# Patient Record
Sex: FEMALE | Race: WHITE | NOT HISPANIC OR LATINO | Employment: FULL TIME | URBAN - NONMETROPOLITAN AREA
[De-identification: names, ages, dates, MRNs, and addresses within clinical notes are randomized per-mention and may not be internally consistent; named-entity substitution may affect disease eponyms.]

---

## 2017-03-14 DIAGNOSIS — I10 ESSENTIAL HYPERTENSION: ICD-10-CM

## 2017-03-14 RX ORDER — METOPROLOL SUCCINATE 100 MG/1
100 TABLET, EXTENDED RELEASE ORAL DAILY
Qty: 90 TABLET | Refills: 2 | Status: SHIPPED
Start: 2017-03-14 | End: 2017-11-11

## 2017-03-21 ENCOUNTER — OFFICE VISIT (OUTPATIENT)
Dept: FAMILY MEDICINE | Facility: OTHER | Age: 47
End: 2017-03-21
Attending: FAMILY MEDICINE
Payer: COMMERCIAL

## 2017-03-21 VITALS
HEART RATE: 86 BPM | OXYGEN SATURATION: 98 % | SYSTOLIC BLOOD PRESSURE: 128 MMHG | BODY MASS INDEX: 39.68 KG/M2 | WEIGHT: 210 LBS | DIASTOLIC BLOOD PRESSURE: 80 MMHG | RESPIRATION RATE: 20 BRPM

## 2017-03-21 DIAGNOSIS — L03.211 CELLULITIS OF FACE: Primary | ICD-10-CM

## 2017-03-21 PROCEDURE — 99213 OFFICE O/P EST LOW 20 MIN: CPT | Performed by: FAMILY MEDICINE

## 2017-03-21 PROCEDURE — 99212 OFFICE O/P EST SF 10 MIN: CPT

## 2017-03-21 RX ORDER — CEFPROZIL 500 MG/1
500 TABLET, FILM COATED ORAL 2 TIMES DAILY
Qty: 20 TABLET | Refills: 0 | Status: SHIPPED
Start: 2017-03-21 | End: 2017-08-14

## 2017-03-21 ASSESSMENT — PAIN SCALES - GENERAL: PAINLEVEL: MODERATE PAIN (4)

## 2017-03-21 NOTE — NURSING NOTE
"Chief Complaint   Patient presents with     Nose Problem     4 days nose pain, more so on the left side of her nose. Pain has been constant throbbing, today pain has diminished. Did end up hitting her nose on the side of a door.        Initial /80  Pulse 86  Resp 20  Wt 210 lb (95.3 kg)  SpO2 98%  BMI 39.68 kg/m2 Estimated body mass index is 39.68 kg/(m^2) as calculated from the following:    Height as of 12/9/16: 5' 1\" (1.549 m).    Weight as of this encounter: 210 lb (95.3 kg).  Medication Reconciliation: complete   Kelly Jaeger      "

## 2017-03-21 NOTE — PROGRESS NOTES
SUBJECTIVE:  Edith Doty, 46 year old, female is seen with left nasal swelling, pain and drainage.  Present over the last several days.    Denies fever, shaking, chills, nausea, vomiting, or diarrhea.  No household contacts are ill.      Current Outpatient Prescriptions   Medication Sig Dispense Refill     metoprolol (TOPROL-XL) 100 MG 24 hr tablet Take 1 tablet (100 mg) by mouth daily 90 tablet 2     amLODIPine (NORVASC) 10 MG tablet Take 1 tablet (10 mg) by mouth daily 90 tablet 1     albuterol (2.5 MG/3ML) 0.083% nebulizer solution USE 1 VIAL BY NEBULIZATION EVERY 6 HOURS AS NEEDED FOR SHORTNESS OF BREATH / DYSPNEA OR WHEEZING 90 mL 3     fluticasone (FLONASE) 50 MCG/ACT nasal spray SPRAY 2 SPRAYS INTO BOTH NOSTRILS DAILY 16 g 11     albuterol (ALBUTEROL) 108 (90 BASE) MCG/ACT inhaler Inhale 2 puffs into the lungs every 6 hours 3 Inhaler 1     levothyroxine (SYNTHROID) 200 MCG tablet Take 200 mcg by mouth daily          Allergies   Allergen Reactions     Amoxicillin Trihydrate Itching     Augmentin       Clavulanic Acid Potassium Itching     Augmentin       Levaquin [Levofloxacin] Swelling     Penicillins Hives       Past Medical History   Diagnosis Date     Allergic Rhinitis, Seasonal 04/25/2011     Asthma 01/01/2011     Hypertension, Benign 04/25/2011     Hypothyroidism 01/01/2011     Nontoxic uninodular goiter 11/28/2007     Prediabetes 04/25/2011     Past Surgical History   Procedure Laterality Date     Cholecystectomy  1996     Eye surgery for strabismus at ages 2 & 4       Family History   Problem Relation Age of Onset     DIABETES Father      Other - See Comments Sister      endometriosis     Allergies Sister      environmental     Other - See Comments Mother      fibromyalgia and endometreosis     HEART DISEASE Mother      murmer     Allergies Mother      environmental     Hypertension Brother      Allergies Brother      environmental     Asthma No family hx of      Thyroid Disease No family hx of       Social History     Social History     Marital status:      Spouse name: N/A     Number of children: N/A     Years of education: N/A     Occupational History     [employer]      full-time     Social History Main Topics     Smoking status: Never Smoker     Smokeless tobacco: Never Used      Comment: no passive exposure     Alcohol use No     Drug use: No     Sexual activity: Yes     Partners: Male     Other Topics Concern      Service No     Blood Transfusions Yes     Permits if needed     Caffeine Concern No     Seat Belt Yes     Self-Exams Yes     Parent/Sibling W/ Cabg, Mi Or Angioplasty Before 65f 55m? No     Social History Narrative         Review Of Systems  Constitutional, HEENT, cardiovascular, pulmonary, gi and gu systems are negative, except as otherwise noted.    OBJECTIVE:  Vitals: B/P: 128/80, T: Data Unavailable, P: 86, R: 20    Exam:  Physical Exam   Constitutional: She is oriented to person, place, and time. She appears well-developed and well-nourished. No distress.   HENT:   Head: Normocephalic.   Right Ear: Hearing, tympanic membrane, external ear and ear canal normal.   Left Ear: Hearing, tympanic membrane, external ear and ear canal normal.   Nose: Nose normal. Right sinus exhibits no maxillary sinus tenderness and no frontal sinus tenderness. Left sinus exhibits no maxillary sinus tenderness and no frontal sinus tenderness.   Mouth/Throat: Uvula is midline and oropharynx is clear and moist. No oropharyngeal exudate or posterior oropharyngeal erythema.   There is edema and erythema noted left nasal ala   Eyes: Conjunctivae are normal.   Neck: Neck supple.   Pulmonary/Chest: Effort normal and breath sounds normal.   Lymphadenopathy:     She has no cervical adenopathy.   Neurological: She is alert and oriented to person, place, and time.   Skin: Skin is warm and dry.   Psychiatric: She has a normal mood and affect.     Other exam not repeated    Labs:  Office Visit on  09/26/2014   Component Date Value Ref Range Status     Specimen Description 09/26/2014 Throat   Final     Rapid Strep A Screen 09/26/2014    Final                    Value:NEGATIVE: No Group A streptococcal antigen detected by immunoassay, await   culture report.       Micro Report Status 09/26/2014 FINAL 09/26/2014   Final     Specimen Description 09/26/2014 Throat   Final     Culture Micro 09/26/2014 No beta hemolytic Streptococcus Group A isolated   Final     Micro Report Status 09/26/2014 FINAL 09/28/2014   Final       ASSESSMENT/PLAN:  Cellulitis, nose  Cefprozil as written.  Warm packs.  Follow up with persistent symptoms  - cefPROZIL (CEFZIL) 500 MG tablet; Take 1 tablet (500 mg) by mouth 2 times daily            Gonzalo Pino MD

## 2017-03-21 NOTE — MR AVS SNAPSHOT
"              After Visit Summary   3/21/2017    Edith Doty    MRN: [id number]           Patient Information     Date Of Birth          1970        Visit Information        Provider Department      3/21/2017 2:40 PM [facility]        Today's Diagnoses     Cellulitis, nose    -  1      Care Instructions    Take cefzil twice daily        Follow-ups after your visit        Follow-up notes from your care team     Return if symptoms worsen or fail to improve.      Who to contact     If you have questions or need follow up information about today's clinic visit or your schedule please contact [facility] directly at [phone].  Normal or non-critical lab and imaging results will be communicated to you by MyChart, letter or phone within 4 business days after the clinic has received the results. If you do not hear from us within 7 days, please contact the clinic through MyChart or phone. If you have a critical or abnormal lab result, we will notify you by phone as soon as possible.  Submit refill requests through [employer] or call your pharmacy and they will forward the refill request to us. Please allow 3 business days for your refill to be completed.          Additional Information About Your Visit        MyChart Information     [employer] lets you send messages to your doctor, view your test results, renew your prescriptions, schedule appointments and more. To sign up, go to [email or web address] . Click on \"Log in\" on the left side of the screen, which will take you to the Welcome page. Then click on \"Sign up Now\" on the right side of the page.     You will be asked to enter the access code listed below, as well as some personal information. Please follow the directions to create your username and password.     Your access code is: [email or web address]  Expires: 2017  7:36 PM     Your access code will  in 90 days. If you need help or a new code, please call " your [facility] clinic or [phone].        Care EveryWhere ID     This is your Care EveryWhere ID. This could be used by other organizations to access your [facility] medical records  [id number]        Your Vitals Were     Pulse Respirations Pulse Oximetry BMI (Body Mass Index)          86 20 98% 39.68 kg/m2         Blood Pressure from Last 3 Encounters:   03/21/17 128/80   12/09/16 136/85   11/16/16 (!) 134/92    Weight from Last 3 Encounters:   03/21/17 210 lb (95.3 kg)   12/09/16 218 lb (98.9 kg)   11/16/16 217 lb (98.4 kg)              Today, you had the following     No orders found for display         Today's Medication Changes          These changes are accurate as of: 3/21/17 11:59 PM.  If you have any questions, ask your nurse or doctor.               Start taking these medicines.        Dose/Directions    cefPROZIL 500 MG tablet   Commonly known as:  CEFZIL   Used for:  Cellulitis of face   Started by:  Gonzalo Pino MD        Dose:  500 mg   Take 1 tablet (500 mg) by mouth 2 times daily   Quantity:  20 tablet   Refills:  0            Where to get your medicines      These medications were sent to [address] [address]     Phone:  [phone]     cefPROZIL 500 MG tablet                Primary Care Provider Office Phone # Fax #    Gonzalo Pino [phone]       [address] [address]        Thank you!     Thank you for choosing [facility]  for your care. Our goal is always to provide you with excellent care. Hearing back from our patients is one way we can continue to improve our services. Please take a few minutes to complete the written survey that you may receive in the mail after your visit with us. Thank you!             Your Updated Medication List - Protect others around you: Learn how to safely use, store and throw away your medicines at www.disposemymeds.org.          This  list is accurate as of: 3/21/17 11:59 PM.  Always use your most recent med list.                   Brand Name Dispense Instructions for use    * albuterol 108 (90 BASE) MCG/ACT Inhaler    albuterol    3 Inhaler    Inhale 2 puffs into the lungs every 6 hours       * albuterol (2.5 MG/3ML) 0.083% neb solution     90 mL    USE 1 VIAL BY NEBULIZATION EVERY 6 HOURS AS NEEDED FOR SHORTNESS OF BREATH / DYSPNEA OR WHEEZING       amLODIPine 10 MG tablet    NORVASC    90 tablet    Take 1 tablet (10 mg) by mouth daily       cefPROZIL 500 MG tablet    CEFZIL    20 tablet    Take 1 tablet (500 mg) by mouth 2 times daily       fluticasone 50 MCG/ACT spray    FLONASE    16 g    SPRAY 2 SPRAYS INTO BOTH NOSTRILS DAILY       metoprolol 100 MG 24 hr tablet    TOPROL-XL    90 tablet    Take 1 tablet (100 mg) by mouth daily       SYNTHROID 200 MCG tablet   Generic drug:  levothyroxine      Take 200 mcg by mouth daily       * Notice:  This list has 2 medication(s) that are the same as other medications prescribed for you. Read the directions carefully, and ask your doctor or other care provider to review them with you.

## 2017-07-03 DIAGNOSIS — I10 ESSENTIAL HYPERTENSION: ICD-10-CM

## 2017-07-05 RX ORDER — AMLODIPINE BESYLATE 10 MG/1
TABLET ORAL
Qty: 90 TABLET | Refills: 2 | Status: SHIPPED | OUTPATIENT
Start: 2017-07-05 | End: 2018-03-05

## 2017-08-04 ENCOUNTER — TRANSFERRED RECORDS (OUTPATIENT)
Dept: HEALTH INFORMATION MANAGEMENT | Facility: HOSPITAL | Age: 47
End: 2017-08-04

## 2017-08-14 ENCOUNTER — OFFICE VISIT (OUTPATIENT)
Dept: FAMILY MEDICINE | Facility: OTHER | Age: 47
End: 2017-08-14
Attending: FAMILY MEDICINE
Payer: COMMERCIAL

## 2017-08-14 VITALS
HEART RATE: 78 BPM | OXYGEN SATURATION: 96 % | BODY MASS INDEX: 39.46 KG/M2 | SYSTOLIC BLOOD PRESSURE: 128 MMHG | WEIGHT: 209 LBS | DIASTOLIC BLOOD PRESSURE: 90 MMHG | TEMPERATURE: 97.3 F | HEIGHT: 61 IN

## 2017-08-14 DIAGNOSIS — I10 ESSENTIAL HYPERTENSION: Primary | ICD-10-CM

## 2017-08-14 PROCEDURE — 99213 OFFICE O/P EST LOW 20 MIN: CPT | Performed by: FAMILY MEDICINE

## 2017-08-14 PROCEDURE — 99212 OFFICE O/P EST SF 10 MIN: CPT

## 2017-08-14 ASSESSMENT — PATIENT HEALTH QUESTIONNAIRE - PHQ9
5. POOR APPETITE OR OVEREATING: NOT AT ALL
SUM OF ALL RESPONSES TO PHQ QUESTIONS 1-9: 0

## 2017-08-14 ASSESSMENT — ANXIETY QUESTIONNAIRES
3. WORRYING TOO MUCH ABOUT DIFFERENT THINGS: NOT AT ALL
5. BEING SO RESTLESS THAT IT IS HARD TO SIT STILL: NOT AT ALL
1. FEELING NERVOUS, ANXIOUS, OR ON EDGE: SEVERAL DAYS
7. FEELING AFRAID AS IF SOMETHING AWFUL MIGHT HAPPEN: NOT AT ALL
GAD7 TOTAL SCORE: 1
6. BECOMING EASILY ANNOYED OR IRRITABLE: NOT AT ALL
2. NOT BEING ABLE TO STOP OR CONTROL WORRYING: NOT AT ALL

## 2017-08-14 ASSESSMENT — PAIN SCALES - GENERAL: PAINLEVEL: NO PAIN (0)

## 2017-08-14 NOTE — PATIENT INSTRUCTIONS
My Asthma Action Plan  Name: Edith Doty   YOB: 1970  Date: 8/14/2017   My doctor: Gonzalo Pino MD   My clinic: [facility] [place]        My Control Medicine: none  My Rescue Medicine: Albuterol (Proair/Ventolin/Proventil) inhaler inhale 2 puffs into the lungs every 6 hours  My Oral Steroid Medicine: prednisone  My Asthma Severity: intermittent  Avoid your asthma triggers: smoke, pollens, humidity and strong odors and fumes               GREEN ZONE   Good Control    I feel good    No cough or wheeze    Can work, sleep and play without asthma symptoms       Take your asthma control medicine every day.     1. If exercise triggers your asthma, take your rescue medication    15 minutes before exercise or sports, and    During exercise if you have asthma symptoms  2. Spacer to use with inhaler: If you have a spacer, make sure to use it with your inhaler             YELLOW ZONE Getting Worse  I have ANY of these:    I do not feel good    Cough or wheeze    Chest feels tight    Wake up at night   1. Keep taking your Green Zone medications  2. Start taking your rescue medicine:    every 20 minutes for up to 1 hour. Then every 4 hours for 24-48 hours.  3. If you stay in the Yellow Zone for more than 12-24 hours, contact your doctor.  4. If you do not return to the Green Zone in 12-24 hours or you get worse, start taking your oral steroid medicine if prescribed by your provider.           RED ZONE Medical Alert - Get Help  I have ANY of these:    I feel awful    Medicine is not helping    Breathing getting harder    Trouble walking or talking    Nose opens wide to breathe       1. Take your rescue medicine NOW  2. If your provider has prescribed an oral steroid medicine, start taking it NOW  3. Call your doctor NOW  4. If you are still in the Red Zone after 20 minutes and you have not reached your doctor:    Take your rescue medicine again and    Call 911 or go to the emergency room right  away    See your regular doctor within 2 weeks of an Emergency Room or Urgent Care visit for follow-up treatment.        Electronically signed by: Lilian Pool, August 14, 2017    Annual Reminders:  Meet with Asthma Educator,  Flu Shot in the Fall, consider Pneumonia Vaccination for patients with asthma (aged 19 and older).    Pharmacy:    [id number] [place], [place] - [address]   [facility] - [place], [place] - [address]                    Asthma Triggers  How To Control Things That Make Your Asthma Worse    Triggers are things that make your asthma worse.  Look at the list below to help you find your triggers and what you can do about them.  You can help prevent asthma flare-ups by staying away from your triggers.      Trigger                                                          What you can do   Cigarette Smoke  Tobacco smoke can make asthma worse. Do not allow smoking in your home, car or around you.  Be sure no one smokes at a child s day care or school.  If you smoke, ask your health care provider for ways to help you quit.  Ask family members to quit too.  Ask your health care provider for a referral to Quit Plan to help you quit smoking, or call 8-142-453-PLAN.     Colds, Flu, Bronchitis  These are common triggers of asthma. Wash your hands often.  Don t touch your eyes, nose or mouth.  Get a flu shot every year.     Dust Mites  These are tiny bugs that live in cloth or carpet. They are too small to see. Wash sheets and blankets in hot water every week.   Encase pillows and mattress in dust mite proof covers.  Avoid having carpet if you can. If you have carpet, vacuum weekly.   Use a dust mask and HEPA vacuum.   Pollen and Outdoor Mold  Some people are allergic to trees, grass, or weed pollen, or molds. Try to keep your windows closed.  Limit time out doors when pollen count is high.   Ask you health care provider about taking  medicine during allergy season.     Animal Dander  Some people are allergic to skin flakes, urine or saliva from pets with fur or feathers. Keep pets with fur or feathers out of your home.    If you can t keep the pet outdoors, then keep the pet out of your bedroom.  Keep the bedroom door closed.  Keep pets off cloth furniture and away from stuffed toys.     Mice, Rats, and Cockroaches  Some people are allergic to the waste from these pests.   Cover food and garbage.  Clean up spills and food crumbs.  Store grease in the refrigerator.   Keep food out of the bedroom.   Indoor Mold  This can be a trigger if your home has high moisture. Fix leaking faucets, pipes, or other sources of water.   Clean moldy surfaces.  Dehumidify basement if it is damp and smelly.   Smoke, Strong Odors, and Sprays  These can reduce air quality. Stay away from strong odors and sprays, such as perfume, powder, hair spray, paints, smoke incense, paint, cleaning products, candles and new carpet.   Exercise or Sports  Some people with asthma have this trigger. Be active!  Ask your doctor about taking medicine before sports or exercise to prevent symptoms.    Warm up for 5-10 minutes before and after sports or exercise.     Other Triggers of Asthma  Cold air:  Cover your nose and mouth with a scarf.  Sometimes laughing or crying can be a trigger.  Some medicines and food can trigger asthma.

## 2017-08-14 NOTE — PROGRESS NOTES
"    SUBJECTIVE:                                                    Edith Doty is a 46 year old female who presents to clinic today for the following health issues:  {Provider please address medication reconciliation discrepancies--rooming staff please delete if no med/rec issues}    {Superlists:706611}    {additional problems for provider to add:993093}    Problem list and histories reviewed & adjusted, as indicated.  Additional history: {NONE - AS DOCUMENTED:964963::\"as documented\"}    {HIST REVIEW/ LINKS 2:693186}    {PROVIDER CHARTING PREFERENCE:823378}      "

## 2017-08-14 NOTE — NURSING NOTE
"Chief Complaint   Patient presents with     Hypertension     checking blood pressures at home- no salt added, not excersing daily, regular diet        Initial /90 (BP Location: Right arm, Patient Position: Supine, Cuff Size: Adult Large)  Pulse 78  Temp 97.3  F (36.3  C) (Tympanic)  Ht 5' 1\" (1.549 m)  Wt 209 lb (94.8 kg)  SpO2 96%  BMI 39.49 kg/m2 Estimated body mass index is 39.49 kg/(m^2) as calculated from the following:    Height as of this encounter: 5' 1\" (1.549 m).    Weight as of this encounter: 209 lb (94.8 kg).  Medication Reconciliation: complete   Lilian Pool LPN      "

## 2017-08-14 NOTE — MR AVS SNAPSHOT
After Visit Summary   8/14/2017    Edith Doty    MRN: [id number]           Patient Information     Date Of Birth          1970        Visit Information        Provider Department      8/14/2017 10:20 AM Gonzalo Pino MD [facility]        Today's Diagnoses     Essential hypertension    -  1      Care Instructions      My Asthma Action Plan  Name: Edith Doty   YOB: 1970  Date: 8/14/2017   My doctor: Gonzalo Pino MD   My clinic: [facility] [place]        My Control Medicine: none  My Rescue Medicine: Albuterol (Proair/Ventolin/Proventil) inhaler inhale 2 puffs into the lungs every 6 hours  My Oral Steroid Medicine: prednisone  My Asthma Severity: intermittent  Avoid your asthma triggers: smoke, pollens, humidity and strong odors and fumes               GREEN ZONE   Good Control    I feel good    No cough or wheeze    Can work, sleep and play without asthma symptoms       Take your asthma control medicine every day.     1. If exercise triggers your asthma, take your rescue medication    15 minutes before exercise or sports, and    During exercise if you have asthma symptoms  2. Spacer to use with inhaler: If you have a spacer, make sure to use it with your inhaler             YELLOW ZONE Getting Worse  I have ANY of these:    I do not feel good    Cough or wheeze    Chest feels tight    Wake up at night   1. Keep taking your Green Zone medications  2. Start taking your rescue medicine:    every 20 minutes for up to 1 hour. Then every 4 hours for 24-48 hours.  3. If you stay in the Yellow Zone for more than 12-24 hours, contact your doctor.  4. If you do not return to the Green Zone in 12-24 hours or you get worse, start taking your oral steroid medicine if prescribed by your provider.           RED ZONE Medical Alert - Get Help  I have ANY of these:    I feel awful    Medicine is not helping    Breathing getting harder    Trouble walking or  talking    Nose opens wide to breathe       1. Take your rescue medicine NOW  2. If your provider has prescribed an oral steroid medicine, start taking it NOW  3. Call your doctor NOW  4. If you are still in the Red Zone after 20 minutes and you have not reached your doctor:    Take your rescue medicine again and    Call 911 or go to the emergency room right away    See your regular doctor within 2 weeks of an Emergency Room or Urgent Care visit for follow-up treatment.        Electronically signed by: Lilian Pool, August 14, 2017    Annual Reminders:  Meet with Asthma Educator,  Flu Shot in the Fall, consider Pneumonia Vaccination for patients with asthma (aged 19 and older).    Pharmacy:    [facility] - [place], [place] - [address]   [facility] - [place], [place] - [address]                    Asthma Triggers  How To Control Things That Make Your Asthma Worse    Triggers are things that make your asthma worse.  Look at the list below to help you find your triggers and what you can do about them.  You can help prevent asthma flare-ups by staying away from your triggers.      Trigger                                                          What you can do   Cigarette Smoke  Tobacco smoke can make asthma worse. Do not allow smoking in your home, car or around you.  Be sure no one smokes at a child s day care or school.  If you smoke, ask your health care provider for ways to help you quit.  Ask family members to quit too.  Ask your health care provider for a referral to Quit Plan to help you quit smoking, or call 5-970-240-PLAN.     Colds, Flu, Bronchitis  These are common triggers of asthma. Wash your hands often.  Don t touch your eyes, nose or mouth.  Get a flu shot every year.     Dust Mites  These are tiny bugs that live in cloth or carpet. They are too small to see. Wash sheets and blankets in hot water every week.   Encase pillows and  mattress in dust mite proof covers.  Avoid having carpet if you can. If you have carpet, vacuum weekly.   Use a dust mask and HEPA vacuum.   Pollen and Outdoor Mold  Some people are allergic to trees, grass, or weed pollen, or molds. Try to keep your windows closed.  Limit time out doors when pollen count is high.   Ask you health care provider about taking medicine during allergy season.     Animal Dander  Some people are allergic to skin flakes, urine or saliva from pets with fur or feathers. Keep pets with fur or feathers out of your home.    If you can t keep the pet outdoors, then keep the pet out of your bedroom.  Keep the bedroom door closed.  Keep pets off cloth furniture and away from stuffed toys.     Mice, Rats, and Cockroaches  Some people are allergic to the waste from these pests.   Cover food and garbage.  Clean up spills and food crumbs.  Store grease in the refrigerator.   Keep food out of the bedroom.   Indoor Mold  This can be a trigger if your home has high moisture. Fix leaking faucets, pipes, or other sources of water.   Clean moldy surfaces.  Dehumidify basement if it is damp and smelly.   Smoke, Strong Odors, and Sprays  These can reduce air quality. Stay away from strong odors and sprays, such as perfume, powder, hair spray, paints, smoke incense, paint, cleaning products, candles and new carpet.   Exercise or Sports  Some people with asthma have this trigger. Be active!  Ask your doctor about taking medicine before sports or exercise to prevent symptoms.    Warm up for 5-10 minutes before and after sports or exercise.     Other Triggers of Asthma  Cold air:  Cover your nose and mouth with a scarf.  Sometimes laughing or crying can be a trigger.  Some medicines and food can trigger asthma.             Follow-ups after your visit        Follow-up notes from your care team     Return in about 6 months (around 2/14/2018) for Follow Up Visit.      Who to contact     If you have questions or need  "follow up information about today's clinic visit or your schedule please contact [facility] directly at [phone].  Normal or non-critical lab and imaging results will be communicated to you by MyChart, letter or phone within 4 business days after the clinic has received the results. If you do not hear from us within 7 days, please contact the clinic through RadioSc[employer] or phone. If you have a critical or abnormal lab result, we will notify you by phone as soon as possible.  Submit refill requests through Travel Notes or call your pharmacy and they will forward the refill request to us. Please allow 3 business days for your refill to be completed.          Additional Information About Your Visit        RadioScapehart Information     Travel Notes lets you send messages to your doctor, view your test results, renew your prescriptions, schedule appointments and more. To sign up, go to [email or web address] . Click on \"Log in\" on the left side of the screen, which will take you to the Welcome page. Then click on \"Sign up Now\" on the right side of the page.     You will be asked to enter the access code listed below, as well as some personal information. Please follow the directions to create your username and password.     Your access code is: [email or web address]  Expires: 2017  8:49 AM     Your access code will  in 90 days. If you need help or a new code, please call your [place] clinic or [phone].        Care EveryWhere ID     This is your Care EveryWhere ID. This could be used by other organizations to access your [place] medical records  [id number]        Your Vitals Were     Pulse Temperature Height Pulse Oximetry BMI (Body Mass Index)       78 97.3  F (36.3  C) (Tympanic) 5' 1\" (1.549 m) 96% 39.49 kg/m2        Blood Pressure from Last 3 Encounters:   17 128/90   17 128/80   16 136/85    Weight from Last 3 Encounters:   17 209 lb (94.8 kg)   17 210 lb (95.3 kg)   16 " 218 lb (98.9 kg)               Primary Care Provider Office Phone # Fax #    Gonzalo Pino [phone]       [facility] [facility] [address]        Equal Access to Services     SARINA PEREYRA : Hadii christiano ku hadanupamao Soomaali, waaxda luqadaha, qaybta kaalmada adeegyada, tegan lorin elizabethn lluviapalak hotl mery emery. So [place] [phone].    ATENCIÓN: Si habla español, tiene a de la cruz disposición servicios gratuitos de asistencia lingüística. Llame al [phone].    We comply with applicable federal civil rights laws and [place] laws. We do not discriminate on the basis of race, color, national origin, age, disability sex, sexual orientation or gender identity.            Thank you!     Thank you for choosing [facility]  for your care. Our goal is always to provide you with excellent care. Hearing back from our patients is one way we can continue to improve our services. Please take a few minutes to complete the written survey that you may receive in the mail after your visit with us. Thank you!             Your Updated Medication List - Protect others around you: Learn how to safely use, store and throw away your medicines at www.disposemymeds.org.          This list is accurate as of: 8/14/17 11:59 PM.  Always use your most recent med list.                   Brand Name Dispense Instructions for use Diagnosis    * albuterol 108 (90 BASE) MCG/ACT Inhaler    albuterol    3 Inhaler    Inhale 2 puffs into the lungs every 6 hours    Asthma       * albuterol (2.5 MG/3ML) 0.083% neb solution     90 mL    USE 1 VIAL BY NEBULIZATION EVERY 6 HOURS AS NEEDED FOR SHORTNESS OF BREATH / DYSPNEA OR WHEEZING    Asthma exacerbation       amLODIPine 10 MG tablet    NORVASC    90 tablet    TAKE 1 TABLET BY MOUTH EVERY DAY    Essential hypertension       fluticasone 50 MCG/ACT spray    FLONASE    16 g    SPRAY 2 SPRAYS INTO BOTH NOSTRILS DAILY    Allergic rhinitis due to pollen        metoprolol 100 MG 24 hr tablet    TOPROL-XL    90 tablet    Take 1 tablet (100 mg) by mouth daily    Essential hypertension       SYNTHROID 200 MCG tablet   Generic drug:  levothyroxine      Take 200 mcg by mouth daily        * Notice:  This list has 2 medication(s) that are the same as other medications prescribed for you. Read the directions carefully, and ask your doctor or other care provider to review them with you.

## 2017-08-14 NOTE — PROGRESS NOTES
SUBJECTIVE:  Edith Doty, 46 year old, female is seen in follow up of HTN.   Her BP is controlled with the current regimen without complications or sequelae.  She was, however, note to have an isolated elevation at another provider. Edith was advised to follow up.    Denies chest pain, dyspnea, decreased exercise tolerance, dizzyness, nausea, vomiting, diaphoresis, palpitations, numbness, tingling, or paresthesias.      Current Outpatient Prescriptions   Medication Sig Dispense Refill     amLODIPine (NORVASC) 10 MG tablet TAKE 1 TABLET BY MOUTH EVERY DAY 90 tablet 2     metoprolol (TOPROL-XL) 100 MG 24 hr tablet Take 1 tablet (100 mg) by mouth daily 90 tablet 2     albuterol (2.5 MG/3ML) 0.083% nebulizer solution USE 1 VIAL BY NEBULIZATION EVERY 6 HOURS AS NEEDED FOR SHORTNESS OF BREATH / DYSPNEA OR WHEEZING 90 mL 3     fluticasone (FLONASE) 50 MCG/ACT nasal spray SPRAY 2 SPRAYS INTO BOTH NOSTRILS DAILY 16 g 11     albuterol (ALBUTEROL) 108 (90 BASE) MCG/ACT inhaler Inhale 2 puffs into the lungs every 6 hours 3 Inhaler 1     levothyroxine (SYNTHROID) 200 MCG tablet Take 200 mcg by mouth daily          Allergies   Allergen Reactions     Amoxicillin Trihydrate Itching     Augmentin       Clavulanic Acid Potassium Itching     Augmentin       Levaquin [Levofloxacin] Swelling     Penicillins Hives       Past Medical History:   Diagnosis Date     Allergic Rhinitis, Seasonal 04/25/2011     Asthma 01/01/2011     Hypertension, Benign 04/25/2011     Hypothyroidism 01/01/2011     Nontoxic uninodular goiter 11/28/2007     Prediabetes 04/25/2011     Past Surgical History:   Procedure Laterality Date     CHOLECYSTECTOMY  1996     eye surgery for strabismus at ages 2 & 4       Family History   Problem Relation Age of Onset     DIABETES Father      Other - See Comments Sister      endometriosis     Allergies Sister      environmental     Other - See Comments Mother      fibromyalgia and endometreosis     HEART DISEASE Mother       murmer     Allergies Mother      environmental     Hypertension Brother      Allergies Brother      environmental     Asthma No family hx of      Thyroid Disease No family hx of      Social History     Social History     Marital status:      Spouse name: N/A     Number of children: N/A     Years of education: N/A     Occupational History     [employer] tuter      full-time     Social History Main Topics     Smoking status: Never Smoker     Smokeless tobacco: Never Used      Comment: no passive exposure     Alcohol use 0.0 oz/week     0 Standard drinks or equivalent per week      Comment: socially      Drug use: No     Sexual activity: Yes     Partners: Male     Other Topics Concern      Service No     Blood Transfusions Yes     Permits if needed     Caffeine Concern No     Seat Belt Yes     Self-Exams Yes     Parent/Sibling W/ Cabg, Mi Or Angioplasty Before 65f 55m? No     Social History Narrative         Review Of Systems  Constitutional, HEENT, cardiovascular, pulmonary, gi and gu systems are negative, except as otherwise noted.    OBJECTIVE:  Vitals: B/P: 132/88, T: 97.5, P: 80, R: 16    Exam:  Physical Exam   Constitutional: She is oriented to person, place, and time. She appears well-developed and well-nourished. No distress.   Neurological: She is alert and oriented to person, place, and time.   Psychiatric: She has a normal mood and affect.   Other exam not repeated      Labs:  Office Visit on 09/26/2014   Component Date Value Ref Range Status     Specimen Description 09/26/2014 Throat   Final     Rapid Strep A Screen 09/26/2014    Final                    Value:NEGATIVE: No Group A streptococcal antigen detected by immunoassay, await   culture report.       Micro Report Status 09/26/2014 FINAL 09/26/2014   Final     Specimen Description 09/26/2014 Throat   Final     Culture Micro 09/26/2014 No beta hemolytic Streptococcus Group A isolated   Final     Micro Report Status 09/26/2014 FINAL  09/28/2014   Final       ASSESSMENT/PLAN:  Essential hypertension  Reviewed.  Continue same medications as outlined.  Fasting labs to be drawn.  Follow up 6 months.  - Asthma Action Plan; Future  - Lipid Profile; Future  - Comprehensive metabolic panel; Future  - CBC with platelets differential; Future  - UA reflex to Microscopic and Culture; Future                  Gonzalo Pino MD

## 2017-08-15 DIAGNOSIS — I10 ESSENTIAL HYPERTENSION: ICD-10-CM

## 2017-08-15 LAB
ALBUMIN SERPL-MCNC: 3.4 G/DL (ref 3.4–5)
ALBUMIN UR-MCNC: ABNORMAL MG/DL
ALP SERPL-CCNC: 62 U/L (ref 40–150)
ALT SERPL W P-5'-P-CCNC: 39 U/L (ref 0–50)
ANION GAP SERPL CALCULATED.3IONS-SCNC: 9 MMOL/L (ref 3–14)
APPEARANCE UR: CLEAR
AST SERPL W P-5'-P-CCNC: 33 U/L (ref 0–45)
BASOPHILS # BLD AUTO: 0.1 10E9/L (ref 0–0.2)
BASOPHILS NFR BLD AUTO: 0.7 %
BILIRUB SERPL-MCNC: 0.8 MG/DL (ref 0.2–1.3)
BILIRUB UR QL STRIP: NEGATIVE
BUN SERPL-MCNC: 9 MG/DL (ref 7–30)
CALCIUM SERPL-MCNC: 8.8 MG/DL (ref 8.5–10.1)
CHLORIDE SERPL-SCNC: 102 MMOL/L (ref 94–109)
CHOLEST SERPL-MCNC: 174 MG/DL
CO2 SERPL-SCNC: 23 MMOL/L (ref 20–32)
COLOR UR AUTO: YELLOW
CREAT SERPL-MCNC: 0.68 MG/DL (ref 0.52–1.04)
DIFFERENTIAL METHOD BLD: NORMAL
EOSINOPHIL # BLD AUTO: 0.2 10E9/L (ref 0–0.7)
EOSINOPHIL NFR BLD AUTO: 3.2 %
ERYTHROCYTE [DISTWIDTH] IN BLOOD BY AUTOMATED COUNT: 12.3 % (ref 10–15)
GFR SERPL CREATININE-BSD FRML MDRD: >90 ML/MIN/1.7M2
GLUCOSE SERPL-MCNC: 212 MG/DL (ref 70–99)
GLUCOSE UR STRIP-MCNC: 100 MG/DL
HCT VFR BLD AUTO: 42.9 % (ref 35–47)
HDLC SERPL-MCNC: 52 MG/DL
HGB BLD-MCNC: 14.3 G/DL (ref 11.7–15.7)
HGB UR QL STRIP: NEGATIVE
KETONES UR STRIP-MCNC: NEGATIVE MG/DL
LDLC SERPL CALC-MCNC: 63 MG/DL
LEUKOCYTE ESTERASE UR QL STRIP: NEGATIVE
LYMPHOCYTES # BLD AUTO: 2.4 10E9/L (ref 0.8–5.3)
LYMPHOCYTES NFR BLD AUTO: 33 %
MCH RBC QN AUTO: 30.9 PG (ref 26.5–33)
MCHC RBC AUTO-ENTMCNC: 33.3 G/DL (ref 31.5–36.5)
MCV RBC AUTO: 93 FL (ref 78–100)
MONOCYTES # BLD AUTO: 0.6 10E9/L (ref 0–1.3)
MONOCYTES NFR BLD AUTO: 8.6 %
NEUTROPHILS # BLD AUTO: 4 10E9/L (ref 1.6–8.3)
NEUTROPHILS NFR BLD AUTO: 54.5 %
NITRATE UR QL: NEGATIVE
NONHDLC SERPL-MCNC: 122 MG/DL
PH UR STRIP: 6 PH (ref 5–7)
PLATELET # BLD AUTO: 297 10E9/L (ref 150–450)
POTASSIUM SERPL-SCNC: 4.1 MMOL/L (ref 3.4–5.3)
PROT SERPL-MCNC: 7 G/DL (ref 6.8–8.8)
RBC # BLD AUTO: 4.63 10E12/L (ref 3.8–5.2)
RBC #/AREA URNS AUTO: NORMAL /HPF (ref 0–2)
SODIUM SERPL-SCNC: 134 MMOL/L (ref 133–144)
SP GR UR STRIP: 1.02 (ref 1–1.03)
TRIGL SERPL-MCNC: 294 MG/DL
URN SPEC COLLECT METH UR: ABNORMAL
UROBILINOGEN UR STRIP-ACNC: 0.2 EU/DL (ref 0.2–1)
WBC # BLD AUTO: 7.3 10E9/L (ref 4–11)
WBC #/AREA URNS AUTO: NORMAL /HPF (ref 0–2)

## 2017-08-15 PROCEDURE — 36415 COLL VENOUS BLD VENIPUNCTURE: CPT | Mod: ZL | Performed by: FAMILY MEDICINE

## 2017-08-15 PROCEDURE — 80053 COMPREHEN METABOLIC PANEL: CPT | Mod: ZL | Performed by: FAMILY MEDICINE

## 2017-08-15 PROCEDURE — 80061 LIPID PANEL: CPT | Mod: ZL | Performed by: FAMILY MEDICINE

## 2017-08-15 PROCEDURE — 85025 COMPLETE CBC W/AUTO DIFF WBC: CPT | Mod: ZL | Performed by: FAMILY MEDICINE

## 2017-08-15 PROCEDURE — 81001 URINALYSIS AUTO W/SCOPE: CPT | Mod: ZL | Performed by: FAMILY MEDICINE

## 2017-08-15 ASSESSMENT — ASTHMA QUESTIONNAIRES: ACT_TOTALSCORE: 25

## 2017-08-15 ASSESSMENT — ANXIETY QUESTIONNAIRES: GAD7 TOTAL SCORE: 1

## 2017-08-17 DIAGNOSIS — J30.1 ALLERGIC RHINITIS DUE TO POLLEN: ICD-10-CM

## 2017-08-17 RX ORDER — FLUTICASONE PROPIONATE 50 MCG
SPRAY, SUSPENSION (ML) NASAL
Qty: 16 G | Refills: 3 | Status: SHIPPED | OUTPATIENT
Start: 2017-08-17 | End: 2017-12-11

## 2017-08-17 NOTE — TELEPHONE ENCOUNTER
fluticasone (FLONASE) 50 MCG/ACT spray  Last Written Prescription Date: 7/21/16  Last Fill Quantity: 16g,  # refills: 11   Last Office Visit with [facility], [facility] or [facility] prescribing provider: 8/14/17                                         Next 5 appointments (look out 90 days)     Aug 28, 2017 10:00 AM CDT   (Arrive by 9:45 AM)   SHORT with Gonzalo Pino MD   [facility] ([facility] )    [facility] [place] [address]   [phone]

## 2017-08-28 ENCOUNTER — OFFICE VISIT (OUTPATIENT)
Dept: FAMILY MEDICINE | Facility: OTHER | Age: 47
End: 2017-08-28
Attending: FAMILY MEDICINE
Payer: COMMERCIAL

## 2017-08-28 VITALS
HEIGHT: 61 IN | BODY MASS INDEX: 39.46 KG/M2 | SYSTOLIC BLOOD PRESSURE: 128 MMHG | DIASTOLIC BLOOD PRESSURE: 84 MMHG | OXYGEN SATURATION: 99 % | RESPIRATION RATE: 19 BRPM | HEART RATE: 76 BPM | WEIGHT: 209 LBS

## 2017-08-28 DIAGNOSIS — E11.9 TYPE 2 DIABETES MELLITUS WITHOUT COMPLICATION, WITHOUT LONG-TERM CURRENT USE OF INSULIN (H): Primary | ICD-10-CM

## 2017-08-28 LAB
EST. AVERAGE GLUCOSE BLD GHB EST-MCNC: 206 MG/DL
GLUCOSE SERPL-MCNC: 216 MG/DL (ref 70–99)
HBA1C MFR BLD: 8.8 % (ref 4.3–6)

## 2017-08-28 PROCEDURE — 40000788 ZZHCL STATISTIC ESTIMATED AVERAGE GLUCOSE: Mod: ZL | Performed by: FAMILY MEDICINE

## 2017-08-28 PROCEDURE — 36415 COLL VENOUS BLD VENIPUNCTURE: CPT | Mod: ZL | Performed by: FAMILY MEDICINE

## 2017-08-28 PROCEDURE — 99214 OFFICE O/P EST MOD 30 MIN: CPT | Performed by: FAMILY MEDICINE

## 2017-08-28 PROCEDURE — 83036 HEMOGLOBIN GLYCOSYLATED A1C: CPT | Mod: ZL | Performed by: FAMILY MEDICINE

## 2017-08-28 PROCEDURE — 99212 OFFICE O/P EST SF 10 MIN: CPT

## 2017-08-28 PROCEDURE — 82947 ASSAY GLUCOSE BLOOD QUANT: CPT | Mod: ZL | Performed by: FAMILY MEDICINE

## 2017-08-28 ASSESSMENT — PAIN SCALES - GENERAL: PAINLEVEL: NO PAIN (0)

## 2017-08-28 NOTE — MR AVS SNAPSHOT
After Visit Summary   8/28/2017    Edith Doty    MRN: [id number]           Patient Information     Date Of Birth          1970        Visit Information        Provider Department      8/28/2017 10:00 AM Gonzalo Pino MD [facility]        Today's Diagnoses     Type 2 diabetes mellitus without complication, without long-term current use of insulin (H)    -  1      Care Instructions    Diabetes Resource Center will call to schedule          Follow-ups after your visit        Additional Services     DIABETES EDUCATION REFERRAL ([place])       DIABETES SELF-MANAGEMENT TRAINING (DSMT)  Type of training and number of hours requested:  Initial Group DMST; 10    (Medicare will cover:10 hours initial DSMT in 12-month period, plus 2 hours follow-up DSMT annually)    Please add if the patient has special educational need: None  (Patients with special needs requiring individual DSMT)    Please include the following DMST content: All ten content areas, as appropriate    Patient has the following:Hypertension and Obesity    Please begin Medical Nutritional Therapy.  Initial Medical Nutritional Therapy (MNT)  ([employer]D allows 3 hours initial MNT in the first calendar year, plus two hours follow up MNT annually.  Additional MNT hours are available for change in medical condition treatment and/or diagnosis)    Additional Services Provided:  >>A1c will be completed upon referral and completion of program unless completed in clinic.  >>Influenza vaccination assessment (form #N228) as applicable.  >>Order for diabetes supplies will be faxed to patient's pharmacy.  >>If on insulin: Insulin dose adjustment per staged Diabetes Mgmt. Protocols    DIABETES RESOURCE CENTER  [facility]  Telephone:  [phone]   Fax:  [phone]                  Follow-up notes from your care team     Return in about 3 months (around 11/28/2017) for Follow Up Visit, Lab testing.      Your next  "10 appointments already scheduled     Sep 12, 2017  2:30 PM CDT   (Arrive by 2:15 PM)   Diabetic Education with Laura Mendoza RN   [facility] ([facility] )    [address] [place] [id number] [phone]              Who to contact     If you have questions or need follow up information about today's clinic visit or your schedule please contact [facility] directly at [phone].  Normal or non-critical lab and imaging results will be communicated to you by Yeswarehart, letter or phone within 4 business days after the clinic has received the results. If you do not hear from us within 7 days, please contact the clinic through Yeswarehart or phone. If you have a critical or abnormal lab result, we will notify you by phone as soon as possible.  Submit refill requests through [employer] or call your pharmacy and they will forward the refill request to us. Please allow 3 business days for your refill to be completed.          Additional Information About Your Visit        [employer] Information     [employer] lets you send messages to your doctor, view your test results, renew your prescriptions, schedule appointments and more. To sign up, go to [email or web address] . Click on \"Log in\" on the left side of the screen, which will take you to the Welcome page. Then click on \"Sign up Now\" on the right side of the page.     You will be asked to enter the access code listed below, as well as some personal information. Please follow the directions to create your username and password.     Your access code is: [email or web address]  Expires: 2017  8:49 AM     Your access code will  in 90 days. If you need help or a new code, please call your [place] clinic or [phone].        Care EveryWhere ID     This is your Care EveryWhere ID. This could be used by other organizations to access your [place] medical records  [id number]        Your Vitals Were     Pulse Respirations Height Pulse " "Oximetry Breastfeeding? BMI (Body Mass Index)    76 19 5' 1\" (1.549 m) 99% No 39.49 kg/m2       Blood Pressure from Last 3 Encounters:   08/28/17 128/84   08/14/17 128/90   03/21/17 128/80    Weight from Last 3 Encounters:   08/28/17 209 lb (94.8 kg)   08/14/17 209 lb (94.8 kg)   03/21/17 210 lb (95.3 kg)              We Performed the Following     DIABETES EDUCATION REFERRAL ([facility])     Estimated Average Glucose     Glucose     Hemoglobin A1c          Today's Medication Changes          These changes are accurate as of: 8/28/17 11:59 PM.  If you have any questions, ask your nurse or doctor.               Start taking these medicines.        Dose/Directions    metFORMIN 500 MG tablet   Commonly known as:  GLUCOPHAGE   Used for:  Type 2 diabetes mellitus without complication, without long-term current use of insulin (H)   Started by:  Gonzalo Pino MD        Dose:  500 mg   Take 1 tablet (500 mg) by mouth daily (with dinner)   Quantity:  30 tablet   Refills:  1            Where to get your medicines      Some of these will need a paper prescription and others can be bought over the counter.  Ask your nurse if you have questions.     Bring a paper prescription for each of these medications     metFORMIN 500 MG tablet                Primary Care Provider Office Phone # Fax #    Gonzalo Pino -[phone]       [facility] [address]  [place] [address]        Equal Access to Services     [facility]CHAR AH: Hadii christiano gupta Sokalen, waaxda luqadaha, qaybta kaalmada adelakishada, tegan ordonez . So [facility] [phone].    ATENCIÓN: Si habla español, tiene a de la cruz disposición servicios gratuitos de asistencia lingüística. Llame al [phone].    We comply with applicable federal civil rights laws and [place] laws. We do not discriminate on the basis of race, color, national origin, age, disability sex, sexual orientation or gender identity.            Thank you!     " [facility] you [facility]  [facility] your [facility]. Our goal is always to provide you with excellent care. Hearing back from our patients is one way we can continue to improve our services. Please take a few minutes to complete the written survey that you may receive in the mail after your visit with us. Thank you!             Your Updated Medication List - Protect others around you: Learn how to safely use, store and throw away your medicines at www.disposemymeds.org.          This list is accurate as of: 8/28/17 11:59 PM.  Always use your most recent med list.                   Brand Name Dispense Instructions for use Diagnosis    * albuterol 108 (90 BASE) MCG/ACT Inhaler    PROAIR HFA    3 Inhaler    Inhale 2 puffs into the lungs every 6 hours    Asthma       * albuterol (2.5 MG/3ML) 0.083% neb solution     90 mL    USE 1 VIAL BY NEBULIZATION EVERY 6 HOURS AS NEEDED FOR SHORTNESS OF BREATH / DYSPNEA OR WHEEZING    Asthma exacerbation       amLODIPine 10 MG tablet    NORVASC    90 tablet    TAKE 1 TABLET BY MOUTH EVERY DAY    Essential hypertension       fluticasone 50 MCG/ACT spray    FLONASE    16 g    SPRAY 2 SPRAYS INTO BOTH NOSTRILS DAILY    Allergic rhinitis due to pollen       metFORMIN 500 MG tablet    GLUCOPHAGE    30 tablet    Take 1 tablet (500 mg) by mouth daily (with dinner)    Type 2 diabetes mellitus without complication, without long-term current use of insulin (H)       metoprolol 100 MG 24 hr tablet    TOPROL-XL    90 tablet    Take 1 tablet (100 mg) by mouth daily    Essential hypertension       SYNTHROID 200 MCG tablet   Generic drug:  levothyroxine      Take 200 mcg by mouth daily        * Notice:  This list has 2 medication(s) that are the same as other medications prescribed for you. Read the directions carefully, and ask your doctor or other care provider to review them with you.

## 2017-08-28 NOTE — PROGRESS NOTES
SUBJECTIVE:  Edith Doty, 46 year old, female is seen in follow up of abnormal labs.  She was noted to have an elevated fasting blood glucose as well as glycosuria. Edith has a strong family history of diabetes mellitus, type 2.  She denies any associated symptoms.  Edith has a history of obesity as well as hypertension.    Denies chest pain, dyspnea, decreased exercise tolerance, dizzyness, nausea, vomiting, diaphoresis, palpitations, numbness, tingling, or paresthesias.      Current Outpatient Prescriptions   Medication Sig Dispense Refill     fluticasone (FLONASE) 50 MCG/ACT spray SPRAY 2 SPRAYS INTO BOTH NOSTRILS DAILY 16 g 3     amLODIPine (NORVASC) 10 MG tablet TAKE 1 TABLET BY MOUTH EVERY DAY 90 tablet 2     metoprolol (TOPROL-XL) 100 MG 24 hr tablet Take 1 tablet (100 mg) by mouth daily 90 tablet 2     albuterol (2.5 MG/3ML) 0.083% nebulizer solution USE 1 VIAL BY NEBULIZATION EVERY 6 HOURS AS NEEDED FOR SHORTNESS OF BREATH / DYSPNEA OR WHEEZING 90 mL 3     albuterol (ALBUTEROL) 108 (90 BASE) MCG/ACT inhaler Inhale 2 puffs into the lungs every 6 hours 3 Inhaler 1     levothyroxine (SYNTHROID) 200 MCG tablet Take 200 mcg by mouth daily          Allergies   Allergen Reactions     Amoxicillin Trihydrate Itching     Augmentin       Clavulanic Acid Potassium Itching     Augmentin       Levaquin [Levofloxacin] Swelling     Penicillins Hives       Past Medical History:   Diagnosis Date     Allergic Rhinitis, Seasonal 04/25/2011     Asthma 01/01/2011     Hypertension, Benign 04/25/2011     Hypothyroidism 01/01/2011     Nontoxic uninodular goiter 11/28/2007     Prediabetes 04/25/2011     Past Surgical History:   Procedure Laterality Date     CHOLECYSTECTOMY  1996     eye surgery for strabismus at ages 2 & 4       Family History   Problem Relation Age of Onset     DIABETES Father      Other - See Comments Sister      endometriosis     Allergies Sister      environmental     Other - See Comments Mother       "fibromyalgia and endometreosis     HEART DISEASE Mother      murmer     Allergies Mother      environmental     Hypertension Brother      Allergies Brother      environmental     Asthma No family hx of      Thyroid Disease No family hx of      Social History     Social History     Marital status:      Spouse name: N/A     Number of children: N/A     Years of education: N/A     Occupational History     [employer] tuter      full-time     Social History Main Topics     Smoking status: Never Smoker     Smokeless tobacco: Never Used      Comment: no passive exposure     Alcohol use 0.0 oz/week     0 Standard drinks or equivalent per week      Comment: socially      Drug use: No     Sexual activity: Yes     Partners: Male     Other Topics Concern      Service No     Blood Transfusions Yes     Permits if needed     Caffeine Concern No     Seat Belt Yes     Self-Exams Yes     Parent/Sibling W/ Cabg, Mi Or Angioplasty Before 65f 55m? No     Social History Narrative         Review Of Systems  Constitutional, HEENT, cardiovascular, pulmonary, gi and gu systems are negative, except as otherwise noted.      OBJECTIVE:/84  Pulse 76  Resp 19  Ht 5' 1\" (1.549 m)  Wt 209 lb (94.8 kg)  SpO2 99%  Breastfeeding? No  BMI 39.49 kg/m2      Exam:  Physical Exam   Constitutional: She is oriented to person, place, and time. She appears well-developed and well-nourished. No distress.   Neurological: She is alert and oriented to person, place, and time.   Psychiatric: She has a normal mood and affect.     Other exam not repeated    Labs:  Orders Only on 08/15/2017   Component Date Value Ref Range Status     Cholesterol 08/15/2017 174  <200 mg/dL Final     Triglycerides 08/15/2017 294* <150 mg/dL Final    Comment: Borderline high:  150-199 mg/dl  High:             200-499 mg/dl  Very high:       >499 mg/dl       HDL Cholesterol 08/15/2017 52  >49 mg/dL Final     LDL Cholesterol Calculated 08/15/2017 63  <100 mg/dL " Final    Desirable:       <100 mg/dl     Non HDL Cholesterol 08/15/2017 122  <130 mg/dL Final     Sodium 08/15/2017 134  133 - 144 mmol/L Final     Potassium 08/15/2017 4.1  3.4 - 5.3 mmol/L Final     Chloride 08/15/2017 102  94 - 109 mmol/L Final     Carbon Dioxide 08/15/2017 23  20 - 32 mmol/L Final     Anion Gap 08/15/2017 9  3 - 14 mmol/L Final     Glucose 08/15/2017 212* 70 - 99 mg/dL Final     Urea Nitrogen 08/15/2017 9  7 - 30 mg/dL Final     Creatinine 08/15/2017 0.68  0.52 - 1.04 mg/dL Final     GFR Estimate 08/15/2017 >90  >60 mL/min/1.7m2 Final    Non  GFR Calc     GFR Estimate If Black 08/15/2017 >90  >60 mL/min/1.7m2 Final    African American GFR Calc     Calcium 08/15/2017 8.8  8.5 - 10.1 mg/dL Final     Bilirubin Total 08/15/2017 0.8  0.2 - 1.3 mg/dL Final     Albumin 08/15/2017 3.4  3.4 - 5.0 g/dL Final     Protein Total 08/15/2017 7.0  6.8 - 8.8 g/dL Final     Alkaline Phosphatase 08/15/2017 62  40 - 150 U/L Final     ALT 08/15/2017 39  0 - 50 U/L Final     AST 08/15/2017 33  0 - 45 U/L Final     WBC 08/15/2017 7.3  4.0 - 11.0 10e9/L Final     RBC Count 08/15/2017 4.63  3.8 - 5.2 10e12/L Final     Hemoglobin 08/15/2017 14.3  11.7 - 15.7 g/dL Final     Hematocrit 08/15/2017 42.9  35.0 - 47.0 % Final     MCV 08/15/2017 93  78 - 100 fl Final     MCH 08/15/2017 30.9  26.5 - 33.0 pg Final     MCHC 08/15/2017 33.3  31.5 - 36.5 g/dL Final     RDW 08/15/2017 12.3  10.0 - 15.0 % Final     Platelet Count 08/15/2017 297  150 - 450 10e9/L Final     Diff Method 08/15/2017 Automated Method   Final     % Neutrophils 08/15/2017 54.5  % Final     % Lymphocytes 08/15/2017 33.0  % Final     % Monocytes 08/15/2017 8.6  % Final     % Eosinophils 08/15/2017 3.2  % Final     % Basophils 08/15/2017 0.7  % Final     Absolute Neutrophil 08/15/2017 4.0  1.6 - 8.3 10e9/L Final     Absolute Lymphocytes 08/15/2017 2.4  0.8 - 5.3 10e9/L Final     Absolute Monocytes 08/15/2017 0.6  0.0 - 1.3 10e9/L Final      Absolute Eosinophils 08/15/2017 0.2  0.0 - 0.7 10e9/L Final     Absolute Basophils 08/15/2017 0.1  0.0 - 0.2 10e9/L Final     Color Urine 08/15/2017 Yellow   Final     Appearance Urine 08/15/2017 Clear   Final     Glucose Urine 08/15/2017 100* NEG mg/dL Final     Bilirubin Urine 08/15/2017 Negative  NEG Final     Ketones Urine 08/15/2017 Negative  NEG mg/dL Final     Specific Gravity Urine 08/15/2017 1.020  1.003 - 1.035 Final     Blood Urine 08/15/2017 Negative  NEG Final     pH Urine 08/15/2017 6.0  5.0 - 7.0 pH Final     Protein Albumin Urine 08/15/2017 Trace* NEG mg/dL Final     Urobilinogen Urine 08/15/2017 0.2  0.2 - 1.0 EU/dL Final     Nitrite Urine 08/15/2017 Negative  NEG Final     Leukocyte Esterase Urine 08/15/2017 Negative  NEG Final     Source 08/15/2017 Midstream Urine   Final     WBC Urine 08/15/2017 O - 2  0 - 2 /HPF Final     RBC Urine 08/15/2017 O - 2  0 - 2 /HPF Final       ASSESSMENT/PLAN:  Type 2 diabetes mellitus without complication, without long-term current use of insulin (H)  Long discussion was held. Natural history reviewed.  Begin metformin once daily.  Recommend angiotensin 2 receptor blocker and atorvastatin.  [facility]  scheduled for evaluation and recommendations for further management.   - metFORMIN (GLUCOPHAGE) 500 MG tablet; Take 1 tablet (500 mg) by mouth daily (with dinner)  - Glucose  - Hemoglobin A1c  - DIABETES EDUCATION REFERRAL ([place])  - Estimated Average Glucose  - Albumin Random Urine Quantitative with Creat Ratio; Future        Greater than 25 minutes spent with patient, of which greater than 50% was spent reviewing labs, counseling regarding management of diabetes, and coordination of care.      Gonzalo Pino MD

## 2017-08-28 NOTE — NURSING NOTE
"Chief Complaint   Patient presents with     Lab Result Notice     follow up on lipids from 2 weeks ago       Initial /84  Pulse 76  Resp 19  Ht 5' 1\" (1.549 m)  Wt 209 lb (94.8 kg)  SpO2 99%  Breastfeeding? No  BMI 39.49 kg/m2 Estimated body mass index is 39.49 kg/(m^2) as calculated from the following:    Height as of this encounter: 5' 1\" (1.549 m).    Weight as of this encounter: 209 lb (94.8 kg).  Medication Reconciliation: complete   Kelly Jaeger      "

## 2017-08-30 ENCOUNTER — TELEPHONE (OUTPATIENT)
Dept: FAMILY MEDICINE | Facility: OTHER | Age: 47
End: 2017-08-30

## 2017-08-30 NOTE — TELEPHONE ENCOUNTER
Patient is seeing Laura Mendoza on 9/12/17 for her Session 1 diabetes education and the pharmacist told the patient not to take her medications until she sees the educator. Patient is wondering what she should do.

## 2017-09-12 ENCOUNTER — HOSPITAL ENCOUNTER (OUTPATIENT)
Dept: EDUCATION SERVICES | Facility: HOSPITAL | Age: 47
Discharge: HOME | End: 2017-09-12
Attending: FAMILY MEDICINE | Admitting: FAMILY MEDICINE
Payer: COMMERCIAL

## 2017-09-12 VITALS
SYSTOLIC BLOOD PRESSURE: 148 MMHG | OXYGEN SATURATION: 97 % | WEIGHT: 208.8 LBS | HEART RATE: 76 BPM | DIASTOLIC BLOOD PRESSURE: 80 MMHG | BODY MASS INDEX: 39.42 KG/M2 | HEIGHT: 61 IN

## 2017-09-12 DIAGNOSIS — E11.65 TYPE 2 DIABETES MELLITUS WITH HYPERGLYCEMIA, WITHOUT LONG-TERM CURRENT USE OF INSULIN (H): Primary | ICD-10-CM

## 2017-09-12 PROCEDURE — G0108 DIAB MANAGE TRN  PER INDIV: HCPCS

## 2017-09-12 ASSESSMENT — PAIN SCALES - GENERAL: PAINLEVEL: NO PAIN (0)

## 2017-09-12 NOTE — DISCHARGE INSTRUCTIONS
Please test BG every am and 2 hours after evening meal.    Keep food logs.    Call Laura with any questions or concerns. [phone]       Return to Diabetes Ed in 2 weeks.

## 2017-09-12 NOTE — NURSING NOTE
"Chief Complaint   Patient presents with     Diabetes     session 1       Initial /80 (BP Location: Right arm, Patient Position: Chair, Cuff Size: Adult Large)  Pulse 76  Ht 1.549 m (5' 1\")  Wt 94.7 kg (208 lb 12.8 oz)  SpO2 97%  BMI 39.45 kg/m2 Estimated body mass index is 39.45 kg/(m^2) as calculated from the following:    Height as of this encounter: 1.549 m (5' 1\").    Weight as of this encounter: 94.7 kg (208 lb 12.8 oz).  Medication Reconciliation: complete  Eneida Marion    "

## 2017-09-12 NOTE — IP AVS SNAPSHOT
MRN:[id number]                      After Visit Summary   9/12/2017    Edith Doty    MRN: [id number]           Thank you!     Thank you for choosing [facility] for your care. Our goal is always to provide you with excellent care. Hearing back from our patients is one way we can continue to improve our services. Please take a few minutes to complete the written survey that you may receive in the mail after you visit with us. Thank you!        Patient Information     Date Of Birth          1970        About your hospital stay     You were admitted on:  September 12, 2017 You last received care in the:  [facility]    You were discharged on:  September 12, 2017       Who to Call     For medical emergencies, please call 911.  For non-urgent questions about your medical care, please call your primary care provider or clinic, [phone]          Attending Provider     Provider Specialty    Gonzalo Pino MD [facility] Practice       Primary Care Provider Office Phone # Fax #    Gonzalo Pino [phone]      Your next 10 appointments already scheduled     Sep 26, 2017  2:30 PM CDT   (Arrive by 2:15 PM)   Diabetic Education with Laura Mendoza RN   [facility] ([facility] )    [address] [place] [id number] [phone]              Further instructions from your care team       Please test BG every am and 2 hours after evening meal.    Keep food logs.    Call Laura with any questions or concerns. [phone]       Return to Diabetes Ed in 2 weeks.    Pending Results     No orders found from 9/10/2017 to 9/13/2017.            Admission Information     Date & Time Provider Department Dept. Phone    9/12/2017 Gonzalo Pino MD [facility] [phone]      Your Vitals Were     Blood Pressure Pulse Height Weight Pulse Oximetry BMI (Body Mass Index)    148/80 (BP Location: Right arm, Patient Position: Chair, Cuff Size: Adult  "Large) 76 1.549 m (5' 1\") 94.7 kg (208 lb 12.8 oz) 97% 39.45 kg/m2      [employer] Information     [employer] lets you send messages to your doctor, view your test results, renew your prescriptions, schedule appointments and more. To sign up, go to [email or web address] . Click on \"Log in\" on the left side of the screen, which will take you to the Welcome page. Then click on \"Sign up Now\" on the right side of the page.     You will be asked to enter the access code listed below, as well as some personal information. Please follow the directions to create your username and password.     Your access code is: [email or web address]  Expires: 2017  8:49 AM     Your access code will  in 90 days. If you need help or a new code, please call your [facility] clinic or [phone].        Care EveryWhere ID     This is your Care EveryWhere ID. This could be used by other organizations to access your [facility] medical records  [id number]        Equal Access to Services     SARINA PEREYRA : Hadjuanjo Weaver, ritika cody, sarai arias, tegan emery. So [facility] [phone].    ATENCIÓN: Si habla español, tiene a de la cruz disposición servicios gratuitos de asistencia lingüística. [facility] [phone].    We comply with applicable federal civil rights laws and [place] laws. We do not discriminate on the basis of race, color, national origin, age, disability sex, sexual orientation or gender identity.               Review of your medicines      UNREVIEWED medicines. Ask your doctor about these medicines        Dose / Directions    * albuterol 108 (90 BASE) MCG/ACT Inhaler   Commonly known as:  PROAIR HFA   Used for:  Asthma        Dose:  2 puff   Inhale 2 puffs into the lungs every 6 hours   Quantity:  3 Inhaler   Refills:  1       * albuterol (2.5 MG/3ML) 0.083% neb solution   Used for:  Asthma exacerbation        USE 1 VIAL BY NEBULIZATION EVERY 6 HOURS AS NEEDED FOR SHORTNESS OF " BREATH / DYSPNEA OR WHEEZING   Quantity:  90 mL   Refills:  3       amLODIPine 10 MG tablet   Commonly known as:  NORVASC   Used for:  Essential hypertension        TAKE 1 TABLET BY MOUTH EVERY DAY   Quantity:  90 tablet   Refills:  2       fluticasone 50 MCG/ACT spray   Commonly known as:  FLONASE   Used for:  Allergic rhinitis due to pollen        SPRAY 2 SPRAYS INTO BOTH NOSTRILS DAILY   Quantity:  16 g   Refills:  3       metFORMIN 500 MG tablet   Commonly known as:  GLUCOPHAGE   Used for:  Type 2 diabetes mellitus without complication, without long-term current use of insulin (H)        Dose:  500 mg   Take 1 tablet (500 mg) by mouth daily (with dinner)   Quantity:  30 tablet   Refills:  1       metoprolol 100 MG 24 hr tablet   Commonly known as:  TOPROL-XL   Used for:  Essential hypertension        Dose:  100 mg   Take 1 tablet (100 mg) by mouth daily   Quantity:  90 tablet   Refills:  2       SYNTHROID 200 MCG tablet   Generic drug:  levothyroxine        Dose:  200 mcg   Take 200 mcg by mouth daily   Refills:  0       * Notice:  This list has 2 medication(s) that are the same as other medications prescribed for you. Read the directions carefully, and ask your doctor or other care provider to review them with you.             Protect others around you: Learn how to safely use, store and throw away your medicines at www.disposemymeds.org.             Medication List: This is a list of all your medications and when to take them. Check marks below indicate your daily home schedule. Keep this list as a reference.      Medications           Morning Afternoon Evening Bedtime As Needed    * albuterol 108 (90 BASE) MCG/ACT Inhaler   Commonly known as:  PROAIR HFA   Inhale 2 puffs into the lungs every 6 hours                                * albuterol (2.5 MG/3ML) 0.083% neb solution   USE 1 VIAL BY NEBULIZATION EVERY 6 HOURS AS NEEDED FOR SHORTNESS OF BREATH / DYSPNEA OR WHEEZING                                 amLODIPine 10 MG tablet   Commonly known as:  NORVASC   TAKE 1 TABLET BY MOUTH EVERY DAY                                fluticasone 50 MCG/ACT spray   Commonly known as:  FLONASE   SPRAY 2 SPRAYS INTO BOTH NOSTRILS DAILY                                metFORMIN 500 MG tablet   Commonly known as:  GLUCOPHAGE   Take 1 tablet (500 mg) by mouth daily (with dinner)                                metoprolol 100 MG 24 hr tablet   Commonly known as:  TOPROL-XL   Take 1 tablet (100 mg) by mouth daily                                SYNTHROID 200 MCG tablet   Take 200 mcg by mouth daily   Generic drug:  levothyroxine                                * Notice:  This list has 2 medication(s) that are the same as other medications prescribed for you. Read the directions carefully, and ask your doctor or other care provider to review them with you.

## 2017-09-13 ENCOUNTER — TELEPHONE (OUTPATIENT)
Dept: EDUCATION SERVICES | Facility: HOSPITAL | Age: 47
End: 2017-09-13

## 2017-09-13 DIAGNOSIS — E11.65 TYPE 2 DIABETES MELLITUS WITH HYPERGLYCEMIA, WITHOUT LONG-TERM CURRENT USE OF INSULIN (H): ICD-10-CM

## 2017-09-13 RX ORDER — LANCETS
EACH MISCELLANEOUS
Qty: 100 EACH | Refills: 10 | Status: SHIPPED | OUTPATIENT
Start: 2017-09-13 | End: 2018-10-06

## 2017-09-13 RX ORDER — LANCETS
EACH MISCELLANEOUS
Qty: 100 EACH | Refills: 10 | Status: SHIPPED | OUTPATIENT
Start: 2017-09-13 | End: 2017-09-13

## 2017-09-13 NOTE — PROGRESS NOTES
"Edith is here for Session 1. This is a new diagnosis. Edith has a family history of diabetes    BG readings as follows: Not checking yet    Lab Results   Component Value Date    A1C 8.8 08/28/2017       Blood pressure 148/80, pulse 76, height 1.549 m (5' 1\"), weight 94.7 kg (208 lb 12.8 oz), SpO2 97 %, not currently breastfeeding.    Current diabetes medications: Metformin 500 mg daily at supper - did have some GI upset/diarrhea but that has eased    Readiness to learn: eager    Barriers to learning: none    Reviewed usual foods eaten from food log. Eats 3x daily. Beverages: orange juice, water, iced tea.    Breakfast: cereal with milk and banana. Weekends: eggs, sausage, OJ  Lunch: 1 slice pizza/1 piece cheesy bread, chicken breast or tacos  Supper: salad with chicken breast  Or cheese burger with cantaloupe, or meatloaf with corn and rice a last.    Topics covered: Diabetes pathophysiology, symptoms, diagnosis, treatments, medication actions, BG self-monitoring, HgbA1c, targets (blood sugar/A1c), sharps disposal, complications and risk factors,  food planning, benefits of physical activity, carbohydrate counting, and individualized food plan.   [employer] Contour Next glucose meter and appropriately demonstrated use with minimal cueing. Testing supplies ordered.     Plan:    Please test BG every am and 2 hours after evening meal.    Keep food logs.    Call Laura with any questions or concerns. [phone]       Return to Diabetes Ed in 2 weeks.      Total time spent with patient: 75 minutes    "

## 2017-09-26 ENCOUNTER — TELEPHONE (OUTPATIENT)
Dept: EDUCATION SERVICES | Facility: HOSPITAL | Age: 47
End: 2017-09-26

## 2017-09-26 ENCOUNTER — HOSPITAL ENCOUNTER (OUTPATIENT)
Dept: EDUCATION SERVICES | Facility: HOSPITAL | Age: 47
Discharge: HOME | End: 2017-09-26
Attending: FAMILY MEDICINE | Admitting: FAMILY MEDICINE
Payer: COMMERCIAL

## 2017-09-26 VITALS
SYSTOLIC BLOOD PRESSURE: 146 MMHG | HEART RATE: 71 BPM | WEIGHT: 203.2 LBS | DIASTOLIC BLOOD PRESSURE: 86 MMHG | BODY MASS INDEX: 38.36 KG/M2 | HEIGHT: 61 IN | OXYGEN SATURATION: 97 %

## 2017-09-26 DIAGNOSIS — E11.9 TYPE 2 DIABETES MELLITUS WITHOUT COMPLICATION, WITHOUT LONG-TERM CURRENT USE OF INSULIN (H): ICD-10-CM

## 2017-09-26 PROCEDURE — G0108 DIAB MANAGE TRN  PER INDIV: HCPCS

## 2017-09-26 ASSESSMENT — PAIN SCALES - GENERAL: PAINLEVEL: NO PAIN (0)

## 2017-09-26 NOTE — DISCHARGE INSTRUCTIONS
Increase Metformin:  First start with 1 tablet (500 mg) am and 1 tablet (500 mg) pm.    If tolerating after 3-4 days, may increase to 1 tablet (500 mg) am and 2 tablets (1000 mg). If not tolerating, ok to give it more time.    Once tolerating 1 tab am and 2 tabs pm, may increase to 2 tabs (1000 mg) twice a day.    Return to Diabetes Ed one month.

## 2017-09-26 NOTE — IP AVS SNAPSHOT
"                  MRN:[id number]                      After Visit Summary   9/26/2017    Edith Doty    MRN: [id number]           Thank you!     Thank you for choosing [facility] for your care. Our goal is always to provide you with excellent care. Hearing back from our patients is one way we can continue to improve our services. Please take a few minutes to complete the written survey that you may receive in the mail after you visit with us. Thank you!        Patient Information     Date Of Birth          1970        About your hospital stay     You were admitted on:  September 26, 2017 You last received care in the:  [facility]    You were discharged on:  September 26, 2017       Who to Call     For medical emergencies, please call 911.  For non-urgent questions about your medical care, please call your primary care provider or clinic, [phone]          Attending Provider     Provider Specialty    Gonzalo Pino MD [facility]       Primary Care Provider Office Phone # Fax #    Gonzalo Pino [phone]      Further instructions from your care team       Increase Metformin:  First start with 1 tablet (500 mg) am and 1 tablet (500 mg) pm.    If tolerating after 3-4 days, may increase to 1 tablet (500 mg) am and 2 tablets (1000 mg). If not tolerating, ok to give it more time.    Once tolerating 1 tab am and 2 tabs pm, may increase to 2 tabs (1000 mg) twice a day.    Return to Diabetes Ed one month.    Pending Results     No orders found from 9/24/2017 to 9/27/2017.            Admission Information     Date & Time Provider Department Dept. Phone    9/26/2017 Gonzalo Pino MD [facility] [phone]      Your Vitals Were     Blood Pressure Pulse Height Weight Pulse Oximetry BMI (Body Mass Index)    146/86 (BP Location: Right arm, Patient Position: Chair, Cuff Size: Adult Large) 71 1.549 m (5' 1\") 92.2 kg (203 lb 3.2 oz) 97% 38.39 kg/m2      MyChart " "Information     [employer] lets you send messages to your doctor, view your test results, renew your prescriptions, schedule appointments and more. To sign up, go to [email or web address] . Click on \"Log in\" on the left side of the screen, which will take you to the Welcome page. Then click on \"Sign up Now\" on the right side of the page.     You will be asked to enter the access code listed below, as well as some personal information. Please follow the directions to create your username and password.     Your access code is: [email or web address]  Expires: 2017  8:49 AM     Your access code will  in 90 days. If you need help or a new code, please call your [facility] clinic or [phone].        Care EveryWhere ID     This is your Care EveryWhere ID. This could be used by other organizations to access your [facility] medical records  [id number]        Equal Access to Services     SARINA PEREYRA AH: Hadjuanjo jacksono Sokalen, waaxda lumaricruz, qaybta kaalmada adealmita, tegan ordonez . So [facility] [phone].    ATENCIÓN: Si habla español, tiene a de la cruz disposición servicios gratuitos de asistencia lingüística. Llame al [phone].    We comply with applicable federal civil rights laws and [place] laws. We do not discriminate on the basis of race, color, national origin, age, disability sex, sexual orientation or gender identity.               Review of your medicines      UNREVIEWED medicines. Ask your doctor about these medicines        Dose / Directions    * albuterol 108 (90 BASE) MCG/ACT Inhaler   Commonly known as:  PROAIR HFA   Used for:  Asthma        Dose:  2 puff   Inhale 2 puffs into the lungs every 6 hours   Quantity:  3 Inhaler   Refills:  1       * albuterol (2.5 MG/3ML) 0.083% neb solution   Used for:  Asthma exacerbation        USE 1 VIAL BY NEBULIZATION EVERY 6 HOURS AS NEEDED FOR SHORTNESS OF BREATH / DYSPNEA OR WHEEZING   Quantity:  90 mL   Refills:  3       amLODIPine 10 " MG tablet   Commonly known as:  NORVASC   Used for:  Essential hypertension        TAKE 1 TABLET BY MOUTH EVERY DAY   Quantity:  90 tablet   Refills:  2       fluticasone 50 MCG/ACT spray   Commonly known as:  FLONASE   Used for:  Allergic rhinitis due to pollen        SPRAY 2 SPRAYS INTO BOTH NOSTRILS DAILY   Quantity:  16 g   Refills:  3       metFORMIN 500 MG tablet   Commonly known as:  GLUCOPHAGE   Used for:  Type 2 diabetes mellitus without complication, without long-term current use of insulin (H)        Dose:  500 mg   Take 1 tablet (500 mg) by mouth daily (with dinner)   Quantity:  30 tablet   Refills:  1       metoprolol 100 MG 24 hr tablet   Commonly known as:  TOPROL-XL   Used for:  Essential hypertension        Dose:  100 mg   Take 1 tablet (100 mg) by mouth daily   Quantity:  90 tablet   Refills:  2       SYNTHROID 200 MCG tablet   Generic drug:  levothyroxine        Dose:  200 mcg   Take 200 mcg by mouth daily   Refills:  0       * Notice:  This list has 2 medication(s) that are the same as other medications prescribed for you. Read the directions carefully, and ask your doctor or other care provider to review them with you.      CONTINUE these medicines which have NOT CHANGED        Dose / Directions    blood glucose monitoring test strip   Commonly known as:  RACHAEL CONTOUR NEXT   Used for:  Type 2 diabetes mellitus with hyperglycemia, without long-term current use of insulin (H)        Use to test blood sugar 2 times daily.   Quantity:  100 each   Refills:  10       MICROLET LANCETS Misc   Used for:  Type 2 diabetes mellitus with hyperglycemia, without long-term current use of insulin (H)        Test 2 times daily   Quantity:  100 each   Refills:  10                Protect others around you: Learn how to safely use, store and throw away your medicines at www.disposemymeds.org.             Medication List: This is a list of all your medications and when to take them. Check marks below indicate your  daily home schedule. Keep this list as a reference.      Medications           Morning Afternoon Evening Bedtime As Needed    * albuterol 108 (90 BASE) MCG/ACT Inhaler   Commonly known as:  PROAIR HFA   Inhale 2 puffs into the lungs every 6 hours                                * albuterol (2.5 MG/3ML) 0.083% neb solution   USE 1 VIAL BY NEBULIZATION EVERY 6 HOURS AS NEEDED FOR SHORTNESS OF BREATH / DYSPNEA OR WHEEZING                                amLODIPine 10 MG tablet   Commonly known as:  NORVASC   TAKE 1 TABLET BY MOUTH EVERY DAY                                blood glucose monitoring test strip   Commonly known as:  [employer] CONTOUR NEXT   Use to test blood sugar 2 times daily.                                fluticasone 50 MCG/ACT spray   Commonly known as:  FLONASE   SPRAY 2 SPRAYS INTO BOTH NOSTRILS DAILY                                metFORMIN 500 MG tablet   Commonly known as:  GLUCOPHAGE   Take 1 tablet (500 mg) by mouth daily (with dinner)                                metoprolol 100 MG 24 hr tablet   Commonly known as:  TOPROL-XL   Take 1 tablet (100 mg) by mouth daily                                MICROLET LANCETS Misc   Test 2 times daily                                SYNTHROID 200 MCG tablet   Take 200 mcg by mouth daily   Generic drug:  levothyroxine                                * Notice:  This list has 2 medication(s) that are the same as other medications prescribed for you. Read the directions carefully, and ask your doctor or other care provider to review them with you.

## 2017-09-26 NOTE — NURSING NOTE
"Chief Complaint   Patient presents with     Diabetes     session 2       Initial /86 (BP Location: Right arm, Patient Position: Chair, Cuff Size: Adult Large)  Pulse 71  Ht 1.549 m (5' 1\")  Wt 92.2 kg (203 lb 3.2 oz)  SpO2 97%  BMI 38.39 kg/m2 Estimated body mass index is 38.39 kg/(m^2) as calculated from the following:    Height as of this encounter: 1.549 m (5' 1\").    Weight as of this encounter: 92.2 kg (203 lb 3.2 oz).  Medication Reconciliation: complete   Eneida Marion      "

## 2017-09-27 NOTE — PROGRESS NOTES
"Edith here for session 2.     BG readings as follows: fastin, 169-190 and 129, 159-217. Edith is concerned that BGs are above target.  Lab Results   Component Value Date    A1C 8.8 2017         Blood pressure 146/86, pulse 71, height 1.549 m (5' 1\"), weight 92.2 kg (203 lb 3.2 oz), SpO2 97 %, not currently breastfeeding.    Current diabetes medications: Metformin 500 mg daily    Readiness to learn: eager    Barriers to learning: none    Reviewed food logs. Edith is doing well at counting carbs. Discussed suggestions for minor changes    Pt actively participated and demonstrated adequate understanding of topics discussed.     Topics covered: evaluating BG self-test results & improving self-testing skills, meter maintenance & , causes & treatment for high & low blood sugar, sick day management skills, diabetes success plan - behavior change goals set, carbohydrate counting review, strategies for food selection when eating away from home and alcohol and diabetes.       Goals: Lose weight and exercise - confidence level: I think I can    Plan:   Continue food logs  Will titrate Metformin up to 1000 mg bid    Follow up: One month for Session 3    Total time spent with patient: 60 minutes      "

## 2017-09-27 NOTE — TELEPHONE ENCOUNTER
Metformin         Last Written Prescription Date: 9/26/17  Last Fill Quantity: 120, # refills: 2  Last Office Visit with [facility], [facility] or [facility] prescribing provider:  8/28/17        BP Readings from Last 3 Encounters:   09/26/17 146/86   09/12/17 148/80   08/28/17 128/84     No results found for: MICROL  No results found for: UMALCR  Creatinine   Date Value Ref Range Status   08/15/2017 0.68 0.52 - 1.04 mg/dL Final   ]  GFR Estimate   Date Value Ref Range Status   08/15/2017 >90 >60 mL/min/1.7m2 Final     Comment:     Non  GFR Calc   09/01/2013 70 >60 mL/min/1.7m2 Final     GFR Estimate If Black   Date Value Ref Range Status   08/15/2017 >90 >60 mL/min/1.7m2 Final     Comment:      GFR Calc   09/01/2013 84 >60 mL/min/1.7m2 Final     Lab Results   Component Value Date    CHOL 174 08/15/2017     Lab Results   Component Value Date    HDL 52 08/15/2017     Lab Results   Component Value Date    LDL 63 08/15/2017     Lab Results   Component Value Date    TRIG 294 08/15/2017     No results found for: CHOLHDLRATIO  Lab Results   Component Value Date    AST 33 08/15/2017     Lab Results   Component Value Date    ALT 39 08/15/2017     Lab Results   Component Value Date    A1C 8.8 08/28/2017     Potassium   Date Value Ref Range Status   08/15/2017 4.1 3.4 - 5.3 mmol/L Final

## 2017-09-29 ENCOUNTER — TELEPHONE (OUTPATIENT)
Dept: FAMILY MEDICINE | Facility: OTHER | Age: 47
End: 2017-09-29

## 2017-09-29 NOTE — TELEPHONE ENCOUNTER
Called patient And informed that Dr. Pino said that if she were to have a reaction from the metformin it would have been when she started it. Most likely caused from something else. Advised that if it got worse to go to ER.

## 2017-09-29 NOTE — TELEPHONE ENCOUNTER
7:52 AM    Reason for Call: Phone Call    Description: Pt called and stated her Metformin recently was upped from 500 mg to 1000 mg and starting to increase up to 2000 mg a day. She has started to have some throat and tongue swelling today and would like nurse to call her right away to see if it could be a side effect to the med. Please call her back at [phone]. Pt was also advised it if gets worse to go to ER.     Was an appointment offered for this call? No  If yes : Appointment type              Date    Preferred method for responding to this message: Telephone Call  What is your phone number ?    If we cannot reach you directly, may we leave a detailed response at the number you provided? Yes    Can this message wait until your PCP/provider returns, if available today? Not applicable    Estefania Gore

## 2017-10-24 ENCOUNTER — HOSPITAL ENCOUNTER (OUTPATIENT)
Dept: EDUCATION SERVICES | Facility: HOSPITAL | Age: 47
Discharge: HOME | End: 2017-10-24
Attending: FAMILY MEDICINE | Admitting: FAMILY MEDICINE
Payer: COMMERCIAL

## 2017-10-24 VITALS
SYSTOLIC BLOOD PRESSURE: 140 MMHG | OXYGEN SATURATION: 95 % | DIASTOLIC BLOOD PRESSURE: 89 MMHG | HEART RATE: 79 BPM | WEIGHT: 200.9 LBS | HEIGHT: 61 IN | BODY MASS INDEX: 37.93 KG/M2

## 2017-10-24 DIAGNOSIS — E11.65 TYPE 2 DIABETES MELLITUS WITH HYPERGLYCEMIA, WITHOUT LONG-TERM CURRENT USE OF INSULIN (H): Primary | ICD-10-CM

## 2017-10-24 PROCEDURE — G0108 DIAB MANAGE TRN  PER INDIV: HCPCS

## 2017-10-24 ASSESSMENT — PAIN SCALES - GENERAL: PAINLEVEL: NO PAIN (0)

## 2017-10-24 NOTE — PROGRESS NOTES
"Edith here for session 3.    BG readings as follows: fastin, 123-156 and post-supper: 119-177, 231. Edith is concerned that her BG are not all in target yet. We compared current readings to readings from our last visit. Edith was able to see how much her readings have trended down over last month.    Blood pressure 140/89, pulse 79, height 1.549 m (5' 1\"), weight 91.1 kg (200 lb 14.4 oz), SpO2 95 %, not currently breastfeeding.    Current diabetes medications: Metformin 1000 mg bid - has been at full dose approximately 2.5 weeks    Readiness to learn: eager    Barriers to learning: none    Pt actively participated and demonstrated adequate understanding of topics discussed.     Topics covered: BG self-test and A1c connection, evaluating records to better understand highs and lows, how diabetes and therapies will change over time, expectations of future primary care visits, prevention of complications, foot care skills, importance of choosing unsaturated fats, blood pressure and lipid targets,  and heart healthy guidelines.     Foot exam is due    Plan: Continue current diabetes management plan    Follow up:   One month at Session 4    Total time spent with patient: 60 minutes      "

## 2017-10-24 NOTE — NURSING NOTE
"Chief Complaint   Patient presents with     Diabetes     session 3       Initial /89  Pulse 79  Ht 1.549 m (5' 1\")  Wt 91.1 kg (200 lb 14.4 oz)  SpO2 95%  BMI 37.96 kg/m2 Estimated body mass index is 37.96 kg/(m^2) as calculated from the following:    Height as of this encounter: 1.549 m (5' 1\").    Weight as of this encounter: 91.1 kg (200 lb 14.4 oz).  Medication Reconciliation: complete   Eneida Marion      "

## 2017-11-11 DIAGNOSIS — I10 ESSENTIAL HYPERTENSION: ICD-10-CM

## 2017-11-13 NOTE — TELEPHONE ENCOUNTER
Metoprolol      Last Written Prescription Date: 3/14/17  Last Fill Quantity: 90,  # refills: 2   Last Office Visit with [facility], P or [facility] prescribing provider: 8/28/17

## 2017-11-15 RX ORDER — METOPROLOL SUCCINATE 100 MG/1
TABLET, EXTENDED RELEASE ORAL
Qty: 90 TABLET | Refills: 1 | Status: SHIPPED | OUTPATIENT
Start: 2017-11-15 | End: 2018-05-10

## 2017-11-26 ENCOUNTER — HEALTH MAINTENANCE LETTER (OUTPATIENT)
Age: 47
End: 2017-11-26

## 2017-12-11 DIAGNOSIS — J30.1 ALLERGIC RHINITIS DUE TO POLLEN: ICD-10-CM

## 2017-12-11 NOTE — TELEPHONE ENCOUNTER
Fluticasone      Last Written Prescription Date: 8/17/17  Last Fill Quantity: 16g,  # refills: 3   Last Office Visit with [facility], [facility] or [facility] prescribing provider: 8/28/17                                         Next 5 appointments (look out 90 days)     Dec 18, 2017  2:30 PM CST   (Arrive by 2:15 PM)   Return Visit with Laura Mendoza RN   [facility] ([facility] )    [address] [place] [id number] [phone]

## 2017-12-13 RX ORDER — FLUTICASONE PROPIONATE 50 MCG
SPRAY, SUSPENSION (ML) NASAL
Qty: 16 G | Refills: 3 | Status: SHIPPED | OUTPATIENT
Start: 2017-12-13 | End: 2018-05-03

## 2017-12-18 ENCOUNTER — HOSPITAL ENCOUNTER (OUTPATIENT)
Dept: EDUCATION SERVICES | Facility: HOSPITAL | Age: 47
Discharge: HOME | End: 2017-12-18
Attending: FAMILY MEDICINE | Admitting: FAMILY MEDICINE
Payer: COMMERCIAL

## 2017-12-18 VITALS
OXYGEN SATURATION: 96 % | HEIGHT: 61 IN | WEIGHT: 196.4 LBS | SYSTOLIC BLOOD PRESSURE: 159 MMHG | DIASTOLIC BLOOD PRESSURE: 84 MMHG | BODY MASS INDEX: 37.08 KG/M2 | HEART RATE: 77 BPM

## 2017-12-18 DIAGNOSIS — E11.65 TYPE 2 DIABETES MELLITUS WITH HYPERGLYCEMIA, WITHOUT LONG-TERM CURRENT USE OF INSULIN (H): Primary | ICD-10-CM

## 2017-12-18 DIAGNOSIS — E11.9 TYPE 2 DIABETES MELLITUS WITHOUT COMPLICATION, WITHOUT LONG-TERM CURRENT USE OF INSULIN (H): ICD-10-CM

## 2017-12-18 LAB — HBA1C MFR BLD: 6.5 % (ref 0–5.7)

## 2017-12-18 PROCEDURE — 83036 HEMOGLOBIN GLYCOSYLATED A1C: CPT

## 2017-12-18 PROCEDURE — 36416 COLLJ CAPILLARY BLOOD SPEC: CPT

## 2017-12-18 PROCEDURE — G0108 DIAB MANAGE TRN  PER INDIV: HCPCS

## 2017-12-18 ASSESSMENT — PAIN SCALES - GENERAL: PAINLEVEL: NO PAIN (0)

## 2017-12-18 NOTE — NURSING NOTE
"Chief Complaint   Patient presents with     Diabetes     session 4       Initial /84  Pulse 77  Ht 1.549 m (5' 1\")  Wt 89.1 kg (196 lb 6.4 oz)  SpO2 96%  BMI 37.11 kg/m2 Estimated body mass index is 37.11 kg/(m^2) as calculated from the following:    Height as of this encounter: 1.549 m (5' 1\").    Weight as of this encounter: 89.1 kg (196 lb 6.4 oz).  Medication Reconciliation: complete   Eneida Marion      "

## 2017-12-18 NOTE — PROGRESS NOTES
"Edith is here for Session 4.     BG readings as follows: readings range as follows: fastin-124, 137, 138, 146 and post-supper: 114-144, 166, 189, 202    Edith expresses concern that she would like to see more of morning BGs within the target of . I did ask her to give it more time and to continue to work on meal plan and exercise to promote weight loss. Reviewed current BG averages, current A1C and weight loss since diagnosis.    Lab Results   Component Value Date    A1C 6.5 2017    A1C 8.8 2017       Blood pressure 159/84, pulse 77, height 1.549 m (5' 1\"), weight 89.1 kg (196 lb 6.4 oz), SpO2 96 %, not currently breastfeeding. BP rechecked at 156/88. She states that she is stressed about planning for trip to [place] next week.    Weight loss since first Diabetes Ed appointment 14 lbs    Current diabetes medications: Metformin 1000 mg bid    Readiness to learn: eager    Barriers to learning: none    Pt actively participated in the session and continues to demonstrate motivation.     Topics covered: Diabetes success plan behavior change goal review, developing problem solving skills, stress and how it affects blood sugar, relationship between diabetes and depression. Symptoms of depression and how they affect diabetes self-care. Rationale for consistent self-care and regular diabetes care visits, identify medical tests/exams needed for regular diabetes care. Diabetes success plan behavior change goal reviewed. Identify community resources for ongoing education and support.     Pts A1c tested today 6.5 improved from 8.8    Pt goals updated. Weight loss continued at 50%. Exercise at 25%    Follow up Diabetes self-management support plan: Contact PCP and diabetes educator with questions/concerns    Will follow 2018 and prn    Total visit time: 45 minutes    "

## 2017-12-18 NOTE — LETTER
Dear Edith,  Here is a copy of your most recent A1C for your review. If you have any questions please call [phone].  Results for orders placed or performed during the hospital encounter of 12/18/17   Hemoglobin A1c   Result Value Ref Range    Hemoglobin A1C 6.5 (A) 4.3 - 6.0 %     Dr. Gonzalo Pino

## 2017-12-21 ENCOUNTER — TELEPHONE (OUTPATIENT)
Dept: EDUCATION SERVICES | Facility: HOSPITAL | Age: 47
End: 2017-12-21

## 2017-12-21 NOTE — TELEPHONE ENCOUNTER
Please see documentation for last visit with Edith. BP elevated after 2 re-checks. She discusses being stressed with planning for upcoming holiday, trip and medical appointments for daughter.

## 2018-01-03 DIAGNOSIS — E11.9 TYPE 2 DIABETES MELLITUS WITHOUT COMPLICATION, WITHOUT LONG-TERM CURRENT USE OF INSULIN (H): ICD-10-CM

## 2018-01-03 NOTE — TELEPHONE ENCOUNTER
Metformin       Last Written Prescription Date: 12/20/17  Last Fill Quantity: 120,  # refills: 4   Last Office Visit with G, P or [facility] prescribing provider: 8/28/17

## 2018-01-31 DIAGNOSIS — E11.9 TYPE 2 DIABETES MELLITUS WITHOUT COMPLICATION, WITHOUT LONG-TERM CURRENT USE OF INSULIN (H): ICD-10-CM

## 2018-03-05 DIAGNOSIS — I10 ESSENTIAL HYPERTENSION: ICD-10-CM

## 2018-03-06 RX ORDER — AMLODIPINE BESYLATE 10 MG/1
TABLET ORAL
Qty: 90 TABLET | Refills: 0 | Status: SHIPPED | OUTPATIENT
Start: 2018-03-06 | End: 2018-07-05

## 2018-03-23 ENCOUNTER — OFFICE VISIT (OUTPATIENT)
Dept: FAMILY MEDICINE | Facility: OTHER | Age: 48
End: 2018-03-23
Attending: PHYSICIAN ASSISTANT
Payer: COMMERCIAL

## 2018-03-23 VITALS
BODY MASS INDEX: 35.53 KG/M2 | SYSTOLIC BLOOD PRESSURE: 152 MMHG | DIASTOLIC BLOOD PRESSURE: 90 MMHG | HEIGHT: 61 IN | OXYGEN SATURATION: 98 % | WEIGHT: 188.2 LBS | HEART RATE: 70 BPM | TEMPERATURE: 97.9 F

## 2018-03-23 DIAGNOSIS — R07.0 THROAT PAIN: Primary | ICD-10-CM

## 2018-03-23 DIAGNOSIS — I10 BENIGN ESSENTIAL HYPERTENSION: ICD-10-CM

## 2018-03-23 LAB
DEPRECATED S PYO AG THROAT QL EIA: NORMAL
SPECIMEN SOURCE: NORMAL

## 2018-03-23 PROCEDURE — 87880 STREP A ASSAY W/OPTIC: CPT | Mod: ZL | Performed by: PHYSICIAN ASSISTANT

## 2018-03-23 PROCEDURE — 99213 OFFICE O/P EST LOW 20 MIN: CPT | Performed by: PHYSICIAN ASSISTANT

## 2018-03-23 PROCEDURE — G0463 HOSPITAL OUTPT CLINIC VISIT: HCPCS

## 2018-03-23 PROCEDURE — 87081 CULTURE SCREEN ONLY: CPT | Mod: ZL | Performed by: PHYSICIAN ASSISTANT

## 2018-03-23 RX ORDER — LISINOPRIL 5 MG/1
5 TABLET ORAL DAILY
Qty: 30 TABLET | Refills: 1 | Status: SHIPPED | OUTPATIENT
Start: 2018-03-23 | End: 2018-04-24

## 2018-03-23 ASSESSMENT — ANXIETY QUESTIONNAIRES
7. FEELING AFRAID AS IF SOMETHING AWFUL MIGHT HAPPEN: NOT AT ALL
5. BEING SO RESTLESS THAT IT IS HARD TO SIT STILL: NOT AT ALL
3. WORRYING TOO MUCH ABOUT DIFFERENT THINGS: NOT AT ALL
1. FEELING NERVOUS, ANXIOUS, OR ON EDGE: NOT AT ALL
GAD7 TOTAL SCORE: 0
6. BECOMING EASILY ANNOYED OR IRRITABLE: NOT AT ALL
2. NOT BEING ABLE TO STOP OR CONTROL WORRYING: NOT AT ALL
4. TROUBLE RELAXING: NOT AT ALL

## 2018-03-23 ASSESSMENT — PAIN SCALES - GENERAL: PAINLEVEL: MILD PAIN (3)

## 2018-03-23 NOTE — NURSING NOTE
"Chief Complaint   Patient presents with     URI       Initial /80  Pulse 70  Temp 97.9  F (36.6  C)  Ht 5' 1\" (1.549 m)  Wt 188 lb 3.2 oz (85.4 kg)  SpO2 98%  Breastfeeding? No  BMI 35.56 kg/m2 Estimated body mass index is 35.56 kg/(m^2) as calculated from the following:    Height as of this encounter: 5' 1\" (1.549 m).    Weight as of this encounter: 188 lb 3.2 oz (85.4 kg).  Medication Reconciliation: complete   [facility]   Medical Assistant      "

## 2018-03-23 NOTE — PROGRESS NOTES
SUBJECTIVE:   Edith Doty is a 47 year old female who presents to clinic today for the following health issues:      RESPIRATORY SYMPTOMS      Duration: Couple of days    Description  nasal congestion, sore throat, wheezing, chills and fatigue/malaise    Severity: mild    Accompanying signs and symptoms: mucous pale yellow when blowing nose.    History (predisposing factors):  strep exposure and Flu works in school.    Precipitating or alleviating factors: None    Therapies tried and outcome:  rest and fluids Asprin 325mg not any better.          Problem list and histories reviewed & adjusted, as indicated.  Additional history: as documented    Patient Active Problem List   Diagnosis     Hypothyroidism, acquired     Asthma, mild intermittent     Seasonal allergic rhinitis     HTN (hypertension)     Prediabetes     Nontoxic uninodular goiter     Obesity     Diabetes mellitus, type 2 (H)     Past Surgical History:   Procedure Laterality Date     CHOLECYSTECTOMY  1996     eye surgery for strabismus at ages 2 & 4         Social History   Substance Use Topics     Smoking status: Never Smoker     Smokeless tobacco: Never Used      Comment: no passive exposure     Alcohol use 0.0 oz/week     0 Standard drinks or equivalent per week      Comment: socially      Family History   Problem Relation Age of Onset     DIABETES Father      Other - See Comments Sister      endometriosis     Allergies Sister      environmental     Other - See Comments Mother      fibromyalgia and endometreosis     HEART DISEASE Mother      murmer     Allergies Mother      environmental     Hypertension Brother      Allergies Brother      environmental     Asthma No family hx of      Thyroid Disease No family hx of          Current Outpatient Prescriptions   Medication Sig Dispense Refill     amLODIPine (NORVASC) 10 MG tablet TAKE 1 TABLET BY MOUTH EVERY DAY 90 tablet 0     metFORMIN (GLUCOPHAGE) 500 MG tablet TAKE 2 TABLETS BY MOUTH TWICE DAILY  "WITH MEALS 120 tablet 2     fluticasone (FLONASE) 50 MCG/ACT spray INHALE 2 SPRAYS INTO BOTH NOSTRILS DAILY 16 g 3     metoprolol (TOPROL-XL) 100 MG 24 hr tablet TAKE 1 TABLET BY MOUTH ONCE DAILY 90 tablet 1     blood glucose monitoring (RACHAEL CONTOUR NEXT) test strip Use to test blood sugar 2 times daily. 100 each 10     MICROLET LANCETS MISC Test 2 times daily 100 each 10     albuterol (2.5 MG/3ML) 0.083% nebulizer solution USE 1 VIAL BY NEBULIZATION EVERY 6 HOURS AS NEEDED FOR SHORTNESS OF BREATH / DYSPNEA OR WHEEZING 90 mL 3     albuterol (ALBUTEROL) 108 (90 BASE) MCG/ACT inhaler Inhale 2 puffs into the lungs every 6 hours 3 Inhaler 1     levothyroxine (SYNTHROID) 200 MCG tablet Take 200 mcg by mouth daily       Allergies   Allergen Reactions     Amoxicillin Trihydrate Itching     Augmentin       Clavulanic Acid Potassium Itching     Augmentin       Levaquin [Levofloxacin] Swelling     Penicillins Hives     BP Readings from Last 3 Encounters:   03/23/18 152/90   12/18/17 159/84   10/24/17 140/89    Wt Readings from Last 3 Encounters:   03/23/18 188 lb 3.2 oz (85.4 kg)   12/18/17 196 lb 6.4 oz (89.1 kg)   10/24/17 200 lb 14.4 oz (91.1 kg)                    Reviewed and updated as needed this visit by clinical staff  Tobacco  Allergies  Meds  Med Hx  Surg Hx  Fam Hx  Soc Hx      Reviewed and updated as needed this visit by Provider         ROS:  Constitutional, neuro, ENT, endocrine, pulmonary, cardiac, gastrointestinal, genitourinary, musculoskeletal, integument and psychiatric systems are negative, except as otherwise noted.    OBJECTIVE:                                                    /90 (BP Location: Left arm, Patient Position: Chair, Cuff Size: Adult Large)  Pulse 70  Temp 97.9  F (36.6  C)  Ht 5' 1\" (1.549 m)  Wt 188 lb 3.2 oz (85.4 kg)  SpO2 98%  Breastfeeding? No  BMI 35.56 kg/m2  Body mass index is 35.56 kg/(m^2).  GENERAL APPEARANCE: healthy, alert and no distress  EYES: Eyes " grossly normal to inspection, PERRL and conjunctivae and sclerae normal  HENT: ear canals and TM's normal and nose and mouth without ulcers or lesions  NECK: no adenopathy, no asymmetry, masses, or scars and thyroid normal to palpation  RESP: lungs clear to auscultation - no rales, rhonchi or wheezes  CV: regular rates and rhythm, normal S1 S2, no S3 or S4 and no murmur, click or rub  MS: extremities normal- no gross deformities noted  SKIN: no suspicious lesions or rashes  NEURO: Normal strength and tone, mentation intact and speech normal  PSYCH: mentation appears normal and affect normal/bright    Diagnostic test results:  Diagnostic Test Results:  Results for orders placed or performed in visit on 03/23/18 (from the past 24 hour(s))   Rapid strep screen   Result Value Ref Range    Specimen Description Throat     Rapid Strep A Screen       NEGATIVE: No Group A streptococcal antigen detected by immunoassay, await culture report.        ASSESSMENT/PLAN:                                                    1. Throat pain  Negative. Exam and negative culture.   - Rapid strep screen  - Beta strep group A culture    2. Benign essential hypertension  Has been high for 6 months. We will add in Zestril. See Dr. Pino in one month.   - lisinopril (PRINIVIL/ZESTRIL) 5 MG tablet; Take 1 tablet (5 mg) by mouth daily  Dispense: 30 tablet; Refill: 1      See Patient Instructions    LALY Ortega  [facility]

## 2018-03-23 NOTE — PATIENT INSTRUCTIONS
Thank you for choosing [facility].   I have office hours 8:00 am to 4:30 pm on Monday's, Wednesday's, Thursday's and Friday's. My nurse and I are out of the office every Tuesday.    Following your visit, when your labs and diagnostic testing have returned, I will review then and you will be contacted by my nurse.  If you are on My Chart, you can also view results there.    For refills, notify your pharmacy regarding what you need and the pharmacy will generate a refill request. Do not call my nurse as she is unable to process refill request. Please plan ahead and allow 3-5 days for refill requests.    You will generally receive a reminder call the day prior to your appointment.  If you cannot attend your appointment, please cancel your appointment with as much notice as possible.  If there is a pattern of failure to present for your appointments, I cannot provide consistent, meaningful, ongoing care for you. It is very important to me that you come in for your care, so we can best assist you with your health care needs.    IMPORTANT:  Please note that it is my standard of practice to NOT participate in prescribing ongoing requested Narcotic Analgesic therapy, and/or participate in the prescribing of other controlled substances.  My nurse and I am happy to assist you with the process of referral for alternative pain management as needed, and other treatment modalities including but not limited to:  Physical Therapy, Physical Medicine and Rehab, Counseling, Chiropractic Care, Orthopedic Care, and non-narcotic medication management.     In the event that you need to be seen for emergent concerns and I am out of office,  please see one of my colleagues for acute concerns.  You may also present to  or ER.  I appreciate the opportunity to serve you and look forward to supporting your healthcare needs in the future. Please contact me with any questions or concerns that you may  have.    Sincerely,      Linn Ortiz RN, PA-C

## 2018-03-23 NOTE — MR AVS SNAPSHOT
After Visit Summary   3/23/2018    Edith Doty    MRN: [id number]           Patient Information     Date Of Birth          1970        Visit Information        Provider Department      3/23/2018 1:15 PM Linn Ortiz PA [facility]        Today's Diagnoses     Throat pain    -  1    Benign essential hypertension          Care Instructions      Thank you for choosing [facility] [place].   I have office hours 8:00 am to 4:30 pm on Monday's, Wednesday's, Thursday's and Friday's. My nurse and I are out of the office every Tuesday.    Following your visit, when your labs and diagnostic testing have returned, I will review then and you will be contacted by my nurse.  If you are on My Chart, you can also view results there.    For refills, notify your pharmacy regarding what you need and the pharmacy will generate a refill request. Do not call my nurse as she is unable to process refill request. Please plan ahead and allow 3-5 days for refill requests.    You will generally receive a reminder call the day prior to your appointment.  If you cannot attend your appointment, please cancel your appointment with as much notice as possible.  If there is a pattern of failure to present for your appointments, I cannot provide consistent, meaningful, ongoing care for you. It is very important to me that you come in for your care, so we can best assist you with your health care needs.    IMPORTANT:  Please note that it is my standard of practice to NOT participate in prescribing ongoing requested Narcotic Analgesic therapy, and/or participate in the prescribing of other controlled substances.  My nurse and I am happy to assist you with the process of referral for alternative pain management as needed, and other treatment modalities including but not limited to:  Physical Therapy, Physical Medicine and Rehab, Counseling, Chiropractic Care, Orthopedic Care, and non-narcotic medication  "management.     In the event that you need to be seen for emergent concerns and I am out of office,  please see one of my colleagues for acute concerns.  You may also present to UC or ER.  I appreciate the opportunity to serve you and look forward to supporting your healthcare needs in the future. Please contact me with any questions or concerns that you may have.    Sincerely,      Linn Ortiz RN, PA-C                 Follow-ups after your visit        Your next 10 appointments already scheduled     Aug 06, 2018 10:00 AM CDT   (Arrive by 9:45 AM)   Return Visit with Laura Mendoza RN   [facility] ([facility] )    [address] [place] [id number] [phone]              Who to contact     If you have questions or need follow up information about today's clinic visit or your schedule please contact [facility] directly at [phone].  Normal or non-critical lab and imaging results will be communicated to you by MyChart, letter or phone within 4 business days after the clinic has received the results. If you do not hear from us within 7 days, please contact the clinic through MyChart or phone. If you have a critical or abnormal lab result, we will notify you by phone as soon as possible.  Submit refill requests through [employer] ([employer]) or call your pharmacy and they will forward the refill request to us. Please allow 3 business days for your refill to be completed.          Additional Information About Your Visit        Michelle K[employer] Information     [employer] ([employer]) lets you send messages to your doctor, view your test results, renew your prescriptions, schedule appointments and more. To sign up, go to [email or web address] . Click on \"Log in\" on the left side of the screen, which will take you to the Welcome page. Then click on \"Sign up Now\" on the right side of the page.     You will be asked to enter the access code listed below, as well as some personal information. Please follow the " "directions to create your username and password.     Your access code is: [email or web address]  Expires: 2018  2:04 PM     Your access code will  in 90 days. If you need help or a new code, please call your [place] clinic or [phone].        Care EveryWhere ID     This is your Care EveryWhere ID. This could be used by other organizations to access your [place] medical records  [id number]        Your Vitals Were     Pulse Temperature Height Pulse Oximetry Breastfeeding? BMI (Body Mass Index)    70 97.9  F (36.6  C) 5' 1\" (1.549 m) 98% No 35.56 kg/m2       Blood Pressure from Last 3 Encounters:   18 152/90   17 159/84   10/24/17 140/89    Weight from Last 3 Encounters:   18 188 lb 3.2 oz (85.4 kg)   17 196 lb 6.4 oz (89.1 kg)   10/24/17 200 lb 14.4 oz (91.1 kg)              We Performed the Following     Beta strep group A culture     Rapid strep screen          Today's Medication Changes          These changes are accurate as of 3/23/18  2:05 PM.  If you have any questions, ask your nurse or doctor.               Start taking these medicines.        Dose/Directions    lisinopril 5 MG tablet   Commonly known as:  PRINIVIL/ZESTRIL   Used for:  Benign essential hypertension   Started by:  Linn Ortiz PA        Dose:  5 mg   Take 1 tablet (5 mg) by mouth daily   Quantity:  30 tablet   Refills:  1            Where to get your medicines      These medications were sent to [facility] [id number]  [place] - [address] AT [facility] [address]  [address], [place] [address]     Phone:  [phone]     lisinopril 5 MG tablet                Primary Care Provider Office Phone # Fax #    Gonzalo Pino [phone] [address] [facility]  [place] [address]        Equal Access to Services     [facility] HAFSA AH: Hadii christiano Weaver, ritika lumaricruz, qaybta kaalmategan wong. So [facility] " [phone].    ATENCIÓN: Si aubrie bowers, tiene a de la cruz disposición servicios gratuitos de asistencia lingüística. Gracia elkins [phone].    We comply with applicable federal civil rights laws and [place] laws. We do not discriminate on the basis of race, color, national origin, age, disability, sex, sexual orientation, or gender identity.            Thank you!     Thank you for choosing [facility]  [facility] your [facility]. Our goal is always to provide you with excellent care. Hearing back from our patients is one way we can continue to improve our services. Please take a few minutes to complete the written survey that you may receive in the mail after your visit with us. Thank you!             Your Updated Medication List - Protect others around you: Learn how to safely use, store and throw away your medicines at www.disposemymeds.org.          This list is accurate as of 3/23/18  2:05 PM.  Always use your most recent med list.                   Brand Name Dispense Instructions for use Diagnosis    * albuterol 108 (90 BASE) MCG/ACT Inhaler    PROAIR HFA    3 Inhaler    Inhale 2 puffs into the lungs every 6 hours    Asthma       * albuterol (2.5 MG/3ML) 0.083% neb solution     90 mL    USE 1 VIAL BY NEBULIZATION EVERY 6 HOURS AS NEEDED FOR SHORTNESS OF BREATH / DYSPNEA OR WHEEZING    Asthma exacerbation       amLODIPine 10 MG tablet    NORVASC    90 tablet    TAKE 1 TABLET BY MOUTH EVERY DAY    Essential hypertension       blood glucose monitoring test strip    RACHAEL CONTOUR NEXT    100 each    Use to test blood sugar 2 times daily.    Type 2 diabetes mellitus with hyperglycemia, without long-term current use of insulin (H)       fluticasone 50 MCG/ACT spray    FLONASE    16 g    INHALE 2 SPRAYS INTO BOTH NOSTRILS DAILY    Allergic rhinitis due to pollen       lisinopril 5 MG tablet    PRINIVIL/ZESTRIL    30 tablet    Take 1 tablet (5 mg) by mouth daily    Benign essential hypertension       metFORMIN 500 MG  tablet    GLUCOPHAGE    120 tablet    TAKE 2 TABLETS BY MOUTH TWICE DAILY WITH MEALS    Type 2 diabetes mellitus without complication, without long-term current use of insulin (H)       metoprolol succinate 100 MG 24 hr tablet    TOPROL-XL    90 tablet    TAKE 1 TABLET BY MOUTH ONCE DAILY    Essential hypertension       MICROLET LANCETS Misc     100 each    Test 2 times daily    Type 2 diabetes mellitus with hyperglycemia, without long-term current use of insulin (H)       SYNTHROID 200 MCG tablet   Generic drug:  levothyroxine      Take 200 mcg by mouth daily        * Notice:  This list has 2 medication(s) that are the same as other medications prescribed for you. Read the directions carefully, and ask your doctor or other care provider to review them with you.

## 2018-03-24 ASSESSMENT — PATIENT HEALTH QUESTIONNAIRE - PHQ9: SUM OF ALL RESPONSES TO PHQ QUESTIONS 1-9: 1

## 2018-03-24 ASSESSMENT — ANXIETY QUESTIONNAIRES: GAD7 TOTAL SCORE: 0

## 2018-03-25 LAB
BACTERIA SPEC CULT: NORMAL
SPECIMEN SOURCE: NORMAL

## 2018-04-24 ENCOUNTER — OFFICE VISIT (OUTPATIENT)
Dept: FAMILY MEDICINE | Facility: OTHER | Age: 48
End: 2018-04-24
Attending: FAMILY MEDICINE
Payer: COMMERCIAL

## 2018-04-24 VITALS
HEIGHT: 61 IN | BODY MASS INDEX: 35.87 KG/M2 | DIASTOLIC BLOOD PRESSURE: 88 MMHG | RESPIRATION RATE: 20 BRPM | TEMPERATURE: 97.3 F | WEIGHT: 190 LBS | HEART RATE: 77 BPM | SYSTOLIC BLOOD PRESSURE: 150 MMHG | OXYGEN SATURATION: 98 %

## 2018-04-24 DIAGNOSIS — I10 BENIGN ESSENTIAL HYPERTENSION: Primary | ICD-10-CM

## 2018-04-24 DIAGNOSIS — I10 BENIGN ESSENTIAL HYPERTENSION: ICD-10-CM

## 2018-04-24 DIAGNOSIS — E11.9 TYPE 2 DIABETES MELLITUS WITHOUT COMPLICATION, WITHOUT LONG-TERM CURRENT USE OF INSULIN (H): ICD-10-CM

## 2018-04-24 PROCEDURE — 99214 OFFICE O/P EST MOD 30 MIN: CPT | Performed by: FAMILY MEDICINE

## 2018-04-24 PROCEDURE — G0463 HOSPITAL OUTPT CLINIC VISIT: HCPCS

## 2018-04-24 RX ORDER — LISINOPRIL 10 MG/1
10 TABLET ORAL DAILY
Qty: 90 TABLET | Refills: 1 | Status: SHIPPED | OUTPATIENT
Start: 2018-04-24 | End: 2018-10-16

## 2018-04-24 ASSESSMENT — ANXIETY QUESTIONNAIRES
3. WORRYING TOO MUCH ABOUT DIFFERENT THINGS: NOT AT ALL
5. BEING SO RESTLESS THAT IT IS HARD TO SIT STILL: NOT AT ALL
GAD7 TOTAL SCORE: 0
1. FEELING NERVOUS, ANXIOUS, OR ON EDGE: NOT AT ALL
2. NOT BEING ABLE TO STOP OR CONTROL WORRYING: NOT AT ALL
4. TROUBLE RELAXING: NOT AT ALL
7. FEELING AFRAID AS IF SOMETHING AWFUL MIGHT HAPPEN: NOT AT ALL
6. BECOMING EASILY ANNOYED OR IRRITABLE: NOT AT ALL

## 2018-04-24 ASSESSMENT — ASTHMA QUESTIONNAIRES
ACT_TOTALSCORE: 22
QUESTION_3 LAST FOUR WEEKS HOW OFTEN DID YOUR ASTHMA SYMPTOMS (WHEEZING, COUGHING, SHORTNESS OF BREATH, CHEST TIGHTNESS OR PAIN) WAKE YOU UP AT NIGHT OR EARLIER THAN USUAL IN THE MORNING: NOT AT ALL
QUESTION_4 LAST FOUR WEEKS HOW OFTEN HAVE YOU USED YOUR RESCUE INHALER OR NEBULIZER MEDICATION (SUCH AS ALBUTEROL): ONCE A WEEK OR LESS
QUESTION_5 LAST FOUR WEEKS HOW WOULD YOU RATE YOUR ASTHMA CONTROL: WELL CONTROLLED
QUESTION_2 LAST FOUR WEEKS HOW OFTEN HAVE YOU HAD SHORTNESS OF BREATH: ONCE OR TWICE A WEEK
QUESTION_1 LAST FOUR WEEKS HOW MUCH OF THE TIME DID YOUR ASTHMA KEEP YOU FROM GETTING AS MUCH DONE AT WORK, SCHOOL OR AT HOME: NONE OF THE TIME

## 2018-04-24 ASSESSMENT — PAIN SCALES - GENERAL: PAINLEVEL: NO PAIN (0)

## 2018-04-24 NOTE — MR AVS SNAPSHOT
After Visit Summary   4/24/2018    Edith Doty    MRN: [id number]           Patient Information     Date Of Birth          1970        Visit Information        Provider Department      4/24/2018 3:00 PM Gonzalo Pino MD [facility]        Today's Diagnoses     HTN (hypertension)    -  1    Type 2 diabetes mellitus without complication, without long-term current use of insulin (H)        Benign essential hypertension          Care Instructions    Increase lisinopril to 10 mg daily          Follow-ups after your visit        Follow-up notes from your care team     Return in about 3 months (around 7/24/2018) for Follow up visit, Lab testing.      Your next 10 appointments already scheduled     Jun 13, 2018 10:20 AM CDT   (Arrive by 10:05 AM)   SHORT with Gonzalo Pino MD   [facility] ([facility] )    [address] [place] [address] [phone]            Aug 06, 2018 10:00 AM CDT   (Arrive by 9:45 AM)   Return Visit with Laura Mendoza RN   [facility] ([facility] )    [address] [place] [id number] [phone]              Who to contact     If you have questions or need follow up information about today's clinic visit or your schedule please contact [facility] directly at [phone].  Normal or non-critical lab and imaging results will be communicated to you by MyChart, letter or phone within 4 business days after the clinic has received the results. If you do not hear from us within 7 days, please contact the clinic through MyChart or phone. If you have a critical or abnormal lab result, we will notify you by phone as soon as possible.  Submit refill requests through [employer] or call your pharmacy and they will forward the refill request to us. Please allow 3 business days for your refill to be completed.          Additional Information About Your Visit        MyChart Information      "[employer] lets you send messages to your doctor, view your test results, renew your prescriptions, schedule appointments and more. To sign up, go to [email or web address] . Click on \"Log in\" on the left side of the screen, which will take you to the Welcome page. Then click on \"Sign up Now\" on the right side of the page.     You will be asked to enter the access code listed below, as well as some personal information. Please follow the directions to create your username and password.     Your access code is: [email or web address]  Expires: 2018  2:04 PM     Your access code will  in 90 days. If you need help or a new code, please call your [place] clinic or [phone].        Care EveryWhere ID     This is your Care EveryWhere ID. This could be used by other organizations to access your [place] medical records  [id number]        Your Vitals Were     Pulse Temperature Respirations Height Pulse Oximetry BMI (Body Mass Index)    77 97.3  F (36.3  C) (Tympanic) 20 5' 1\" (1.549 m) 98% 35.9 kg/m2       Blood Pressure from Last 3 Encounters:   18 150/88   18 152/90   17 159/84    Weight from Last 3 Encounters:   18 190 lb (86.2 kg)   18 188 lb 3.2 oz (85.4 kg)   17 196 lb 6.4 oz (89.1 kg)              Today, you had the following     No orders found for display         Today's Medication Changes          These changes are accurate as of 18 11:59 PM.  If you have any questions, ask your nurse or doctor.               These medicines have changed or have updated prescriptions.        Dose/Directions    lisinopril 10 MG tablet   Commonly known as:  PRINIVIL/ZESTRIL   This may have changed:    - medication strength  - how much to take   Used for:  Benign essential hypertension   Changed by:  Gonzalo Pino MD        Dose:  10 mg   Take 1 tablet (10 mg) by mouth daily   Quantity:  90 tablet   Refills:  1       metFORMIN 500 MG tablet   Commonly known as:  GLUCOPHAGE   This may " have changed:  See the new instructions.   Used for:  Type 2 diabetes mellitus without complication, without long-term current use of insulin (H)   Changed by:  Gonzalo Pino MD        TAKE 2 TABLETS BY MOUTH TWICE DAILY WITH MEALS   Quantity:  120 tablet   Refills:  1            Where to get your medicines      These medications were sent to [facility] - [place], [place] - [address]  [address], [facility] [address]     Phone:  [phone]     lisinopril 10 MG tablet    metFORMIN 500 MG tablet                Primary Care Provider Office Phone # Fax #    Gonzalo Pino [phone]       [address] [facility] [address]        Equal Access to Services     [facility] : Hadii aad ku hadasho Sokalen, waaxda luqadaha, qaybta kaalmada adeegyada, tegan ordonez . So [facility] [phone].    ATENCIÓN: Si habla español, tiene a de la cruz disposición servicios gratuitos de asistencia lingüística. [facility] [phone].    We comply with applicable federal civil rights laws and [place] laws. We do not discriminate on the basis of race, color, national origin, age, disability, sex, sexual orientation, or gender identity.            Thank you!     Thank you for choosing [facility]  for your care. Our goal is always to provide you with excellent care. Hearing back from our patients is one way we can continue to improve our services. Please take a few minutes to complete the written survey that you may receive in the mail after your visit with us. Thank you!             Your Updated Medication List - Protect others around you: Learn how to safely use, store and throw away your medicines at www.disposemymeds.org.          This list is accurate as of 4/24/18 11:59 PM.  Always use your most recent med list.                   Brand Name Dispense Instructions for use Diagnosis    * albuterol 108 (90 Base) MCG/ACT Inhaler     PROAIR HFA    3 Inhaler    Inhale 2 puffs into the lungs every 6 hours    Asthma       * albuterol (2.5 MG/3ML) 0.083% neb solution     90 mL    USE 1 VIAL BY NEBULIZATION EVERY 6 HOURS AS NEEDED FOR SHORTNESS OF BREATH / DYSPNEA OR WHEEZING    Asthma exacerbation       amLODIPine 10 MG tablet    NORVASC    90 tablet    TAKE 1 TABLET BY MOUTH EVERY DAY    Essential hypertension       blood glucose monitoring test strip    RACHAEL CONTOUR NEXT    100 each    Use to test blood sugar 2 times daily.    Type 2 diabetes mellitus with hyperglycemia, without long-term current use of insulin (H)       fluticasone 50 MCG/ACT spray    FLONASE    16 g    INHALE 2 SPRAYS INTO BOTH NOSTRILS DAILY    Allergic rhinitis due to pollen       lisinopril 10 MG tablet    PRINIVIL/ZESTRIL    90 tablet    Take 1 tablet (10 mg) by mouth daily    Benign essential hypertension       metFORMIN 500 MG tablet    GLUCOPHAGE    120 tablet    TAKE 2 TABLETS BY MOUTH TWICE DAILY WITH MEALS    Type 2 diabetes mellitus without complication, without long-term current use of insulin (H)       metoprolol succinate 100 MG 24 hr tablet    TOPROL-XL    90 tablet    TAKE 1 TABLET BY MOUTH ONCE DAILY    Essential hypertension       MICROLET LANCETS Misc     100 each    Test 2 times daily    Type 2 diabetes mellitus with hyperglycemia, without long-term current use of insulin (H)       SYNTHROID 200 MCG tablet   Generic drug:  levothyroxine      Take 200 mcg by mouth daily        * Notice:  This list has 2 medication(s) that are the same as other medications prescribed for you. Read the directions carefully, and ask your doctor or other care provider to review them with you.

## 2018-04-24 NOTE — PROGRESS NOTES
SUBJECTIVE:   Edith Doty is a 47 year old female who presents to clinic today for the following health issues:    Diabetes Follow-up    Patient is checking blood sugars: once daily.  Results are as follows:         am - 130's    Diabetic concerns: None     Symptoms of hypoglycemia (low blood sugar): shaky, dizzy, weak     Paresthesias (numbness or burning in feet) or sores: No     Date of last diabetic eye exam: prior    BP Readings from Last 2 Encounters:   04/24/18 150/88   03/23/18 152/90     Hemoglobin A1C (%)   Date Value   12/18/2017 6.5 (A)   08/28/2017 8.8 (H)     LDL Cholesterol Calculated (mg/dL)   Date Value   08/15/2017 63     Hypertension Follow-up      Outpatient blood pressures are being checked at home.  Results are mildly elevated.    Low Salt Diet: no added salt      Amount of exercise or physical activity: None    Problems taking medications regularly: No    Medication side effects: none    Diet: diabetic    Problem list and histories reviewed & adjusted, as indicated.  Additional history: as documented    Patient Active Problem List   Diagnosis     Hypothyroidism, acquired     Asthma, mild intermittent     Seasonal allergic rhinitis     HTN (hypertension)     Prediabetes     Nontoxic uninodular goiter     Obesity     Type 2 diabetes mellitus without complication, without long-term current use of insulin (H)     Past Surgical History:   Procedure Laterality Date     CHOLECYSTECTOMY  1996     eye surgery for strabismus at ages 2 & 4         Social History   Substance Use Topics     Smoking status: Never Smoker     Smokeless tobacco: Never Used      Comment: no passive exposure     Alcohol use 0.0 oz/week     0 Standard drinks or equivalent per week      Comment: socially      Family History   Problem Relation Age of Onset     DIABETES Father      Other - See Comments Sister      endometriosis     Allergies Sister      environmental     Other - See Comments Mother      fibromyalgia and  "endometreosis     HEART DISEASE Mother      murmer     Allergies Mother      environmental     Hypertension Brother      Allergies Brother      environmental     Asthma No family hx of      Thyroid Disease No family hx of          Current Outpatient Prescriptions   Medication Sig Dispense Refill     lisinopril (PRINIVIL/ZESTRIL) 10 MG tablet Take 1 tablet (10 mg) by mouth daily 90 tablet 1     metFORMIN (GLUCOPHAGE) 500 MG tablet TAKE 2 TABLETS BY MOUTH TWICE DAILY WITH MEALS 120 tablet 1     albuterol (2.5 MG/3ML) 0.083% nebulizer solution USE 1 VIAL BY NEBULIZATION EVERY 6 HOURS AS NEEDED FOR SHORTNESS OF BREATH / DYSPNEA OR WHEEZING 90 mL 3     albuterol (ALBUTEROL) 108 (90 BASE) MCG/ACT inhaler Inhale 2 puffs into the lungs every 6 hours 3 Inhaler 1     amLODIPine (NORVASC) 10 MG tablet TAKE 1 TABLET BY MOUTH EVERY DAY 90 tablet 0     blood glucose monitoring (RACHAEL CONTOUR NEXT) test strip Use to test blood sugar 2 times daily. 100 each 10     fluticasone (FLONASE) 50 MCG/ACT spray INHALE 2 SPRAYS INTO BOTH NOSTRILS DAILY 16 g 3     levothyroxine (SYNTHROID) 200 MCG tablet Take 200 mcg by mouth daily       metoprolol (TOPROL-XL) 100 MG 24 hr tablet TAKE 1 TABLET BY MOUTH ONCE DAILY 90 tablet 1     MICROLET LANCETS MISC Test 2 times daily 100 each 10     Allergies   Allergen Reactions     Amoxicillin Trihydrate Itching     Augmentin       Clavulanic Acid Potassium Itching     Augmentin       Levaquin [Levofloxacin] Swelling     Penicillins Hives       Reviewed and updated as needed this visit by clinical staff  Tobacco  Allergies  Meds  Problems       Reviewed and updated as needed this visit by Provider         ROS:  Constitutional, HEENT, cardiovascular, pulmonary, gi and gu systems are negative, except as otherwise noted.    OBJECTIVE:     /88 (BP Location: Right arm, Patient Position: Sitting, Cuff Size: Adult Regular)  Pulse 77  Temp 97.3  F (36.3  C) (Tympanic)  Resp 20  Ht 5' 1\" (1.549 m)  " Wt 190 lb (86.2 kg)  SpO2 98%  BMI 35.9 kg/m2  Body mass index is 35.9 kg/(m^2).  Physical Exam   Constitutional: She is oriented to person, place, and time. She appears well-developed and well-nourished. No distress.   Neurological: She is alert and oriented to person, place, and time.   Psychiatric: She has a normal mood and affect.       Other exam not repeated.  Diagnostic Test Results:  none     ASSESSMENT/PLAN:     Type 2 diabetes mellitus without complication, without long-term current use of insulin (H)  Most recent fasting labs are reviewed.  Goal of hemoglobin A1C of less than 7 discussed.  Instructed in the importance of diet, exercise, and good glycemic control in prevention of secondary complications.    Continue same medication regimen. Repeat fasting glucose and hemoglobin A1C in 3 months.    - metFORMIN (GLUCOPHAGE) 500 MG tablet; TAKE 2 TABLETS BY MOUTH TWICE DAILY WITH MEALS    HTN (hypertension)  Goals reviewed.  Continue home BP monitoring. Increase lisinopril to 10 mg daily medication regimen.  Follow up 6 months.   - lisinopril (PRINIVIL/ZESTRIL) 10 MG tablet; Take 1 tablet (10 mg) by mouth daily            Gonzalo Pino MD  [facility]

## 2018-04-24 NOTE — NURSING NOTE
"Chief Complaint   Patient presents with     Hypertension       Initial /88 (BP Location: Right arm, Patient Position: Sitting, Cuff Size: Adult Regular)  Pulse 77  Temp 97.3  F (36.3  C) (Tympanic)  Resp 20  Ht 5' 1\" (1.549 m)  Wt 190 lb (86.2 kg)  SpO2 98%  BMI 35.9 kg/m2 Estimated body mass index is 35.9 kg/(m^2) as calculated from the following:    Height as of this encounter: 5' 1\" (1.549 m).    Weight as of this encounter: 190 lb (86.2 kg).  Medication Reconciliation: complete   Serenity Clinton LPN      "

## 2018-04-25 ASSESSMENT — ASTHMA QUESTIONNAIRES: ACT_TOTALSCORE: 22

## 2018-04-25 ASSESSMENT — ANXIETY QUESTIONNAIRES: GAD7 TOTAL SCORE: 0

## 2018-04-25 ASSESSMENT — PATIENT HEALTH QUESTIONNAIRE - PHQ9: SUM OF ALL RESPONSES TO PHQ QUESTIONS 1-9: 1

## 2018-05-03 DIAGNOSIS — J30.1 ALLERGIC RHINITIS DUE TO POLLEN: ICD-10-CM

## 2018-05-04 RX ORDER — FLUTICASONE PROPIONATE 50 MCG
SPRAY, SUSPENSION (ML) NASAL
Qty: 16 G | Refills: 0 | Status: SHIPPED | OUTPATIENT
Start: 2018-05-04 | End: 2018-07-13

## 2018-05-10 DIAGNOSIS — I10 ESSENTIAL HYPERTENSION: ICD-10-CM

## 2018-05-10 NOTE — TELEPHONE ENCOUNTER
Metoprolol   Last Written Prescription Date:  11/15/2017  Last Fill Quantity: 90,   # refills: 1  Last Office Visit: 4/24/2018  Future Office visit:    Next 5 appointments (look out 90 days)     Jun 13, 2018 10:20 AM CDT   (Arrive by 10:05 AM)   SHORT with Gonzalo Pino MD   [facility] ([facility] )    [address] [place] [id number] [phone]            Aug 06, 2018 10:00 AM CDT   (Arrive by 9:45 AM)   Return Visit with Laura Mendoza RN   [facility] ([facility] )    [address] [place] [id number] [phone]

## 2018-05-11 RX ORDER — METOPROLOL SUCCINATE 100 MG/1
TABLET, EXTENDED RELEASE ORAL
Qty: 90 TABLET | Refills: 1 | Status: SHIPPED | OUTPATIENT
Start: 2018-05-11 | End: 2018-11-22

## 2018-06-10 ENCOUNTER — HOSPITAL ENCOUNTER (EMERGENCY)
Facility: HOSPITAL | Age: 48
Discharge: HOME | End: 2018-06-10
Attending: FAMILY MEDICINE | Admitting: FAMILY MEDICINE
Payer: COMMERCIAL

## 2018-06-10 ENCOUNTER — APPOINTMENT (OUTPATIENT)
Dept: CT IMAGING | Facility: HOSPITAL | Age: 48
End: 2018-06-10
Attending: FAMILY MEDICINE
Payer: COMMERCIAL

## 2018-06-10 VITALS
TEMPERATURE: 98.5 F | DIASTOLIC BLOOD PRESSURE: 109 MMHG | OXYGEN SATURATION: 96 % | SYSTOLIC BLOOD PRESSURE: 190 MMHG | RESPIRATION RATE: 16 BRPM

## 2018-06-10 DIAGNOSIS — R19.00 PELVIC MASS: ICD-10-CM

## 2018-06-10 LAB
ALBUMIN SERPL-MCNC: 4.2 G/DL (ref 3.4–5)
ALBUMIN UR-MCNC: NEGATIVE MG/DL
ALP SERPL-CCNC: 79 U/L (ref 40–150)
ALT SERPL W P-5'-P-CCNC: 72 U/L (ref 0–50)
ANION GAP SERPL CALCULATED.3IONS-SCNC: 10 MMOL/L (ref 3–14)
APPEARANCE UR: CLEAR
AST SERPL W P-5'-P-CCNC: 53 U/L (ref 0–45)
BACTERIA #/AREA URNS HPF: ABNORMAL /HPF
BASOPHILS # BLD AUTO: 0 10E9/L (ref 0–0.2)
BASOPHILS NFR BLD AUTO: 0.4 %
BILIRUB SERPL-MCNC: 1.4 MG/DL (ref 0.2–1.3)
BILIRUB UR QL STRIP: NEGATIVE
BUN SERPL-MCNC: 10 MG/DL (ref 7–30)
CALCIUM SERPL-MCNC: 8.8 MG/DL (ref 8.5–10.1)
CHLORIDE SERPL-SCNC: 105 MMOL/L (ref 94–109)
CO2 SERPL-SCNC: 24 MMOL/L (ref 20–32)
COLOR UR AUTO: ABNORMAL
CREAT SERPL-MCNC: 0.61 MG/DL (ref 0.52–1.04)
CRP SERPL-MCNC: <2.9 MG/L (ref 0–8)
DIFFERENTIAL METHOD BLD: NORMAL
EOSINOPHIL # BLD AUTO: 0.2 10E9/L (ref 0–0.7)
EOSINOPHIL NFR BLD AUTO: 1.9 %
ERYTHROCYTE [DISTWIDTH] IN BLOOD BY AUTOMATED COUNT: 12.7 % (ref 10–15)
GFR SERPL CREATININE-BSD FRML MDRD: >90 ML/MIN/1.7M2
GLUCOSE SERPL-MCNC: 122 MG/DL (ref 70–99)
GLUCOSE UR STRIP-MCNC: NEGATIVE MG/DL
HCT VFR BLD AUTO: 41.2 % (ref 35–47)
HGB BLD-MCNC: 14.3 G/DL (ref 11.7–15.7)
HGB UR QL STRIP: ABNORMAL
IMM GRANULOCYTES # BLD: 0 10E9/L (ref 0–0.4)
IMM GRANULOCYTES NFR BLD: 0.3 %
KETONES UR STRIP-MCNC: 10 MG/DL
LEUKOCYTE ESTERASE UR QL STRIP: NEGATIVE
LYMPHOCYTES # BLD AUTO: 1.9 10E9/L (ref 0.8–5.3)
LYMPHOCYTES NFR BLD AUTO: 19.1 %
MCH RBC QN AUTO: 30.2 PG (ref 26.5–33)
MCHC RBC AUTO-ENTMCNC: 34.7 G/DL (ref 31.5–36.5)
MCV RBC AUTO: 87 FL (ref 78–100)
MONOCYTES # BLD AUTO: 0.7 10E9/L (ref 0–1.3)
MONOCYTES NFR BLD AUTO: 7 %
MUCOUS THREADS #/AREA URNS LPF: PRESENT /LPF
NEUTROPHILS # BLD AUTO: 7.2 10E9/L (ref 1.6–8.3)
NEUTROPHILS NFR BLD AUTO: 71.3 %
NITRATE UR QL: NEGATIVE
NRBC # BLD AUTO: 0 10*3/UL
NRBC BLD AUTO-RTO: 0 /100
PH UR STRIP: 5.5 PH (ref 4.7–8)
PLATELET # BLD AUTO: 309 10E9/L (ref 150–450)
POTASSIUM SERPL-SCNC: 3.5 MMOL/L (ref 3.4–5.3)
PROT SERPL-MCNC: 8.1 G/DL (ref 6.8–8.8)
RBC # BLD AUTO: 4.74 10E12/L (ref 3.8–5.2)
RBC #/AREA URNS AUTO: 19 /HPF (ref 0–2)
SODIUM SERPL-SCNC: 139 MMOL/L (ref 133–144)
SOURCE: ABNORMAL
SP GR UR STRIP: 1.01 (ref 1–1.03)
UROBILINOGEN UR STRIP-MCNC: NORMAL MG/DL (ref 0–2)
WBC # BLD AUTO: 10.1 10E9/L (ref 4–11)
WBC #/AREA URNS AUTO: 1 /HPF (ref 0–5)

## 2018-06-10 PROCEDURE — 85025 COMPLETE CBC W/AUTO DIFF WBC: CPT | Performed by: FAMILY MEDICINE

## 2018-06-10 PROCEDURE — 96375 TX/PRO/DX INJ NEW DRUG ADDON: CPT

## 2018-06-10 PROCEDURE — 99285 EMERGENCY DEPT VISIT HI MDM: CPT | Mod: 25

## 2018-06-10 PROCEDURE — 25000128 H RX IP 250 OP 636: Performed by: FAMILY MEDICINE

## 2018-06-10 PROCEDURE — 25000128 H RX IP 250 OP 636

## 2018-06-10 PROCEDURE — 74176 CT ABD & PELVIS W/O CONTRAST: CPT | Mod: TC

## 2018-06-10 PROCEDURE — 99285 EMERGENCY DEPT VISIT HI MDM: CPT | Performed by: FAMILY MEDICINE

## 2018-06-10 PROCEDURE — 36415 COLL VENOUS BLD VENIPUNCTURE: CPT | Performed by: FAMILY MEDICINE

## 2018-06-10 PROCEDURE — 81001 URINALYSIS AUTO W/SCOPE: CPT | Performed by: FAMILY MEDICINE

## 2018-06-10 PROCEDURE — 96376 TX/PRO/DX INJ SAME DRUG ADON: CPT

## 2018-06-10 PROCEDURE — 96374 THER/PROPH/DIAG INJ IV PUSH: CPT

## 2018-06-10 PROCEDURE — 86140 C-REACTIVE PROTEIN: CPT | Performed by: FAMILY MEDICINE

## 2018-06-10 PROCEDURE — 80053 COMPREHEN METABOLIC PANEL: CPT | Performed by: FAMILY MEDICINE

## 2018-06-10 RX ORDER — SODIUM CHLORIDE, SODIUM LACTATE, POTASSIUM CHLORIDE, CALCIUM CHLORIDE 600; 310; 30; 20 MG/100ML; MG/100ML; MG/100ML; MG/100ML
1000 INJECTION, SOLUTION INTRAVENOUS CONTINUOUS
Status: DISCONTINUED | OUTPATIENT
Start: 2018-06-10 | End: 2018-06-10

## 2018-06-10 RX ORDER — MORPHINE SULFATE 2 MG/ML
2 INJECTION, SOLUTION INTRAMUSCULAR; INTRAVENOUS ONCE
Status: COMPLETED | OUTPATIENT
Start: 2018-06-10 | End: 2018-06-10

## 2018-06-10 RX ORDER — MORPHINE SULFATE 2 MG/ML
INJECTION, SOLUTION INTRAMUSCULAR; INTRAVENOUS
Status: COMPLETED
Start: 2018-06-10 | End: 2018-06-10

## 2018-06-10 RX ORDER — ONDANSETRON 2 MG/ML
4 INJECTION INTRAMUSCULAR; INTRAVENOUS ONCE
Status: COMPLETED | OUTPATIENT
Start: 2018-06-10 | End: 2018-06-10

## 2018-06-10 RX ORDER — OXYCODONE HYDROCHLORIDE 10 MG/1
10 TABLET ORAL EVERY 6 HOURS PRN
Qty: 12 TABLET | Refills: 0 | Status: SHIPPED | OUTPATIENT
Start: 2018-06-10 | End: 2018-08-07

## 2018-06-10 RX ORDER — ONDANSETRON 4 MG/1
4 TABLET, ORALLY DISINTEGRATING ORAL EVERY 6 HOURS PRN
Qty: 10 TABLET | Refills: 0 | Status: SHIPPED | OUTPATIENT
Start: 2018-06-10 | End: 2018-06-13

## 2018-06-10 RX ORDER — ONDANSETRON 2 MG/ML
INJECTION INTRAMUSCULAR; INTRAVENOUS
Status: COMPLETED
Start: 2018-06-10 | End: 2018-06-10

## 2018-06-10 RX ADMIN — MORPHINE SULFATE 2 MG: 2 INJECTION, SOLUTION INTRAMUSCULAR; INTRAVENOUS at 16:25

## 2018-06-10 RX ADMIN — MORPHINE SULFATE 2 MG: 2 INJECTION, SOLUTION INTRAMUSCULAR; INTRAVENOUS at 15:29

## 2018-06-10 RX ADMIN — ONDANSETRON 4 MG: 2 INJECTION INTRAMUSCULAR; INTRAVENOUS at 15:26

## 2018-06-10 RX ADMIN — SODIUM CHLORIDE 1000 ML: 9 INJECTION, SOLUTION INTRAVENOUS at 15:24

## 2018-06-10 NOTE — ED NOTES
Pt to ED with SO. Pt reports right sided flank pain that started on Wednesday and has progressively gotten worse. Reports kidney stone about 10 years ago. Denies any injury to the area or fever.  Pt tried tylenol and ibuprofen and hasn't helped.  Denies dysuria. Pt reports its constant achy pains radiates to right side of abdomen

## 2018-06-10 NOTE — ED NOTES
D/c instructions reviewed with patient.  Disk given from CT and packet given.  Patient reports pain is 4/10 and tolerable.  Rx given for Oxy and will fill at pharmacy of choice. Zofran has been e-scribed to pharmacy of choice.  Encouraged to return with new or worsening symptoms.

## 2018-06-10 NOTE — ED AVS SNAPSHOT
[facility]    [address] [place] [address]    Phone:  [phone]                                       Edith Doty   MRN: [id number]    Department:  [facility]   Date of Visit:  6/10/2018           Patient Information     Date Of Birth          1970        Your diagnoses for this visit were:     Pelvic mass        You were seen by Марина Langley MD.      Follow-up Information     Follow up with Linn Palma DO.    Specialty:  OB/Gyn    Why:  Follow up ED visit.  Office should call you by noon tomorrow to make appointment.      Contact information:    [address] [id number] [phone]          Discharge Instructions       Take oxycodone and Zofran as needed for pain and nausea.  Take oxycodone with food.  You can use warm packs or ice to help with the pain as needed.   Avoid heavy lifting over 10# for the next few days if you continue to have pain as it may make the pain worse.    Your next 10 appointments already scheduled     Jun 13, 2018 10:20 AM CDT   (Arrive by 10:05 AM)   SHORT with Gonzalo Pino MD   [facility] ([facility] )    [address] [place] [address] [phone]            Aug 06, 2018 10:00 AM CDT   (Arrive by 9:45 AM)   Return Visit with Laura Mendoza RN   [facility] ([facility] )    [address] [place] [address] [phone]                 Review of your medicines      START taking        Dose / Directions Last dose taken    ondansetron 4 MG ODT tab   Commonly known as:  ZOFRAN ODT   Dose:  4 mg   Quantity:  10 tablet        Take 1 tablet (4 mg) by mouth every 6 hours as needed for nausea   Refills:  0        oxyCODONE IR 10 MG tablet   Commonly known as:  ROXICODONE   Dose:  10 mg   Quantity:  12 tablet        Take 1 tablet (10 mg) by mouth every 6 hours as needed for pain   Refills:  0          Our records  show that you are taking the medicines listed below. If these are incorrect, please call your family doctor or clinic.        Dose / Directions Last dose taken    * albuterol 108 (90 Base) MCG/ACT Inhaler   Commonly known as:  PROAIR HFA   Dose:  2 puff   Quantity:  3 Inhaler        Inhale 2 puffs into the lungs every 6 hours   Refills:  1        * albuterol (2.5 MG/3ML) 0.083% neb solution   Quantity:  90 mL        USE 1 VIAL BY NEBULIZATION EVERY 6 HOURS AS NEEDED FOR SHORTNESS OF BREATH / DYSPNEA OR WHEEZING   Refills:  3        amLODIPine 10 MG tablet   Commonly known as:  NORVASC   Quantity:  90 tablet        TAKE 1 TABLET BY MOUTH EVERY DAY   Refills:  0        blood glucose monitoring test strip   Commonly known as:  RACHAEL CONTOUR NEXT   Quantity:  100 each        Use to test blood sugar 2 times daily.   Refills:  10        fluticasone 50 MCG/ACT spray   Commonly known as:  FLONASE   Quantity:  16 g        INHALE 2 SPRAYS INTO BOTH NOSTRILS DAILY   Refills:  0        IBUPROFEN PO        Refills:  0        lisinopril 10 MG tablet   Commonly known as:  PRINIVIL/ZESTRIL   Dose:  10 mg   Quantity:  90 tablet        Take 1 tablet (10 mg) by mouth daily   Refills:  1        metFORMIN 500 MG tablet   Commonly known as:  GLUCOPHAGE   Quantity:  120 tablet        TAKE 2 TABLETS BY MOUTH TWICE DAILY WITH MEALS   Refills:  0        metoprolol succinate 100 MG 24 hr tablet   Commonly known as:  TOPROL-XL   Quantity:  90 tablet        TAKE 1 TABLET BY MOUTH ONCE DAILY   Refills:  1        MICROLET LANCETS Misc   Quantity:  100 each        Test 2 times daily   Refills:  10        SYNTHROID 200 MCG tablet   Dose:  200 mcg   Generic drug:  levothyroxine        Take 200 mcg by mouth daily   Refills:  0        * Notice:  This list has 2 medication(s) that are the same as other medications prescribed for you. Read the directions carefully, and ask your doctor or other care provider to review them with you.             Information about OPIOIDS     PRESCRIPTION OPIOIDS: WHAT YOU NEED TO KNOW   You have a prescription for an opioid (narcotic) pain medicine. Opioids can cause addiction. If you have a history of chemical dependency of any type, you are at a higher risk of becoming addicted to opioids. Only take this medicine after all other options have been tried. Take it for as short a time and as few doses as possible.     Do not:    Drive. If you drive while taking these medicines, you could be arrested for driving under the influence (DUI).    Operate heavy machinery    Do any other dangerous activities while taking these medicines.     Drink any alcohol while taking these medicines.      Take with any other medicines that contain acetaminophen. Read all labels carefully. Look for the word  acetaminophen  or  Tylenol.  Ask your pharmacist if you have questions or are unsure.    Store your pills in a secure place, locked if possible. We will not replace any lost or stolen medicine. If you don t finish your medicine, please throw away (dispose) as directed by your pharmacist. The [place] Pollution [employer] has more information about safe disposal: https://www.pca.[facility].mn.us/living-green/managing-unwanted-medications    All opioids tend to cause constipation. Drink plenty of water and eat foods that have a lot of fiber, such as fruits, vegetables, prune juice, apple juice and high-fiber cereal. Take a laxative (Miralax, milk of magnesia, Colace, Senna) if you don t move your bowels at least every other day.         Prescriptions were sent or printed at these locations (2 Prescriptions)                   [facility] [address] [place] [address] [address] AT [facility] [address] [facility] [address]    Telephone:  [phone]   Fax:  [phone]   Hours:                  E-Prescribed (1 of 2)         ondansetron (ZOFRAN ODT) 4 MG ODT tab                 Printed at Department/Unit printer (1 of  "2)         oxyCODONE IR (ROXICODONE) 10 MG tablet                Procedures and tests performed during your visit     CBC with platelets differential    CRP inflammation    CT Abdomen Pelvis without Contrast (stone protocol)    Comprehensive metabolic panel    UA reflex to Microscopic and Culture      Orders Needing Specimen Collection     None      Pending Results     No orders found from 2018 to 2018.            Pending Culture Results     No orders found from 2018 to 2018.            Thank you for choosing [facility]       Thank you for choosing [facility] for your care. Our goal is always to provide you with excellent care. Hearing back from our patients is one way we can continue to improve our services. Please take a few minutes to complete the written survey that you may receive in the mail after you visit with us. Thank you!        [employer] Information     [employer] lets you send messages to your doctor, view your test results, renew your prescriptions, schedule appointments and more. To sign up, go to [email or web address] . Click on \"Log in\" on the left side of the screen, which will take you to the Welcome page. Then click on \"Sign up Now\" on the right side of the page.     You will be asked to enter the access code listed below, as well as some personal information. Please follow the directions to create your username and password.     Your access code is: [email or web address]  Expires: 2018  2:04 PM     Your access code will  in 90 days. If you need help or a new code, please call your [facility] clinic or [phone].        Care EveryWhere ID     This is your Care EveryWhere ID. This could be used by other organizations to access your [facility] medical records  [id number]        Equal Access to Services     [facility] : Michael Weaver, ritika cody, tegan vivar. So [facility] [phone].    ATENCIÓN: Si habla " español, tiene a de la cruz disposición servicios gratuitos de asistencia lingüística. Gracia al [phone].    We comply with applicable federal civil rights laws and [place] laws. We do not discriminate on the basis of race, color, national origin, age, disability, sex, sexual orientation, or gender identity.            After Visit Summary       This is your record. Keep this with you and show to your community pharmacist(s) and doctor(s) at your next visit.

## 2018-06-10 NOTE — DISCHARGE INSTRUCTIONS
Take oxycodone and Zofran as needed for pain and nausea.  Take oxycodone with food.  You can use warm packs or ice to help with the pain as needed.   Avoid heavy lifting over 10# for the next few days if you continue to have pain as it may make the pain worse.

## 2018-06-10 NOTE — ED AVS SNAPSHOT
[facility]    [address] [address]    Phone:  [phone]                                       Edith Doty   MRN: [id number]    Department:  [facility]   Date of Visit:  6/10/2018           After Visit Summary Signature Page     I have received my discharge instructions, and my questions have been answered. I have discussed any challenges I see with this plan with the nurse or doctor.    ..........................................................................................................................................  Patient/Patient Representative Signature      ..........................................................................................................................................  Patient Representative Print Name and Relationship to Patient    ..................................................               ................................................  Date                                            Time    ..........................................................................................................................................  Reviewed by Signature/Title    ...................................................              ..............................................  Date                                                            Time

## 2018-06-10 NOTE — ED PROVIDER NOTES
History     Chief Complaint   Patient presents with     Flank Pain     c/o rt flank pain since thursday     HPI  Edith Doty is a 47 year old female who complains of right flank pain that has been occurring since Thursday.  She states that the pain has been in her back as well as in her abdomen with prominent pain is actually lateral.  She is concerned about a kidney stone, does not feel like she has an infection.  She has had no signs of UTI.  She and her  and noticed a firm prominence in her pelvic area.  She just began her menses today.  Rates her pain 7-8 of 10 at rest.    Problem List:    Patient Active Problem List    Diagnosis Date Noted     Type 2 diabetes mellitus without complication, without long-term current use of insulin (H) 08/30/2017     Priority: Medium     Obesity 03/02/2014     Priority: Medium     Seasonal allergic rhinitis 04/25/2011     Priority: Medium     HTN (hypertension) 04/25/2011     Priority: Medium     Prediabetes 04/25/2011     Priority: Medium     Hypothyroidism, acquired 01/01/2011     Priority: Medium     Asthma, mild intermittent 01/01/2011     Priority: Medium     Nontoxic uninodular goiter 11/28/2007     Priority: Medium        Past Medical History:    Past Medical History:   Diagnosis Date     Allergic Rhinitis, Seasonal 04/25/2011     Asthma 01/01/2011     Hypertension, Benign 04/25/2011     Hypothyroidism 01/01/2011     Nontoxic uninodular goiter 11/28/2007     Prediabetes 04/25/2011       Past Surgical History:    Past Surgical History:   Procedure Laterality Date     CHOLECYSTECTOMY  1996     eye surgery for strabismus at ages 2 & 4         Family History:    Family History   Problem Relation Age of Onset     DIABETES Father      Other - See Comments Sister      endometriosis     Allergies Sister      environmental     Other - See Comments Mother      fibromyalgia and endometreosis     HEART DISEASE Mother      murmer     Allergies Mother      environmental      Hypertension Brother      Allergies Brother      environmental     Asthma No family hx of      Thyroid Disease No family hx of        Social History:  Marital Status:   [2]  Social History   Substance Use Topics     Smoking status: Never Smoker     Smokeless tobacco: Never Used      Comment: no passive exposure     Alcohol use 0.0 oz/week     0 Standard drinks or equivalent per week      Comment: socially         Medications:      amLODIPine (NORVASC) 10 MG tablet   blood glucose monitoring (RACHAEL CONTOUR NEXT) test strip   fluticasone (FLONASE) 50 MCG/ACT spray   IBUPROFEN PO   levothyroxine (SYNTHROID) 200 MCG tablet   lisinopril (PRINIVIL/ZESTRIL) 10 MG tablet   metFORMIN (GLUCOPHAGE) 500 MG tablet   metoprolol succinate (TOPROL-XL) 100 MG 24 hr tablet   MICROLET LANCETS MISC   ondansetron (ZOFRAN ODT) 4 MG ODT tab   oxyCODONE IR (ROXICODONE) 10 MG tablet   albuterol (2.5 MG/3ML) 0.083% nebulizer solution   albuterol (ALBUTEROL) 108 (90 BASE) MCG/ACT inhaler         Review of Systems   Constitutional: Positive for activity change. Negative for diaphoresis, fatigue and fever.   HENT: Negative.    Respiratory: Negative for shortness of breath.    Cardiovascular: Negative for chest pain.   Gastrointestinal: Positive for abdominal pain. Negative for constipation, diarrhea, nausea and vomiting.   Genitourinary: Positive for flank pain and frequency. Negative for dysuria and hematuria.   Musculoskeletal: Positive for back pain. Negative for neck pain.   Skin: Negative.    Neurological: Negative.    Psychiatric/Behavioral: The patient is nervous/anxious.        Physical Exam   BP: (!) 211/193  Heart Rate: 94  Temp: 97.1  F (36.2  C)  Resp: 16  SpO2: 98 %      Physical Exam   Constitutional: She is oriented to person, place, and time. She appears well-developed and well-nourished. No distress.   HENT:   Head: Normocephalic and atraumatic.   Neck: Normal range of motion. Neck supple.   Cardiovascular: Normal rate,  regular rhythm, normal heart sounds and intact distal pulses.    No murmur heard.  Pulmonary/Chest: Effort normal and breath sounds normal. No respiratory distress.   Abdominal: Soft. Bowel sounds are normal. She exhibits distension. There is tenderness. There is no rebound and no guarding.   Musculoskeletal: Normal range of motion.   Neurological: She is alert and oriented to person, place, and time.   Skin: Skin is warm and dry.   Psychiatric: She has a normal mood and affect.   Nursing note and vitals reviewed.      ED Course     ED Course     Procedures    Results for orders placed or performed during the hospital encounter of 06/10/18 (from the past 24 hour(s))   CBC with platelets differential   Result Value Ref Range    WBC 10.1 4.0 - 11.0 10e9/L    RBC Count 4.74 3.8 - 5.2 10e12/L    Hemoglobin 14.3 11.7 - 15.7 g/dL    Hematocrit 41.2 35.0 - 47.0 %    MCV 87 78 - 100 fl    MCH 30.2 26.5 - 33.0 pg    MCHC 34.7 31.5 - 36.5 g/dL    RDW 12.7 10.0 - 15.0 %    Platelet Count 309 150 - 450 10e9/L    Diff Method Automated Method     % Neutrophils 71.3 %    % Lymphocytes 19.1 %    % Monocytes 7.0 %    % Eosinophils 1.9 %    % Basophils 0.4 %    % Immature Granulocytes 0.3 %    Nucleated RBCs 0 0 /100    Absolute Neutrophil 7.2 1.6 - 8.3 10e9/L    Absolute Lymphocytes 1.9 0.8 - 5.3 10e9/L    Absolute Monocytes 0.7 0.0 - 1.3 10e9/L    Absolute Eosinophils 0.2 0.0 - 0.7 10e9/L    Absolute Basophils 0.0 0.0 - 0.2 10e9/L    Abs Immature Granulocytes 0.0 0 - 0.4 10e9/L    Absolute Nucleated RBC 0.0    Comprehensive metabolic panel   Result Value Ref Range    Sodium 139 133 - 144 mmol/L    Potassium 3.5 3.4 - 5.3 mmol/L    Chloride 105 94 - 109 mmol/L    Carbon Dioxide 24 20 - 32 mmol/L    Anion Gap 10 3 - 14 mmol/L    Glucose 122 (H) 70 - 99 mg/dL    Urea Nitrogen 10 7 - 30 mg/dL    Creatinine 0.61 0.52 - 1.04 mg/dL    GFR Estimate >90 >60 mL/min/1.7m2    GFR Estimate If Black >90 >60 mL/min/1.7m2    Calcium 8.8 8.5 - 10.1  mg/dL    Bilirubin Total 1.4 (H) 0.2 - 1.3 mg/dL    Albumin 4.2 3.4 - 5.0 g/dL    Protein Total 8.1 6.8 - 8.8 g/dL    Alkaline Phosphatase 79 40 - 150 U/L    ALT 72 (H) 0 - 50 U/L    AST 53 (H) 0 - 45 U/L   CRP inflammation   Result Value Ref Range    CRP Inflammation <2.9 0.0 - 8.0 mg/L   CT Abdomen Pelvis without Contrast (stone protocol)    Narrative    CT ABDOMEN PELVIS W/O CONTRAST    CLINICAL HISTORY: Female, age 47 years,  Abdominal pain; ;    Comparison:  CT scan abdomen and pelvis 7/11/2008    TECHNIQUE:  CT was performed of the abdomen and pelvis without   contrast. Sagittal, coronal and axial reconstructions were reviewed.     FINDINGS:    Abdomen/Pelvis CT:  Lung Bases:  Ex 0.2 mm noncalcified nodule seen medially in the left  lower lobe. This area was not included on the previous examination.    Esophagus/stomach: Small hiatal hernia unchanged.    Liver:  Normal.    Gallbladder: Surgically absent.    Spleen: Normal.    Pancreas: Normal.    Adrenal Glands: Normal.    Kidneys: Normal.  Ureters: Grossly normal. The distal aspects are not well seen.  Urinary bladder: Grossly normal.    Abdominal Aorta: Normal.  IVC: Normal.    Lymph Nodes: Normal.    Large and Small Bowel: Large volume of stool within the ascending  colon. No acute inflammatory process.  Appendix: Normal.    Pelvic Organs:  There is a 16 cm x 12 cm x 11 cm lobulated mass seen  within the pelvis that appears to arise from the uterus.  Peritoneum: No free air. No distinct evidence of free fluid.  Bony structures: No acute abnormality. Scattered degenerative changes  are seen within the lower lumbar spine and at the SI joints.  Postoperative changes suggest previous right laminotomy at L5.    Other Findings:  Inguinal lymph nodes are normal.      Impression    IMPRESSION:  1.   16 x 12 x 11 cm lobulated pelvic mass appears to arise from the  uterus. Size of this mass and lack of contrast limits evaluation.  There is no evidence of  lymphadenopathy throughout.    2.  6.2 mm noncalcified nodule located medially within the left lower  lobe. This portion of the left lung was not included on the previous  CT from July 2008.    KRISTEN GOMES MD   UA reflex to Microscopic and Culture   Result Value Ref Range    Color Urine Light Yellow     Appearance Urine Clear     Glucose Urine Negative NEG^Negative mg/dL    Bilirubin Urine Negative NEG^Negative    Ketones Urine 10 (A) NEG^Negative mg/dL    Specific Gravity Urine 1.007 1.003 - 1.035    Blood Urine Moderate (A) NEG^Negative    pH Urine 5.5 4.7 - 8.0 pH    Protein Albumin Urine Negative NEG^Negative mg/dL    Urobilinogen mg/dL Normal 0.0 - 2.0 mg/dL    Nitrite Urine Negative NEG^Negative    Leukocyte Esterase Urine Negative NEG^Negative    Source Unspecified Urine     RBC Urine 19 (H) 0 - 2 /HPF    WBC Urine 1 0 - 5 /HPF    Bacteria Urine None (A) NEG^Negative /HPF    Mucous Urine Present (A) NEG^Negative /LPF       Medications   0.9% sodium chloride BOLUS (0 mLs Intravenous Stopped 6/10/18 1550)   morphine (PF) injection 2 mg (2 mg Intravenous Given 6/10/18 1529)   ondansetron (ZOFRAN) injection 4 mg (4 mg Intravenous Given 6/10/18 1526)   morphine (PF) injection 2 mg (2 mg Intravenous Given 6/10/18 1625)       Assessments & Plan (with Medical Decision Making)   Patient has a 16 x 12 x 10 lobulated pelvic mass that appears to arise from the uterus that was seen on CT.  She also has a noncalcified nodule medially in the left lower lobe of her lung.  This is suspicious for neoplasm.  Spoke with Dr. Soares, he stated that the gynecologic oncologist need to be contacted for this patient.  Spoke to [place], Dr. Linn Palma is their Gyn/Onc, she is not on-call this weekend.  She will begin tomorrow morning.  Dr. Murillo put a message and to have the office call the patient tomorrow to arrange for an appointment.  Films sent to [facility].  Plan discussed with patient and her , they are  comfortable with this plan.  Provided oxycodone for pain and ondansetron for nausea for interim use.    I have reviewed the nursing notes.    I have reviewed the findings, diagnosis, plan and need for follow up with the patient.  Discharge Medication List as of 6/10/2018  4:42 PM      START taking these medications    Details   ondansetron (ZOFRAN ODT) 4 MG ODT tab Take 1 tablet (4 mg) by mouth every 6 hours as needed for nausea, Disp-10 tablet, R-0, E-Prescribe      oxyCODONE IR (ROXICODONE) 10 MG tablet Take 1 tablet (10 mg) by mouth every 6 hours as needed for pain, Disp-12 tablet, R-0, Local Print             Final diagnoses:   Pelvic mass       6/10/2018   [facility]     Марина Langley MD  06/11/18 0599

## 2018-06-11 ASSESSMENT — ENCOUNTER SYMPTOMS
ACTIVITY CHANGE: 1
FEVER: 0
DIAPHORESIS: 0
FLANK PAIN: 1
VOMITING: 0
NERVOUS/ANXIOUS: 1
DIARRHEA: 0
BACK PAIN: 1
ABDOMINAL PAIN: 1
NECK PAIN: 0
HEMATURIA: 0
DYSURIA: 0
SHORTNESS OF BREATH: 0
FATIGUE: 0
NEUROLOGICAL NEGATIVE: 1
FREQUENCY: 1
CONSTIPATION: 0
NAUSEA: 0

## 2018-06-13 ENCOUNTER — TRANSFERRED RECORDS (OUTPATIENT)
Dept: HEALTH INFORMATION MANAGEMENT | Facility: CLINIC | Age: 48
End: 2018-06-13

## 2018-06-14 ENCOUNTER — TRANSFERRED RECORDS (OUTPATIENT)
Dept: HEALTH INFORMATION MANAGEMENT | Facility: CLINIC | Age: 48
End: 2018-06-14

## 2018-06-14 LAB
HPV ABSTRACT: NORMAL
PAP-ABSTRACT: NORMAL

## 2018-06-25 ENCOUNTER — RADIANT APPOINTMENT (OUTPATIENT)
Dept: GENERAL RADIOLOGY | Facility: OTHER | Age: 48
End: 2018-06-25
Attending: NURSE PRACTITIONER
Payer: COMMERCIAL

## 2018-06-25 ENCOUNTER — OFFICE VISIT (OUTPATIENT)
Dept: FAMILY MEDICINE | Facility: OTHER | Age: 48
End: 2018-06-25
Attending: NURSE PRACTITIONER
Payer: COMMERCIAL

## 2018-06-25 VITALS
DIASTOLIC BLOOD PRESSURE: 74 MMHG | OXYGEN SATURATION: 99 % | HEART RATE: 74 BPM | TEMPERATURE: 98.2 F | WEIGHT: 186 LBS | BODY MASS INDEX: 35.14 KG/M2 | SYSTOLIC BLOOD PRESSURE: 122 MMHG

## 2018-06-25 DIAGNOSIS — E11.9 TYPE 2 DIABETES MELLITUS WITHOUT COMPLICATION, WITHOUT LONG-TERM CURRENT USE OF INSULIN (H): ICD-10-CM

## 2018-06-25 DIAGNOSIS — Z01.818 PREOP GENERAL PHYSICAL EXAM: Primary | ICD-10-CM

## 2018-06-25 DIAGNOSIS — E03.9 ACQUIRED HYPOTHYROIDISM: ICD-10-CM

## 2018-06-25 LAB
ANION GAP SERPL CALCULATED.3IONS-SCNC: 9 MMOL/L (ref 3–14)
BASOPHILS # BLD AUTO: 0.1 10E9/L (ref 0–0.2)
BASOPHILS NFR BLD AUTO: 0.8 %
BUN SERPL-MCNC: 10 MG/DL (ref 7–30)
CALCIUM SERPL-MCNC: 9.2 MG/DL (ref 8.5–10.1)
CHLORIDE SERPL-SCNC: 105 MMOL/L (ref 94–109)
CO2 SERPL-SCNC: 27 MMOL/L (ref 20–32)
CREAT SERPL-MCNC: 0.65 MG/DL (ref 0.52–1.04)
DIFFERENTIAL METHOD BLD: NORMAL
EOSINOPHIL # BLD AUTO: 0.2 10E9/L (ref 0–0.7)
EOSINOPHIL NFR BLD AUTO: 2.5 %
ERYTHROCYTE [DISTWIDTH] IN BLOOD BY AUTOMATED COUNT: 13 % (ref 10–15)
EST. AVERAGE GLUCOSE BLD GHB EST-MCNC: 140 MG/DL
GFR SERPL CREATININE-BSD FRML MDRD: >90 ML/MIN/1.7M2
GLUCOSE SERPL-MCNC: 158 MG/DL (ref 70–99)
HBA1C MFR BLD: 6.5 % (ref 0–5.6)
HCT VFR BLD AUTO: 38.4 % (ref 35–47)
HGB BLD-MCNC: 13.1 G/DL (ref 11.7–15.7)
IMM GRANULOCYTES # BLD: 0 10E9/L (ref 0–0.4)
IMM GRANULOCYTES NFR BLD: 0.1 %
LYMPHOCYTES # BLD AUTO: 2.3 10E9/L (ref 0.8–5.3)
LYMPHOCYTES NFR BLD AUTO: 29.9 %
MCH RBC QN AUTO: 29.8 PG (ref 26.5–33)
MCHC RBC AUTO-ENTMCNC: 34.1 G/DL (ref 31.5–36.5)
MCV RBC AUTO: 87 FL (ref 78–100)
MONOCYTES # BLD AUTO: 0.7 10E9/L (ref 0–1.3)
MONOCYTES NFR BLD AUTO: 9.6 %
NEUTROPHILS # BLD AUTO: 4.4 10E9/L (ref 1.6–8.3)
NEUTROPHILS NFR BLD AUTO: 57.1 %
NRBC # BLD AUTO: 0 10*3/UL
NRBC BLD AUTO-RTO: 0 /100
PLATELET # BLD AUTO: 312 10E9/L (ref 150–450)
POTASSIUM SERPL-SCNC: 4 MMOL/L (ref 3.4–5.3)
RBC # BLD AUTO: 4.4 10E12/L (ref 3.8–5.2)
SODIUM SERPL-SCNC: 141 MMOL/L (ref 133–144)
TSH SERPL DL<=0.005 MIU/L-ACNC: 0.14 MU/L (ref 0.4–4)
WBC # BLD AUTO: 7.7 10E9/L (ref 4–11)

## 2018-06-25 PROCEDURE — 99214 OFFICE O/P EST MOD 30 MIN: CPT | Mod: 25 | Performed by: NURSE PRACTITIONER

## 2018-06-25 PROCEDURE — 93005 ELECTROCARDIOGRAM TRACING: CPT

## 2018-06-25 PROCEDURE — 40000788 ZZHCL STATISTIC ESTIMATED AVERAGE GLUCOSE: Mod: ZL | Performed by: NURSE PRACTITIONER

## 2018-06-25 PROCEDURE — 85025 COMPLETE CBC W/AUTO DIFF WBC: CPT | Mod: ZL | Performed by: NURSE PRACTITIONER

## 2018-06-25 PROCEDURE — 83036 HEMOGLOBIN GLYCOSYLATED A1C: CPT | Mod: ZL | Performed by: NURSE PRACTITIONER

## 2018-06-25 PROCEDURE — G0463 HOSPITAL OUTPT CLINIC VISIT: HCPCS

## 2018-06-25 PROCEDURE — 80048 BASIC METABOLIC PNL TOTAL CA: CPT | Mod: ZL | Performed by: NURSE PRACTITIONER

## 2018-06-25 PROCEDURE — 36415 COLL VENOUS BLD VENIPUNCTURE: CPT | Mod: ZL | Performed by: NURSE PRACTITIONER

## 2018-06-25 PROCEDURE — G0463 HOSPITAL OUTPT CLINIC VISIT: HCPCS | Mod: 25

## 2018-06-25 PROCEDURE — 71046 X-RAY EXAM CHEST 2 VIEWS: CPT | Mod: TC

## 2018-06-25 PROCEDURE — 93010 ELECTROCARDIOGRAM REPORT: CPT | Performed by: INTERNAL MEDICINE

## 2018-06-25 PROCEDURE — 84443 ASSAY THYROID STIM HORMONE: CPT | Mod: ZL | Performed by: NURSE PRACTITIONER

## 2018-06-25 RX ORDER — LEVOTHYROXINE SODIUM 175 UG/1
175 TABLET ORAL DAILY
Qty: 30 TABLET | Refills: 1 | Status: SHIPPED | OUTPATIENT
Start: 2018-06-25 | End: 2018-07-26

## 2018-06-25 ASSESSMENT — ANXIETY QUESTIONNAIRES
7. FEELING AFRAID AS IF SOMETHING AWFUL MIGHT HAPPEN: NOT AT ALL
GAD7 TOTAL SCORE: 2
4. TROUBLE RELAXING: SEVERAL DAYS
5. BEING SO RESTLESS THAT IT IS HARD TO SIT STILL: NOT AT ALL
3. WORRYING TOO MUCH ABOUT DIFFERENT THINGS: NOT AT ALL
2. NOT BEING ABLE TO STOP OR CONTROL WORRYING: NOT AT ALL
6. BECOMING EASILY ANNOYED OR IRRITABLE: NOT AT ALL
1. FEELING NERVOUS, ANXIOUS, OR ON EDGE: SEVERAL DAYS

## 2018-06-25 ASSESSMENT — PAIN SCALES - GENERAL: PAINLEVEL: NO PAIN (0)

## 2018-06-25 NOTE — PROGRESS NOTES
[facility] (formerly [facility]) [place]  [address]  [place] [address]  [phone]  Dept: [phone]    PRE-OP EVALUATION:  Today's date: 2018    Edith Doty (: 1970) presents for pre-operative evaluation assessment as requested by Dr. Palma.  She requires evaluation and anesthesia risk assessment prior to undergoing surgery/procedure for treatment of hysterectomy .    Proposed Surgery/ Procedure: Abdominal hysterectomy  Date of Surgery/ Procedure: 18  Time of Surgery/ Procedure: [facility]/Surgical Facility: [facility]  Fax number for surgical facility: unknown  Primary Physician: Gonzalo Pino  Type of Anesthesia Anticipated: to be determined    Patient has a Health Care Directive or Living Will:  NO    1. NO - Do you have a history of heart attack, stroke, stent, bypass or surgery on an artery in the head, neck, heart or legs?  2. NO - Do you ever have any pain or discomfort in your chest?  3. NO - Do you have a history of  Heart Failure?  4. NO - Are you troubled by shortness of breath when: walking on the level, up a slight hill or at night?  5. NO - Do you currently have a cold, bronchitis or other respiratory infection?  6. NO - Do you have a cough, shortness of breath or wheezing?  7. NO - Do you sometimes get pains in the calves of your legs when you walk?  8. YES - Do you or anyone in your family have previous history of blood clots? mother  9. NO - Do you or does anyone in your family have a serious bleeding problem such as prolonged bleeding following surgeries or cuts?  10. NO - Have you ever had problems with anemia or been told to take iron pills?  11. NO - Have you had any abnormal blood loss such as black, tarry or bloody stools, or abnormal vaginal bleeding?  12. NO - Have you ever had a blood transfusion?  13. NO - Have you or any of your relatives ever had problems with anesthesia?  14. NO - Do you have sleep apnea, excessive snoring or daytime drowsiness?  15. NO -  Do you have any prosthetic heart valves?  16. NO - Do you have prosthetic joints?  17. NO - Is there any chance that you may be pregnant?      HPI:     HPI related to upcoming procedure: mass right sided uterus causing back pain       ASTHMA - Patient has a longstanding history of moderate-severe Asthma . Patient has been doing well overall noting NO SYMPTOMS and continues on medication regimen consisting of inhaler as needed without adverse reactions or side effects.                                                                                                                                               .  DIABETES - Patient has a longstanding history of DiabetesType Type II . Patient is being treated with diet, oral agents and exercise and denies significant side effects. Control has been good. Complicating factors include but are not limited to: hypertension.                                                                                                                            .  HYPERTENSION - Patient has longstanding history of HTN , currently denies any symptoms referable to elevated blood pressure. Specifically denies chest pain, palpitations, dyspnea, orthopnea, PND or peripheral edema. Blood pressure readings have been in normal range. Current medication regimen is as listed below. Patient denies any side effects of medication.                                                                                                                                                                                          .  HYPOTHYROIDISM - Patient has a longstanding history of chronic Hypothyroidism. Patient has been doing well, noting no tremor, insomnia, hair loss or changes in skin texture. Continues to take medications as directed, without adverse reactions or side effects. Last TSH   Lab Results   Component Value Date    TSH 0.14 (L) 06/25/2018   .                                                                                                                                                                                                                         .    MEDICAL HISTORY:     Patient Active Problem List    Diagnosis Date Noted     Type 2 diabetes mellitus without complication, without long-term current use of insulin (H) 08/30/2017     Priority: Medium     Obesity 03/02/2014     Priority: Medium     Seasonal allergic rhinitis 04/25/2011     Priority: Medium     HTN (hypertension) 04/25/2011     Priority: Medium     Prediabetes 04/25/2011     Priority: Medium     Hypothyroidism, acquired 01/01/2011     Priority: Medium     Asthma, mild intermittent 01/01/2011     Priority: Medium     Asthma 10/29/2009     Priority: Medium     Overview:   Seasonal. [facility] record.        Hypertension 10/29/2009     Priority: Medium     Overview:   [facility].        Achilles tendon tear 10/28/2009     Priority: Medium     Chronic lymphocytic thyroiditis 01/28/2009     Priority: Medium     Nontoxic uninodular goiter 11/28/2007     Priority: Medium      Past Medical History:   Diagnosis Date     Allergic Rhinitis, Seasonal 04/25/2011     Asthma 01/01/2011     Hypertension, Benign 04/25/2011     Hypothyroidism 01/01/2011     Nontoxic uninodular goiter 11/28/2007     Prediabetes 04/25/2011     Past Surgical History:   Procedure Laterality Date     CHOLECYSTECTOMY  1996     eye surgery for strabismus at ages 2 & 4       Current Outpatient Prescriptions   Medication Sig Dispense Refill     albuterol (2.5 MG/3ML) 0.083% nebulizer solution USE 1 VIAL BY NEBULIZATION EVERY 6 HOURS AS NEEDED FOR SHORTNESS OF BREATH / DYSPNEA OR WHEEZING 90 mL 3     albuterol (ALBUTEROL) 108 (90 BASE) MCG/ACT inhaler Inhale 2 puffs into the lungs every 6 hours 3 Inhaler 1     amLODIPine (NORVASC) 10 MG tablet TAKE 1 TABLET BY MOUTH EVERY DAY 90 tablet 0     blood glucose monitoring (RACHAEL CONTOUR NEXT) test strip Use to test blood sugar 2  times daily. 100 each 10     fluticasone (FLONASE) 50 MCG/ACT spray INHALE 2 SPRAYS INTO BOTH NOSTRILS DAILY 16 g 0     IBUPROFEN PO        levothyroxine (SYNTHROID) 200 MCG tablet Take 200 mcg by mouth daily       lisinopril (PRINIVIL/ZESTRIL) 10 MG tablet Take 1 tablet (10 mg) by mouth daily 90 tablet 1     metFORMIN (GLUCOPHAGE) 500 MG tablet TAKE 2 TABLETS BY MOUTH TWICE DAILY WITH MEALS 120 tablet 0     metoprolol succinate (TOPROL-XL) 100 MG 24 hr tablet TAKE 1 TABLET BY MOUTH ONCE DAILY 90 tablet 1     MICROLET LANCETS MISC Test 2 times daily 100 each 10     oxyCODONE IR (ROXICODONE) 10 MG tablet Take 1 tablet (10 mg) by mouth every 6 hours as needed for pain 12 tablet 0     OTC products: stopped your ibuprofen    Allergies   Allergen Reactions     Amoxicillin Trihydrate Itching     Augmentin       Clavulanic Acid Potassium Itching     Augmentin       Levaquin [Levofloxacin] Swelling     Penicillins Hives      Latex Allergy: NO    Social History   Substance Use Topics     Smoking status: Never Smoker     Smokeless tobacco: Never Used      Comment: no passive exposure     Alcohol use 0.0 oz/week     0 Standard drinks or equivalent per week      Comment: socially      History   Drug Use No       REVIEW OF SYSTEMS:   CONSTITUTIONAL: NEGATIVE for fever, chills, change in weight  INTEGUMENTARY/SKIN: NEGATIVE for worrisome rashes, moles or lesions  EYES: NEGATIVE for vision changes or irritation  ENT/MOUTH: NEGATIVE for ear, mouth and throat problems  RESP: NEGATIVE for significant cough or SOB  BREAST: NEGATIVE for masses, tenderness or discharge  CV: NEGATIVE for chest pain, palpitations or peripheral edema  GI: NEGATIVE for nausea, abdominal pain, heartburn, or change in bowel habits  : NEGATIVE for frequency, dysuria, or hematuria  MUSCULOSKELETAL: NEGATIVE for significant arthralgias or myalgia  NEURO: NEGATIVE for weakness, dizziness or paresthesias  ENDOCRINE: Hx diabetes and Hx thyroid disease  HEME:  NEGATIVE for bleeding problems  PSYCHIATRIC: NEGATIVE for changes in mood or affect    EXAM:   There were no vitals taken for this visit.    GENERAL APPEARANCE: healthy, alert and no distress     EYES: EOMI, PERRL     HENT: ear canals and TM's normal and nose and mouth without ulcers or lesions     NECK: no adenopathy, no asymmetry, masses, or scars and thyroid normal to palpation     RESP: lungs clear to auscultation - no rales, rhonchi or wheezes     CV: regular rates and rhythm, normal S1 S2, no S3 or S4 and no murmur, click or rub     ABDOMEN: mild tenderness right side of abdomen     MS: extremities normal- no gross deformities noted, no evidence of inflammation in joints, FROM in all extremities.     SKIN: no suspicious lesions or rashes     NEURO: Normal strength and tone, sensory exam grossly normal, mentation intact and speech normal     PSYCH: mentation appears normal. and affect normal/bright     LYMPHATICS: No cervical adenopathy    DIAGNOSTICS:     EKG: Normal Sinus Rhythm, normal axis, normal intervals, no acute ST/T changes c/w ischemia, no LVH by voltage criteria, Premature Atrial Contractions (PAC) noted  Labs Resulted Today:   Results for orders placed or performed in visit on 06/25/18   XR CHEST 2 VW (Clinic Performed)    Narrative    PROCEDURE:  XR CHEST 2 VW    HISTORY:  ; Preop general physical exam.     COMPARISON:  None.    FINDINGS:   The cardiac silhouette is normal in size. The pulmonary vasculature is  normal.  The lungs are clear. No pleural effusion or pneumothorax.      Impression    IMPRESSION:  No acute cardiopulmonary disease.      GILBERT RUIZ MD   CBC with platelets and differential   Result Value Ref Range    WBC 7.7 4.0 - 11.0 10e9/L    RBC Count 4.40 3.8 - 5.2 10e12/L    Hemoglobin 13.1 11.7 - 15.7 g/dL    Hematocrit 38.4 35.0 - 47.0 %    MCV 87 78 - 100 fl    MCH 29.8 26.5 - 33.0 pg    MCHC 34.1 31.5 - 36.5 g/dL    RDW 13.0 10.0 - 15.0 %    Platelet Count 312 150 - 450  10e9/L    Diff Method Automated Method     % Neutrophils 57.1 %    % Lymphocytes 29.9 %    % Monocytes 9.6 %    % Eosinophils 2.5 %    % Basophils 0.8 %    % Immature Granulocytes 0.1 %    Nucleated RBCs 0 0 /100    Absolute Neutrophil 4.4 1.6 - 8.3 10e9/L    Absolute Lymphocytes 2.3 0.8 - 5.3 10e9/L    Absolute Monocytes 0.7 0.0 - 1.3 10e9/L    Absolute Eosinophils 0.2 0.0 - 0.7 10e9/L    Absolute Basophils 0.1 0.0 - 0.2 10e9/L    Abs Immature Granulocytes 0.0 0 - 0.4 10e9/L    Absolute Nucleated RBC 0.0    Basic metabolic panel  (Ca, Cl, CO2, Creat, Gluc, K, Na, BUN)   Result Value Ref Range    Sodium 141 133 - 144 mmol/L    Potassium 4.0 3.4 - 5.3 mmol/L    Chloride 105 94 - 109 mmol/L    Carbon Dioxide 27 20 - 32 mmol/L    Anion Gap 9 3 - 14 mmol/L    Glucose 158 (H) 70 - 99 mg/dL    Urea Nitrogen 10 7 - 30 mg/dL    Creatinine 0.65 0.52 - 1.04 mg/dL    GFR Estimate >90 >60 mL/min/1.7m2    GFR Estimate If Black >90 >60 mL/min/1.7m2    Calcium 9.2 8.5 - 10.1 mg/dL   Hemoglobin A1c   Result Value Ref Range    Hemoglobin A1C 6.5 (H) 0 - 5.6 %   TSH   Result Value Ref Range    TSH 0.14 (L) 0.40 - 4.00 mU/L       Recent Labs   Lab Test  06/10/18   1442  12/18/17   1445  08/28/17   1030  08/15/17   0840   HGB  14.3   --    --   14.3   PLT  309   --    --   297   NA  139   --    --   134   POTASSIUM  3.5   --    --   4.1   CR  0.61   --    --   0.68   A1C   --   6.5*  8.8*   --         IMPRESSION:   Reason for surgery/procedure: uterus mass causing back pain  Diagnosis/reason for consult: pre-op    The proposed surgical procedure is considered INTERMEDIATE risk.    REVISED CARDIAC RISK INDEX  The patient has the following serious cardiovascular risks for perioperative complications such as (MI, PE, VFib and 3  AV Block):  No serious cardiac risks  INTERPRETATION: 1 risks: Class II (low risk - 0.9% complication rate)    The patient has the following additional risks for perioperative complications:  No identified  additional risks      ICD-10-CM    1. Preop general physical exam Z01.818 CBC with platelets and differential     Basic metabolic panel  (Ca, Cl, CO2, Creat, Gluc, K, Na, BUN)     Hemoglobin A1c     TSH     EKG 12-lead complete w/read - (Clinic Performed)     XR CHEST 2 VW (Clinic Performed)     XR CHEST 2 VW (Clinic Performed)   2. Type 2 diabetes mellitus without complication, without long-term current use of insulin (H) E11.9 Hemoglobin A1c   3. Hypothyroidism, acquired E03.9 TSH       RECOMMENDATIONS:         --Patient is to take all scheduled medications on the day of surgery EXCEPT for modifications listed below.    Diabetes Medication Use  -----Hold usual oral and non-insulin diabetic meds (e.g. Metformin, Actos, Glipizide) while NPO.       ACE Inhibitor or Angiotensin Receptor Blocker (ARB) Use  Ace inhibitor or Angiotensin Receptor Blocker (ARB) and should HOLD this medication for the 24 hours prior to surgery.      APPROVAL GIVEN to proceed with proposed procedure, without further diagnostic evaluation       Signed Electronically by: COLLEEN Horta CNP    Copy of this evaluation report is provided to requesting physician.    [facility] Preop Guidelines    Revised Cardiac Risk Index0

## 2018-06-25 NOTE — MR AVS SNAPSHOT
After Visit Summary   6/25/2018    Edith Doty    MRN: [id number]           Patient Information     Date Of Birth          1970        Visit Information        Provider Department      6/25/2018 10:00 AM Heather Mtz APRN CNP [facility]        Today's Diagnoses     Preop general physical exam    -  1    Type 2 diabetes mellitus without complication, without long-term current use of insulin (H)        Hypothyroidism, acquired          Care Instructions      Before Your Surgery      Call your surgeon if there is any change in your health. This includes signs of a cold or flu (such as a sore throat, runny nose, cough, rash or fever).    Do not smoke, drink alcohol or take over the counter medicine (unless your surgeon or primary care doctor tells you to) for the 24 hours before and after surgery.    If you take prescribed drugs: Follow your doctor s orders about which medicines to take and which to stop until after surgery.    Eating and drinking prior to surgery: follow the instructions from your surgeon    Take a shower or bath the night before surgery. Use the soap your surgeon gave you to gently clean your skin. If you do not have soap from your surgeon, use your regular soap. Do not shave or scrub the surgery site.  Wear clean pajamas and have clean sheets on your bed.           Follow-ups after your visit        Follow-up notes from your care team     Return if symptoms worsen or fail to improve.      Your next 10 appointments already scheduled     Aug 06, 2018 10:00 AM CDT   (Arrive by 9:45 AM)   Return Visit with Laura Mendoza RN   [facility] ([facility] )    [address] [place] [id number] [phone]              Who to contact     If you have questions or need follow up information about today's clinic visit or your schedule please contact [facility] directly at [phone].  Normal or non-critical lab and  "imaging results will be communicated to you by MyChart, letter or phone within 4 business days after the clinic has received the results. If you do not hear from us within 7 days, please contact the clinic through [employer] or phone. If you have a critical or abnormal lab result, we will notify you by phone as soon as possible.  Submit refill requests through [employer] or call your pharmacy and they will forward the refill request to us. Please allow 3 business days for your refill to be completed.          Additional Information About Your Visit        [email or web address] Information     [employer] lets you send messages to your doctor, view your test results, renew your prescriptions, schedule appointments and more. To sign up, go to [email or web address] . Click on \"Log in\" on the left side of the screen, which will take you to the Welcome page. Then click on \"Sign up Now\" on the right side of the page.     You will be asked to enter the access code listed below, as well as some personal information. Please follow the directions to create your username and password.     Your access code is: [email or web address]  Expires: 2018 10:36 AM     Your access code will  in 90 days. If you need help or a new code, please call your [facility] clinic or [phone].        Care EveryWhere ID     This is your Care EveryWhere ID. This could be used by other organizations to access your [facility] medical records  [id number]        Your Vitals Were     Pulse Temperature Pulse Oximetry BMI (Body Mass Index)          74 98.2  F (36.8  C) (Tympanic) 99% 35.14 kg/m2         Blood Pressure from Last 3 Encounters:   18 122/74   06/10/18 (!) 190/109   18 150/88    Weight from Last 3 Encounters:   18 186 lb (84.4 kg)   18 190 lb (86.2 kg)   18 188 lb 3.2 oz (85.4 kg)              We Performed the Following     Basic metabolic panel  (Ca, Cl, CO2, Creat, Gluc, K, Na, BUN)     CBC with platelets and differential     EKG " 12-lead complete w/read - (Clinic Performed)     Estimated Average Glucose     Hemoglobin A1c     TSH     XR CHEST 2 VW (Clinic Performed)          Today's Medication Changes          These changes are accurate as of 6/25/18 10:36 AM.  If you have any questions, ask your nurse or doctor.               These medicines have changed or have updated prescriptions.        Dose/Directions    levothyroxine 175 MCG tablet   Commonly known as:  SYNTHROID   This may have changed:    - medication strength  - how much to take   Used for:  Acquired hypothyroidism   Changed by:  Heather Mtz APRN CNP        Dose:  175 mcg   Take 1 tablet (175 mcg) by mouth daily   Quantity:  30 tablet   Refills:  1            Where to get your medicines      These medications were sent to [facility] [address]  [address], [facility] [address]     Phone:  [phone]     levothyroxine 175 MCG tablet                Primary Care Provider Office Phone # Fax #    Gonzalo Pino [phone]       [facility] [address]        Equal Access to Services     [facility]: Hadii christiano lord hadasho Sokalen, waaxda luqadaha, qaybta kaalmajaymie adealmita, tegan ordonez . So [facility] [phone].    ATENCIÓN: Si habla español, tiene a de la cruz disposición servicios gratuitos de asistencia lingüística. Gracia al [phone].    We comply with applicable federal civil rights laws and [place] laws. We do not discriminate on the basis of race, color, national origin, age, disability, sex, sexual orientation, or gender identity.            Thank you!     Thank you for choosing [facility]  for your care. Our goal is always to provide you with excellent care. Hearing back from our patients is one way we can continue to improve our services. Please take a few minutes to complete the written survey that you may receive in the mail  after your visit with us. Thank you!             Your Updated Medication List - Protect others around you: Learn how to safely use, store and throw away your medicines at www.disposemymeds.org.          This list is accurate as of 6/25/18 10:36 AM.  Always use your most recent med list.                   Brand Name Dispense Instructions for use Diagnosis    * albuterol 108 (90 Base) MCG/ACT Inhaler    PROAIR HFA    3 Inhaler    Inhale 2 puffs into the lungs every 6 hours    Asthma       * albuterol (2.5 MG/3ML) 0.083% neb solution     90 mL    USE 1 VIAL BY NEBULIZATION EVERY 6 HOURS AS NEEDED FOR SHORTNESS OF BREATH / DYSPNEA OR WHEEZING    Asthma exacerbation       amLODIPine 10 MG tablet    NORVASC    90 tablet    TAKE 1 TABLET BY MOUTH EVERY DAY    Essential hypertension       blood glucose monitoring test strip    RACHAEL CONTOUR NEXT    100 each    Use to test blood sugar 2 times daily.    Type 2 diabetes mellitus with hyperglycemia, without long-term current use of insulin (H)       fluticasone 50 MCG/ACT spray    FLONASE    16 g    INHALE 2 SPRAYS INTO BOTH NOSTRILS DAILY    Allergic rhinitis due to pollen       IBUPROFEN PO           levothyroxine 175 MCG tablet    SYNTHROID    30 tablet    Take 1 tablet (175 mcg) by mouth daily    Acquired hypothyroidism       lisinopril 10 MG tablet    PRINIVIL/ZESTRIL    90 tablet    Take 1 tablet (10 mg) by mouth daily    Benign essential hypertension       metFORMIN 500 MG tablet    GLUCOPHAGE    120 tablet    TAKE 2 TABLETS BY MOUTH TWICE DAILY WITH MEALS    Type 2 diabetes mellitus without complication, without long-term current use of insulin (H)       metoprolol succinate 100 MG 24 hr tablet    TOPROL-XL    90 tablet    TAKE 1 TABLET BY MOUTH ONCE DAILY    Essential hypertension       MICROLET LANCETS Misc     100 each    Test 2 times daily    Type 2 diabetes mellitus with hyperglycemia, without long-term current use of insulin (H)       oxyCODONE IR 10 MG tablet     ROXICODONE    12 tablet    Take 1 tablet (10 mg) by mouth every 6 hours as needed for pain        * Notice:  This list has 2 medication(s) that are the same as other medications prescribed for you. Read the directions carefully, and ask your doctor or other care provider to review them with you.

## 2018-06-25 NOTE — NURSING NOTE
"Chief Complaint   Patient presents with     Pre-Op Exam       Initial /74  Pulse 74  Temp 98.2  F (36.8  C) (Tympanic)  Wt 186 lb (84.4 kg)  SpO2 99%  BMI 35.14 kg/m2 Estimated body mass index is 35.14 kg/(m^2) as calculated from the following:    Height as of 4/24/18: 5' 1\" (1.549 m).    Weight as of this encounter: 186 lb (84.4 kg).  Medication Reconciliation: complete    Akilah Gandhi MA    "

## 2018-06-26 ASSESSMENT — PATIENT HEALTH QUESTIONNAIRE - PHQ9: SUM OF ALL RESPONSES TO PHQ QUESTIONS 1-9: 0

## 2018-06-26 ASSESSMENT — ANXIETY QUESTIONNAIRES: GAD7 TOTAL SCORE: 2

## 2018-07-02 ENCOUNTER — TRANSFERRED RECORDS (OUTPATIENT)
Dept: HEALTH INFORMATION MANAGEMENT | Facility: CLINIC | Age: 48
End: 2018-07-02

## 2018-07-13 DIAGNOSIS — J30.1 ALLERGIC RHINITIS DUE TO POLLEN: ICD-10-CM

## 2018-07-13 RX ORDER — FLUTICASONE PROPIONATE 50 MCG
SPRAY, SUSPENSION (ML) NASAL
Qty: 16 G | Refills: 1 | Status: SHIPPED | OUTPATIENT
Start: 2018-07-13 | End: 2018-10-06

## 2018-07-26 DIAGNOSIS — E03.9 ACQUIRED HYPOTHYROIDISM: ICD-10-CM

## 2018-07-26 NOTE — TELEPHONE ENCOUNTER
Levothyroxine   Last Written Prescription Date: 6/25/18  Last Fill Quantity: 30 # of Refills: 1  Last Office Visit: 6/25/18      Becca Luke LPN

## 2018-07-27 RX ORDER — LEVOTHYROXINE SODIUM 175 UG/1
TABLET ORAL
Qty: 90 TABLET | Refills: 1 | Status: SHIPPED | OUTPATIENT
Start: 2018-07-27 | End: 2018-12-19

## 2018-07-27 NOTE — TELEPHONE ENCOUNTER
Please see note below for Levothyroxine.  Please advise.  Thank you.       Normal TSH on file in past 12 months           Recent Labs   Lab Test  06/25/18   0942   TSH  0.14*

## 2018-08-07 ENCOUNTER — HOSPITAL ENCOUNTER (OUTPATIENT)
Dept: EDUCATION SERVICES | Facility: HOSPITAL | Age: 48
Discharge: HOME | End: 2018-08-07
Attending: NURSE PRACTITIONER | Admitting: FAMILY MEDICINE
Payer: COMMERCIAL

## 2018-08-07 VITALS
HEART RATE: 70 BPM | DIASTOLIC BLOOD PRESSURE: 92 MMHG | WEIGHT: 186.6 LBS | SYSTOLIC BLOOD PRESSURE: 120 MMHG | RESPIRATION RATE: 16 BRPM | BODY MASS INDEX: 35.23 KG/M2 | OXYGEN SATURATION: 98 % | HEIGHT: 61 IN

## 2018-08-07 DIAGNOSIS — E11.9 TYPE 2 DIABETES MELLITUS WITHOUT COMPLICATION, WITHOUT LONG-TERM CURRENT USE OF INSULIN (H): Primary | ICD-10-CM

## 2018-08-07 PROCEDURE — G0108 DIAB MANAGE TRN  PER INDIV: HCPCS

## 2018-08-07 ASSESSMENT — PAIN SCALES - GENERAL: PAINLEVEL: NO PAIN (0)

## 2018-08-07 NOTE — PROGRESS NOTES
"Edith is here for annual review. /92 (BP Location: Left arm, Patient Position: Sitting, Cuff Size: Adult Large)  Pulse 70  Resp 16  Ht 1.549 m (5' 1\")  Wt 84.6 kg (186 lb 9.6 oz)  SpO2 98%  BMI 35.26 kg/m2     Readings range as follows: fastin-149, 189 - average 137    Lab Results   Component Value Date    A1C 6.5 2018    A1C 6.5 2017    A1C 8.8 2017       Current diabetes medications: Metformin 1000 mg bid    Readiness to learn: eager    Barriers to learning: none    Edith actively participated and demonstrated adequate understanding of topics discussed.     Edith's main concern today was that she is finding that her BG readings are a little elevated. She did have surgery in  and is still working on recovery. She continues to have weight restrictions and has not been able to get back to her usual exercise routine. Edith also has not been able to cook/eat foods that she feels that she should. Her  has been doing the shopping/cooking. She gives him a shopping list, but he will bring home his food preferences which are higher carb: pop, pasta, rice, etc. Edith states that as soon as she is able to do more that she will be able to get back on her meal plan and exercise. I discussed with her that I would not change her diabetes medications for now. They are higher at this point due to the stress of her surgery and healing, decreased activity and higher carb foods. I told her to give it some time and to see what her next A1C looks like.     Topics covered: reviewed blood glucose/A1c targets, testing times, problem solving techniques, discussed risks and reduction of complications related to diabetes and co-morbidities, reviewed diabetes check-list, foot care, sick day management, stress & depression risks and management, managing a chronic disease, review medications, and importance of Diabetes self-management.     Plan:   Continue current plan.    Call " Laura if BG or A1C trends up    Follow up: annually and prn    Time spent with patient: 45 minutes

## 2018-08-07 NOTE — NURSING NOTE
"Chief Complaint   Patient presents with     Diabetes     annual       Initial /92 (BP Location: Left arm, Patient Position: Sitting, Cuff Size: Adult Large)  Pulse 70  Resp 16  Ht 1.549 m (5' 1\")  Wt 84.6 kg (186 lb 9.6 oz)  SpO2 98%  BMI 35.26 kg/m2 Estimated body mass index is 35.26 kg/(m^2) as calculated from the following:    Height as of this encounter: 1.549 m (5' 1\").    Weight as of this encounter: 84.6 kg (186 lb 9.6 oz).  Medication Reconciliation: complete    CORRINE ANGELA LPN    "

## 2018-09-06 ENCOUNTER — TRANSFERRED RECORDS (OUTPATIENT)
Dept: HEALTH INFORMATION MANAGEMENT | Facility: CLINIC | Age: 48
End: 2018-09-06

## 2018-11-01 ENCOUNTER — TRANSFERRED RECORDS (OUTPATIENT)
Dept: HEALTH INFORMATION MANAGEMENT | Facility: CLINIC | Age: 48
End: 2018-11-01

## 2018-11-30 DIAGNOSIS — E11.9 TYPE 2 DIABETES MELLITUS WITHOUT COMPLICATION, WITHOUT LONG-TERM CURRENT USE OF INSULIN (H): ICD-10-CM

## 2018-11-30 NOTE — LETTER
December 3, 2018      Edith Bourgeoisk  [address] [facility] [address]        Dear Edith,     APPOINTMENT REMINDER:   Our records indicates that it is time for you to be seen for daibetes follow up and labs.     Your current medication request for metformin will be approved for one refill but you will need to be seen before any additional refills can be approved.  Taking care of your health is important to us, and ongoing visits with your provider are vital to your care.    We look forward to seeing you in the near future.  You may call our office at [phone] to schedule a visit.     Please disregard this notice if you have already made an appointment.        Sincerely,  Gonzalo Pino MD

## 2018-11-30 NOTE — TELEPHONE ENCOUNTER
METFORMIN 500MG TAB        Last Written Prescription Date:  7/6/18  Last Fill Quantity: 120,   # refills: 4  Last Office Visit: 8/1/18  Future Office visit:

## 2018-12-03 NOTE — TELEPHONE ENCOUNTER
Protocol failed due to    Blood pressure less than 140/90 in past 6 months    Patient has documented LDL within the past 12 mos.    Patient has had a Microalbumin in the past 15 mos.     Please advise. Thank you!

## 2018-12-17 NOTE — PROGRESS NOTES
SUBJECTIVE:   Edith Doty is a 47 year old female who presents to clinic today for the following health issues:    Diabetes Follow-up    Patient is checking blood sugars: once daily.  Results are as follows:         am - 120-140    Diabetic concerns: None     Symptoms of hypoglycemia (low blood sugar): dizzy, weak, lethargy     Paresthesias (numbness or burning in feet) or sores: No     Date of last diabetic eye exam: August 2018    BP Readings from Last 2 Encounters:   08/07/18 120/92   06/25/18 122/74     Hemoglobin A1C (%)   Date Value   06/25/2018 6.5 (H)   12/18/2017 6.5 (A)     LDL Cholesterol Calculated (mg/dL)   Date Value   08/15/2017 63       Diabetes Management Resources    Amount of exercise or physical activity: 2-3 days/week for an average of 15-30 minutes    Problems taking medications regularly: No    Medication side effects: none    Diet: diabetic        Hypertension Follow-up      Outpatient blood pressures are being checked at home.  Results are 140-145/85-95.    Low Salt Diet: low salt      Problem list and histories reviewed & adjusted, as indicated.  Additional history: as documented    Patient Active Problem List   Diagnosis     Hypothyroidism, acquired     Asthma, mild intermittent     Seasonal allergic rhinitis     HTN (hypertension)     Prediabetes     Nontoxic uninodular goiter     Obesity     Type 2 diabetes mellitus without complication, without long-term current use of insulin (H)     Achilles tendon tear     Asthma     Chronic lymphocytic thyroiditis     Hypertension     Past Surgical History:   Procedure Laterality Date     CHOLECYSTECTOMY  1996     eye surgery for strabismus at ages 2 & 4         Social History     Tobacco Use     Smoking status: Never Smoker     Smokeless tobacco: Never Used     Tobacco comment: no passive exposure   Substance Use Topics     Alcohol use: Yes     Alcohol/week: 0.0 oz     Comment: socially      Family History   Problem Relation Age of Onset      Diabetes Father      Other - See Comments Sister         endometriosis     Allergies Sister         environmental     Other - See Comments Mother         fibromyalgia and endometreosis     Heart Disease Mother         murmer     Allergies Mother         environmental     Hypertension Brother      Allergies Brother         environmental     Asthma No family hx of      Thyroid Disease No family hx of          Current Outpatient Medications   Medication Sig Dispense Refill     albuterol (2.5 MG/3ML) 0.083% nebulizer solution USE 1 VIAL BY NEBULIZATION EVERY 6 HOURS AS NEEDED FOR SHORTNESS OF BREATH / DYSPNEA OR WHEEZING 90 mL 3     albuterol (ALBUTEROL) 108 (90 BASE) MCG/ACT inhaler Inhale 2 puffs into the lungs every 6 hours 3 Inhaler 1     amLODIPine (NORVASC) 10 MG tablet TAKE 1 TABLET BY MOUTH EVERY DAY 90 tablet 2     blood glucose monitoring (SOFTCLIX) lancets TEST TWICE DAILY 100 each 10     CONTOUR NEXT TEST test strip USE TO TEST TWICE DAILY 50 each 10     fluticasone (FLONASE) 50 MCG/ACT spray INHALE 2 SPRAYS INTO BOTH NOSTRILS DAILY 16 g 2     IBUPROFEN PO        levothyroxine (SYNTHROID/LEVOTHROID) 175 MCG tablet TAKE ONE TABLET BY MOUTH EVERY DAY 90 tablet 1     lisinopril (PRINIVIL/ZESTRIL) 10 MG tablet TAKE 1 TABLET BY MOUTH ONCE DAILY 90 tablet 0     metFORMIN (GLUCOPHAGE) 500 MG tablet TAKE 2 TABLETS BY MOUTH TWICE DAILY WITH MEALS 120 tablet 0     metoprolol succinate (TOPROL-XL) 100 MG 24 hr tablet TAKE 1 TABLET BY MOUTH ONCE DAILY 90 tablet 1     Allergies   Allergen Reactions     Amoxicillin Trihydrate Itching     Augmentin       Clavulanic Acid Potassium Itching     Augmentin       Levaquin [Levofloxacin] Swelling     Penicillins Hives     Recent Labs   Lab Test 06/25/18  0942 06/10/18  1442 12/18/17  1445 08/28/17  1030 08/15/17  0840 09/17/13  1039   A1C 6.5*  --  6.5* 8.8*  --   --    LDL  --   --   --   --  63  --    HDL  --   --   --   --  52  --    TRIG  --   --   --   --  294*  --    ALT   --  72*  --   --  39 48   CR 0.65 0.61  --   --  0.68  --    GFRESTIMATED >90 >90  --   --  >90  --    GFRESTBLACK >90 >90  --   --  >90  --    POTASSIUM 4.0 3.5  --   --  4.1  --    TSH 0.14*  --   --   --   --   --       BP Readings from Last 3 Encounters:   08/07/18 120/92   06/25/18 122/74   06/10/18 (!) 190/109    Wt Readings from Last 3 Encounters:   08/07/18 84.6 kg (186 lb 9.6 oz)   06/25/18 84.4 kg (186 lb)   04/24/18 86.2 kg (190 lb)                    Reviewed and updated as needed this visit by clinical staff       Reviewed and updated as needed this visit by Provider         ROS:  Constitutional, HEENT, cardiovascular, pulmonary, gi and gu systems are negative, except as otherwise noted.    OBJECTIVE:     There were no vitals taken for this visit.  There is no height or weight on file to calculate BMI.  Physical Exam   Constitutional: She is oriented to person, place, and time. She appears well-developed and well-nourished. No distress.   HENT:   Head: Normocephalic.   Right Ear: Tympanic membrane, external ear and ear canal normal.   Left Ear: Tympanic membrane, external ear and ear canal normal.   Nose: Nose normal.   Mouth/Throat: Oropharynx is clear and moist.   Eyes: Conjunctivae are normal.   Neck: Neck supple. No JVD present. No thyromegaly present.   Cardiovascular: Normal rate, regular rhythm, normal heart sounds and intact distal pulses. Exam reveals no gallop and no friction rub.   No murmur heard.  Pulmonary/Chest: Effort normal and breath sounds normal. She has no rales.   Musculoskeletal: She exhibits no edema.   Neurological: She is alert and oriented to person, place, and time.   Skin: Skin is warm and dry.   Psychiatric: She has a normal mood and affect.         Diagnostic Test Results:  Results for orders placed or performed in visit on 06/25/18   XR CHEST 2 VW (Clinic Performed)    Narrative    PROCEDURE:  XR CHEST 2 VW    HISTORY:  ; Preop general physical exam.     COMPARISON:   None.    FINDINGS:   The cardiac silhouette is normal in size. The pulmonary vasculature is  normal.  The lungs are clear. No pleural effusion or pneumothorax.      Impression    IMPRESSION:  No acute cardiopulmonary disease.      GILBERT RUIZ MD   CBC with platelets and differential   Result Value Ref Range    WBC 7.7 4.0 - 11.0 10e9/L    RBC Count 4.40 3.8 - 5.2 10e12/L    Hemoglobin 13.1 11.7 - 15.7 g/dL    Hematocrit 38.4 35.0 - 47.0 %    MCV 87 78 - 100 fl    MCH 29.8 26.5 - 33.0 pg    MCHC 34.1 31.5 - 36.5 g/dL    RDW 13.0 10.0 - 15.0 %    Platelet Count 312 150 - 450 10e9/L    Diff Method Automated Method     % Neutrophils 57.1 %    % Lymphocytes 29.9 %    % Monocytes 9.6 %    % Eosinophils 2.5 %    % Basophils 0.8 %    % Immature Granulocytes 0.1 %    Nucleated RBCs 0 0 /100    Absolute Neutrophil 4.4 1.6 - 8.3 10e9/L    Absolute Lymphocytes 2.3 0.8 - 5.3 10e9/L    Absolute Monocytes 0.7 0.0 - 1.3 10e9/L    Absolute Eosinophils 0.2 0.0 - 0.7 10e9/L    Absolute Basophils 0.1 0.0 - 0.2 10e9/L    Abs Immature Granulocytes 0.0 0 - 0.4 10e9/L    Absolute Nucleated RBC 0.0    Basic metabolic panel  (Ca, Cl, CO2, Creat, Gluc, K, Na, BUN)   Result Value Ref Range    Sodium 141 133 - 144 mmol/L    Potassium 4.0 3.4 - 5.3 mmol/L    Chloride 105 94 - 109 mmol/L    Carbon Dioxide 27 20 - 32 mmol/L    Anion Gap 9 3 - 14 mmol/L    Glucose 158 (H) 70 - 99 mg/dL    Urea Nitrogen 10 7 - 30 mg/dL    Creatinine 0.65 0.52 - 1.04 mg/dL    GFR Estimate >90 >60 mL/min/1.7m2    GFR Estimate If Black >90 >60 mL/min/1.7m2    Calcium 9.2 8.5 - 10.1 mg/dL   Hemoglobin A1c   Result Value Ref Range    Hemoglobin A1C 6.5 (H) 0 - 5.6 %   TSH   Result Value Ref Range    TSH 0.14 (L) 0.40 - 4.00 mU/L   Estimated Average Glucose   Result Value Ref Range    Estimated Average Glucose 140 mg/dL       ASSESSMENT/PLAN:     Type 2 diabetes mellitus without complication, without long-term current use of insulin (H)  Fasting labs are reviewed.   Goal of hemoglobin A1C of less than 7 discussed.  Instructed in the importance of diet, exercise, and good glycemic control in prevention of secondary complications.    Continue same medication regimen. Repeat fasting glucose and hemoglobin A1C in 3 months.    - C FOOT EXAM  NO CHARGE  - metFORMIN (GLUCOPHAGE) 500 MG tablet; TAKE 2 TABLETS BY MOUTH TWICE DAILY WITH MEALS  - Lipid Profile; Future  - Hemoglobin A1c; Future  - Albumin Random Urine Quantitative with Creat Ratio; Future    HTN (hypertension)  Goals reviewed.  Continue home BP monitoring and same medication regimen.  Follow up 6 months.      - amLODIPine (NORVASC) 10 MG tablet; Take 1 tablet (10 mg) by mouth daily  - lisinopril (PRINIVIL/ZESTRIL) 10 MG tablet; Take 1 tablet (10 mg) by mouth 2 times daily  - metoprolol succinate ER (TOPROL-XL) 100 MG 24 hr tablet; Take 1 tablet (100 mg) by mouth daily    Hypothyroidism, acquired  Stable  - levothyroxine (SYNTHROID/LEVOTHROID) 175 MCG tablet; Take 1 tablet (175 mcg) by mouth daily    Gonzalo Pino MD  [facility] [place]

## 2018-12-19 ENCOUNTER — OFFICE VISIT (OUTPATIENT)
Dept: FAMILY MEDICINE | Facility: OTHER | Age: 48
End: 2018-12-19
Attending: FAMILY MEDICINE
Payer: COMMERCIAL

## 2018-12-19 VITALS
HEART RATE: 74 BPM | WEIGHT: 190 LBS | TEMPERATURE: 97.9 F | BODY MASS INDEX: 35.9 KG/M2 | OXYGEN SATURATION: 98 % | SYSTOLIC BLOOD PRESSURE: 154 MMHG | DIASTOLIC BLOOD PRESSURE: 94 MMHG

## 2018-12-19 DIAGNOSIS — E11.9 TYPE 2 DIABETES MELLITUS WITHOUT COMPLICATION, WITHOUT LONG-TERM CURRENT USE OF INSULIN (H): Primary | ICD-10-CM

## 2018-12-19 DIAGNOSIS — I10 BENIGN ESSENTIAL HYPERTENSION: ICD-10-CM

## 2018-12-19 DIAGNOSIS — E03.9 ACQUIRED HYPOTHYROIDISM: ICD-10-CM

## 2018-12-19 PROCEDURE — G0463 HOSPITAL OUTPT CLINIC VISIT: HCPCS

## 2018-12-19 PROCEDURE — 99214 OFFICE O/P EST MOD 30 MIN: CPT | Performed by: FAMILY MEDICINE

## 2018-12-19 RX ORDER — LISINOPRIL 10 MG/1
10 TABLET ORAL 2 TIMES DAILY
Qty: 180 TABLET | Refills: 3 | Status: SHIPPED | OUTPATIENT
Start: 2018-12-19 | End: 2019-07-31

## 2018-12-19 RX ORDER — AMLODIPINE BESYLATE 10 MG/1
10 TABLET ORAL DAILY
Qty: 90 TABLET | Refills: 2 | Status: SHIPPED | OUTPATIENT
Start: 2018-12-19 | End: 2019-07-31

## 2018-12-19 RX ORDER — LEVOTHYROXINE SODIUM 175 UG/1
175 TABLET ORAL DAILY
Qty: 90 TABLET | Refills: 1 | Status: SHIPPED | OUTPATIENT
Start: 2018-12-19 | End: 2019-06-22

## 2018-12-19 RX ORDER — METOPROLOL SUCCINATE 100 MG/1
100 TABLET, EXTENDED RELEASE ORAL DAILY
Qty: 90 TABLET | Refills: 1 | Status: SHIPPED | OUTPATIENT
Start: 2018-12-19 | End: 2019-06-22

## 2018-12-19 ASSESSMENT — ANXIETY QUESTIONNAIRES
4. TROUBLE RELAXING: SEVERAL DAYS
IF YOU CHECKED OFF ANY PROBLEMS ON THIS QUESTIONNAIRE, HOW DIFFICULT HAVE THESE PROBLEMS MADE IT FOR YOU TO DO YOUR WORK, TAKE CARE OF THINGS AT HOME, OR GET ALONG WITH OTHER PEOPLE: NOT DIFFICULT AT ALL
1. FEELING NERVOUS, ANXIOUS, OR ON EDGE: SEVERAL DAYS
2. NOT BEING ABLE TO STOP OR CONTROL WORRYING: SEVERAL DAYS
7. FEELING AFRAID AS IF SOMETHING AWFUL MIGHT HAPPEN: NOT AT ALL
3. WORRYING TOO MUCH ABOUT DIFFERENT THINGS: NOT AT ALL
6. BECOMING EASILY ANNOYED OR IRRITABLE: NOT AT ALL
GAD7 TOTAL SCORE: 3
5. BEING SO RESTLESS THAT IT IS HARD TO SIT STILL: NOT AT ALL

## 2018-12-19 ASSESSMENT — PAIN SCALES - GENERAL: PAINLEVEL: NO PAIN (0)

## 2018-12-19 ASSESSMENT — PATIENT HEALTH QUESTIONNAIRE - PHQ9: SUM OF ALL RESPONSES TO PHQ QUESTIONS 1-9: 3

## 2018-12-19 NOTE — NURSING NOTE
"Chief Complaint   Patient presents with     Diabetes     Hypertension       Initial BP (!) 154/94 (BP Location: Right arm, Patient Position: Chair, Cuff Size: Adult Large)   Pulse 74   Temp 97.9  F (36.6  C) (Tympanic)   Wt 86.2 kg (190 lb)   SpO2 98%   BMI 35.90 kg/m   Estimated body mass index is 35.9 kg/m  as calculated from the following:    Height as of 8/7/18: 1.549 m (5' 1\").    Weight as of this encounter: 86.2 kg (190 lb).  Medication Reconciliation: complete    Delaney Vargas LPN  "

## 2018-12-20 ASSESSMENT — ANXIETY QUESTIONNAIRES: GAD7 TOTAL SCORE: 3

## 2018-12-30 DIAGNOSIS — E11.9 TYPE 2 DIABETES MELLITUS WITHOUT COMPLICATION, WITHOUT LONG-TERM CURRENT USE OF INSULIN (H): ICD-10-CM

## 2018-12-30 NOTE — LETTER
December 31, 2018      Edith Bourgeoisk  [address] [facility] [address]        Dear Edith,     We are concerned about your health care.  We recently provided you with medication refills.  Your provider ordered labs during your last office visit. Orders have been placed in our computer and should be accessible at most [facility] labs. You WILL need to be fasting for this lab. Please complete this as soon as possible. If you have any questions please call us at [phone].    Please complete lab work prior to your next refill request.         Sincerely,  Gonzalo Pino MD

## 2018-12-31 NOTE — TELEPHONE ENCOUNTER
METFORMIN 500MG TAB      Last Written Prescription Date:  12/19/18  Last Fill Quantity: 120,   # refills: 0  Last Office Visit: 12/19/18  Future Office visit:

## 2018-12-31 NOTE — TELEPHONE ENCOUNTER
Biguanide Agents Failed   metFORMIN (GLUCOPHAGE) 500 MG tablet [Pharmacy Med Name: METFORMIN 500MG TAB]   Rerun Protocol (12/31/2018 2:42 PM)      Blood pressure less than 140/90 in past 6 months          BP Readings from Last 3 Encounters:   12/19/18 (!) 154/94   08/07/18 120/92   06/25/18 122/74                Patient has documented LDL within the past 12 mos.          Recent Labs   Lab Test 08/15/17  0840   LDL 63            Patient has had a Microalbumin in the past 15 mos.      No lab results found.         Patient has documented A1c within the specified period of time.      If HgbA1C is 8 or greater, it needs to be on file within the past 3 months.  If less than 8, must be on file within the past 6 months.          Recent Labs   Lab Test 06/25/18  0942   A1C 6.5*

## 2019-02-13 DIAGNOSIS — E11.9 TYPE 2 DIABETES MELLITUS WITHOUT COMPLICATION, WITHOUT LONG-TERM CURRENT USE OF INSULIN (H): ICD-10-CM

## 2019-02-15 DIAGNOSIS — E11.9 TYPE 2 DIABETES MELLITUS WITHOUT COMPLICATION, WITHOUT LONG-TERM CURRENT USE OF INSULIN (H): ICD-10-CM

## 2019-03-31 DIAGNOSIS — E11.9 TYPE 2 DIABETES MELLITUS WITHOUT COMPLICATION, WITHOUT LONG-TERM CURRENT USE OF INSULIN (H): ICD-10-CM

## 2019-03-31 NOTE — LETTER
April 2, 2019      Edith Bourgeoisk  [address] [facility] [address] [place] [id number]        Dear Edith,     We are concerned about your health care.  We recently provided you with medication refills.  Lab tests are required every 6 months in order to continue refilling your Metformin. We cannot continue to refill your medication unless these labs are completed.  Orders have been placed in our computer and should be accessible at most [facility] labs. You WILL need to be fasting for this lab. Please complete this as soon as possible. If you have any questions please call us at [phone].      Sincerely,  Gonzalo Pino MD

## 2019-04-01 ENCOUNTER — OFFICE VISIT (OUTPATIENT)
Dept: FAMILY MEDICINE | Facility: OTHER | Age: 49
End: 2019-04-01
Attending: FAMILY MEDICINE
Payer: COMMERCIAL

## 2019-04-01 VITALS
SYSTOLIC BLOOD PRESSURE: 144 MMHG | DIASTOLIC BLOOD PRESSURE: 82 MMHG | WEIGHT: 175 LBS | OXYGEN SATURATION: 100 % | HEART RATE: 81 BPM | TEMPERATURE: 98.8 F | HEIGHT: 61 IN | BODY MASS INDEX: 33.04 KG/M2

## 2019-04-01 DIAGNOSIS — J01.90 ACUTE SINUSITIS WITH SYMPTOMS > 10 DAYS: Primary | ICD-10-CM

## 2019-04-01 PROCEDURE — G0463 HOSPITAL OUTPT CLINIC VISIT: HCPCS

## 2019-04-01 PROCEDURE — 99213 OFFICE O/P EST LOW 20 MIN: CPT | Performed by: NURSE PRACTITIONER

## 2019-04-01 RX ORDER — CLINDAMYCIN HCL 300 MG
300 CAPSULE ORAL 3 TIMES DAILY
Qty: 30 CAPSULE | Refills: 0 | Status: SHIPPED | OUTPATIENT
Start: 2019-04-01 | End: 2019-05-08

## 2019-04-01 ASSESSMENT — PAIN SCALES - GENERAL: PAINLEVEL: MILD PAIN (3)

## 2019-04-01 ASSESSMENT — MIFFLIN-ST. JEOR: SCORE: 1361.17

## 2019-04-01 NOTE — NURSING NOTE
"Chief Complaint   Patient presents with     Sinus Problem     Infection?       Initial /82 (BP Location: Right arm, Patient Position: Sitting, Cuff Size: Adult Large)   Pulse 81   Temp 98.8  F (37.1  C) (Tympanic)   Ht 1.549 m (5' 1\")   Wt 79.4 kg (175 lb)   SpO2 100%   BMI 33.07 kg/m   Estimated body mass index is 33.07 kg/m  as calculated from the following:    Height as of this encounter: 1.549 m (5' 1\").    Weight as of this encounter: 79.4 kg (175 lb).  Medication Reconciliation: complete    Aurora Cuellar LPN    "

## 2019-04-01 NOTE — PATIENT INSTRUCTIONS
Patient Education     Self-Care for Sinusitis     Drinking plenty of water can help sinuses drain.   Sinusitis can often be managed with self-care. Self-care can keep sinuses moist and make you feel more comfortable. Remember to follow your doctor's instructions closely. This can make a big difference in getting your sinus problem under control.  Drink fluids  Drinking extra fluids helps thin your mucus. This lets it drain from your sinuses more easily. Have a glass of water every hour or two. A humidifier helps in much the same way. Fluids can also offset the drying effects of certain medicines. If you use a humidifier, follow the product maker's instructions on how to use it. Clean it on a regular schedule.  Use saltwater rinses  Rinses help keep your sinuses and nose moist. Mix a teaspoon of salt in 8 ounces of fresh, warm water. Use a bulb syringe to gently squirt the water into your nose a few times a day. You can also buy ready-made saline nasal sprays.  Apply hot or cold packs  Applying heat to the area surrounding your sinuses may make you feel more comfortable. Use a hot water bottle or a hand towel dipped in hot water. Some people also find ice packs effective for relieving pain.  Medicines  Your doctor may prescribe medications to help treat your sinusitis. If you have an infection, antibiotics can help clear it up. If you are prescribed antibiotics, take all pills on schedule until they are gone, even if you feel better. Decongestants help relieve swelling. Use decongestant sprays for short periods only under the direction of your doctor. If you have allergies, your doctor may prescribe medications to help relieve them.   Date Last Reviewed: 10/1/2016    4404-2995 [employer]. [address] [place] [address]. All rights reserved. This information is not intended as a substitute for professional medical care. Always follow your healthcare professional's instructions.

## 2019-04-01 NOTE — PROGRESS NOTES
SUBJECTIVE:   Edith Doty is a 48 year old female who presents to clinic today for the following health issues:    RESPIRATORY SYMPTOMS      Duration: 2 weeks     Description  nasal congestion, sore throat, facial pain/pressure, cough, fever, ear pain left, fatigue/malaise and diarrhea    Severity: moderate    Accompanying signs and symptoms: None    History (predisposing factors):   was in hospital in [place], possible exposure    Precipitating or alleviating factors: None    Therapies tried and outcome:  rest and fluids oral decongestant antihistamine OTC NSAID Nyquil         Problem list and histories reviewed & adjusted, as indicated.  Additional history: as documented    Patient Active Problem List   Diagnosis     Hypothyroidism, acquired     Asthma, mild intermittent     Seasonal allergic rhinitis     HTN (hypertension)     Nontoxic uninodular goiter     Obesity     Type 2 diabetes mellitus without complication, without long-term current use of insulin (H)     Achilles tendon tear     Asthma     Chronic lymphocytic thyroiditis     Hypertension     Past Surgical History:   Procedure Laterality Date     CHOLECYSTECTOMY  1996     eye surgery for strabismus at ages 2 & 4         Social History     Tobacco Use     Smoking status: Never Smoker     Smokeless tobacco: Never Used     Tobacco comment: no passive exposure   Substance Use Topics     Alcohol use: Yes     Alcohol/week: 0.0 oz     Comment: socially      Family History   Problem Relation Age of Onset     Diabetes Father      Other - See Comments Sister         endometriosis     Allergies Sister         environmental     Other - See Comments Mother         fibromyalgia and endometreosis     Heart Disease Mother         murmer     Allergies Mother         environmental     Hypertension Brother      Allergies Brother         environmental     Asthma No family hx of      Thyroid Disease No family hx of          Current Outpatient Medications  "  Medication Sig Dispense Refill     albuterol (2.5 MG/3ML) 0.083% nebulizer solution USE 1 VIAL BY NEBULIZATION EVERY 6 HOURS AS NEEDED FOR SHORTNESS OF BREATH / DYSPNEA OR WHEEZING 90 mL 3     albuterol (ALBUTEROL) 108 (90 BASE) MCG/ACT inhaler Inhale 2 puffs into the lungs every 6 hours 3 Inhaler 1     amLODIPine (NORVASC) 10 MG tablet Take 1 tablet (10 mg) by mouth daily 90 tablet 2     blood glucose monitoring (SOFTCLIX) lancets TEST TWICE DAILY 100 each 10     CONTOUR NEXT TEST test strip USE TO TEST TWICE DAILY 50 each 10     fluticasone (FLONASE) 50 MCG/ACT nasal spray INHALE 2 SPRAYS INTO BOTH NOSTRILS DAILY 16 g 1     IBUPROFEN PO        levothyroxine (SYNTHROID/LEVOTHROID) 175 MCG tablet Take 1 tablet (175 mcg) by mouth daily 90 tablet 1     lisinopril (PRINIVIL/ZESTRIL) 10 MG tablet Take 1 tablet (10 mg) by mouth 2 times daily 180 tablet 3     metFORMIN (GLUCOPHAGE) 500 MG tablet TAKE 2 TABLETS (1000MG) BY MOUTH TWICE A DAY WITH FOOD 120 tablet 0     metoprolol succinate ER (TOPROL-XL) 100 MG 24 hr tablet Take 1 tablet (100 mg) by mouth daily 90 tablet 1     Allergies   Allergen Reactions     Amoxicillin Trihydrate Itching     Augmentin       Clavulanic Acid Potassium Itching     Augmentin       Levaquin [Levofloxacin] Swelling     Penicillins Hives       Reviewed and updated as needed this visit by clinical staff  Tobacco  Allergies  Meds       Reviewed and updated as needed this visit by Provider         ROS:  CONSTITUTIONAL:chills, fatigue and fever low grade  INTEGUMENTARY/SKIN: NEGATIVE for worrisome rashes, moles or lesions  ENT/MOUTH: ears congested, postnasal drainage, sinus pressure and sore throat has cleared  RESP:occasional cough  CV: NEGATIVE for chest pain, palpitations or peripheral edema    OBJECTIVE:     /82 (BP Location: Right arm, Patient Position: Sitting, Cuff Size: Adult Large)   Pulse 81   Temp 98.8  F (37.1  C) (Tympanic)   Ht 1.549 m (5' 1\")   Wt 79.4 kg (175 lb)   " SpO2 100%   BMI 33.07 kg/m    Body mass index is 33.07 kg/m .   GENERAL: alert and no distress  HENT: normal cephalic/atraumatic, ear canals and TM's normal, nose and mouth without ulcers or lesions, oropharynx clear, oral mucous membranes moist and sinuses: maxillary, frontal tenderness on both sides  NECK: no adenopathy, no asymmetry, masses, or scars and thyroid normal to palpation  RESP: lungs clear to auscultation - no rales, rhonchi or wheezes  CV: regular rate and rhythm, normal S1 S2, no S3 or S4, no murmur, click or rub, no peripheral edema and peripheral pulses strong  ABDOMEN: soft, nontender, no hepatosplenomegaly, no masses and bowel sounds normal  SKIN: no suspicious lesions or rashes  PSYCH: mentation appears normal, affect normal/bright    Diagnostic Test Results:  none     ASSESSMENT/PLAN:     1. Acute sinusitis with symptoms > 10 days  With symptoms continuing for about 2 weeks and no improvement, plan to treat with antibiotic.  Discussed continued self sinus care with sinus rinsing follow up with nasal steroid. She is encouraged to stay hydrated and rest. Follow up if no improvement or worsening symptoms.   - clindamycin (CLEOCIN) 300 MG capsule; Take 1 capsule (300 mg) by mouth 3 times daily for 10 days  Dispense: 30 capsule; Refill: 0        See Patient Instructions    COLLEEN Horta [facility] - [place]

## 2019-04-01 NOTE — TELEPHONE ENCOUNTER
Metformin 500 mg      Last Written Prescription Date:  02/15/19  Last Fill Quantity: 120,   # refills: 0  Last Office Visit: 04/01/19  Future Office visit:

## 2019-04-02 NOTE — TELEPHONE ENCOUNTER
Protocol failed due to    Blood pressure less than 140/90 in past 6 months    Patient has documented LDL within the past 12 mos.    Patient has had a Microalbumin in the past 15 mos.    Patient has documented A1c within the specified period of time.     Labs ordered. Letter sent 12/3/18, 12/31/18, and today. Please advise. Thank you!

## 2019-04-08 DIAGNOSIS — E11.9 TYPE 2 DIABETES MELLITUS WITHOUT COMPLICATION, WITHOUT LONG-TERM CURRENT USE OF INSULIN (H): ICD-10-CM

## 2019-04-08 LAB
CHOLEST SERPL-MCNC: 198 MG/DL
CREAT UR-MCNC: 212 MG/DL
HBA1C MFR BLD: 7.1 % (ref 0–5.6)
HDLC SERPL-MCNC: 73 MG/DL
LDLC SERPL CALC-MCNC: 78 MG/DL
MICROALBUMIN UR-MCNC: 64 MG/L
MICROALBUMIN/CREAT UR: 30 MG/G CR (ref 0–25)
NONHDLC SERPL-MCNC: 125 MG/DL
TRIGL SERPL-MCNC: 235 MG/DL

## 2019-04-08 PROCEDURE — 82043 UR ALBUMIN QUANTITATIVE: CPT | Mod: ZL | Performed by: FAMILY MEDICINE

## 2019-04-08 PROCEDURE — 80061 LIPID PANEL: CPT | Mod: ZL | Performed by: FAMILY MEDICINE

## 2019-04-08 PROCEDURE — 36415 COLL VENOUS BLD VENIPUNCTURE: CPT | Mod: ZL | Performed by: FAMILY MEDICINE

## 2019-04-08 PROCEDURE — 83036 HEMOGLOBIN GLYCOSYLATED A1C: CPT | Mod: ZL | Performed by: FAMILY MEDICINE

## 2019-04-16 DIAGNOSIS — E11.9 TYPE 2 DIABETES MELLITUS WITHOUT COMPLICATION, WITHOUT LONG-TERM CURRENT USE OF INSULIN (H): ICD-10-CM

## 2019-04-24 DIAGNOSIS — K30 INDIGESTION: Primary | ICD-10-CM

## 2019-04-24 NOTE — TELEPHONE ENCOUNTER
Omeprazole    Not on current med list  Last Written Prescription Date:    Last Fill Quantity: ,   # refills:   Last Office Visit: 4/1/19  Future Office visit:

## 2019-04-26 DIAGNOSIS — E11.9 TYPE 2 DIABETES MELLITUS WITHOUT COMPLICATION, WITHOUT LONG-TERM CURRENT USE OF INSULIN (H): ICD-10-CM

## 2019-04-26 DIAGNOSIS — K30 INDIGESTION: ICD-10-CM

## 2019-04-26 NOTE — TELEPHONE ENCOUNTER
Metformin 500 mg    Last Written Prescription Date:  4/2/2019  Last Fill Quantity: 120,   # refills: 0  Last Office Visit: 12/19/2018  Future Office visit:       HAD [facility]S DONE 4/8/2019  Needs refill ASAP

## 2019-05-02 ENCOUNTER — TRANSFERRED RECORDS (OUTPATIENT)
Dept: HEALTH INFORMATION MANAGEMENT | Facility: CLINIC | Age: 49
End: 2019-05-02

## 2019-05-06 NOTE — PROGRESS NOTES
SUBJECTIVE:   Edith Doty is a 48 year old female who presents to clinic today for the following   health issues:    Diabetes Follow-up    Patient is checking blood sugars: once daily.  Results are as follows:         am     Diabetic concerns: None     Symptoms of hypoglycemia (low blood sugar): none     Paresthesias (numbness or burning in feet) or sores: No     Date of last diabetic eye exam: unknown    BP Readings from Last 2 Encounters:   05/08/19 148/88   04/01/19 144/82     Hemoglobin A1C (%)   Date Value   04/08/2019 7.1 (H)   06/25/2018 6.5 (H)     LDL Cholesterol Calculated (mg/dL)   Date Value   04/08/2019 78   08/15/2017 63       Diabetes Management Resources  Hypertension Follow-up      Outpatient blood pressures are not being checked.    Low Salt Diet: no added salt    Edith has had significantly elevated blood pressures requiring 3 medications.  She admits to a significant anxiety component and has significant psychosocial stressors.    Hypothyroidism Follow-up      Since last visit, patient describes the following symptoms: Weight stable, no hair loss, no skin changes, no constipation, no loose stools      Amount of exercise or physical activity: 2-3 days/week for an average of 30-45 minutes    Problems taking medications regularly: No    Medication side effects: none    Diet: low salt and diabetic    Additional history: as documented    Reviewed  and updated as needed this visit by clinical staff  Tobacco  Allergies  Meds         Reviewed and updated as needed this visit by Provider         Patient Active Problem List   Diagnosis     Hypothyroidism, acquired     Asthma, mild intermittent     Seasonal allergic rhinitis     HTN (hypertension)     Nontoxic uninodular goiter     Obesity     Type 2 diabetes mellitus without complication, without long-term current use of insulin (H)     Achilles tendon tear     Asthma     Chronic lymphocytic thyroiditis     Hypertension     Past Surgical  History:   Procedure Laterality Date     CHOLECYSTECTOMY  1996     eye surgery for strabismus at ages 2 & 4         Social History     Tobacco Use     Smoking status: Never Smoker     Smokeless tobacco: Never Used     Tobacco comment: no passive exposure   Substance Use Topics     Alcohol use: Yes     Alcohol/week: 0.0 oz     Comment: socially      Family History   Problem Relation Age of Onset     Diabetes Father      Other - See Comments Sister         endometriosis     Allergies Sister         environmental     Other - See Comments Mother         fibromyalgia and endometreosis     Heart Disease Mother         murmer     Allergies Mother         environmental     Hypertension Brother      Allergies Brother         environmental     Asthma No family hx of      Thyroid Disease No family hx of          Current Outpatient Medications   Medication Sig Dispense Refill     albuterol (2.5 MG/3ML) 0.083% nebulizer solution USE 1 VIAL BY NEBULIZATION EVERY 6 HOURS AS NEEDED FOR SHORTNESS OF BREATH / DYSPNEA OR WHEEZING 90 mL 3     albuterol (ALBUTEROL) 108 (90 BASE) MCG/ACT inhaler Inhale 2 puffs into the lungs every 6 hours 3 Inhaler 1     amLODIPine (NORVASC) 10 MG tablet Take 1 tablet (10 mg) by mouth daily 90 tablet 2     blood glucose monitoring (SOFTCLIX) lancets TEST TWICE DAILY 100 each 10     CONTOUR NEXT TEST test strip USE TO TEST TWICE DAILY 50 each 10     fluticasone (FLONASE) 50 MCG/ACT nasal spray INHALE 2 SPRAYS INTO BOTH NOSTRILS DAILY 16 g 1     IBUPROFEN PO        levothyroxine (SYNTHROID/LEVOTHROID) 175 MCG tablet Take 1 tablet (175 mcg) by mouth daily 90 tablet 1     lisinopril (PRINIVIL/ZESTRIL) 10 MG tablet Take 1 tablet (10 mg) by mouth 2 times daily 180 tablet 3     metFORMIN (GLUCOPHAGE) 500 MG tablet Take 2 tablets (1,000 mg) by mouth 2 times daily (with meals) 120 tablet 3     metoprolol succinate ER (TOPROL-XL) 100 MG 24 hr tablet Take 1 tablet (100 mg) by mouth daily 90 tablet 1     Allergies  "  Allergen Reactions     Amoxicillin Trihydrate Itching     Augmentin       Clavulanic Acid Potassium Itching     Augmentin       Levaquin [Levofloxacin] Swelling     Penicillins Hives     Recent Labs   Lab Test 04/08/19  0924 06/25/18  0942 06/10/18  1442 12/18/17  1445  08/15/17  0840 09/17/13  1039   A1C 7.1* 6.5*  --  6.5*   < >  --   --    LDL 78  --   --   --   --  63  --    HDL 73  --   --   --   --  52  --    TRIG 235*  --   --   --   --  294*  --    ALT  --   --  72*  --   --  39 48   CR  --  0.65 0.61  --   --  0.68  --    GFRESTIMATED  --  >90 >90  --   --  >90  --    GFRESTBLACK  --  >90 >90  --   --  >90  --    POTASSIUM  --  4.0 3.5  --   --  4.1  --    TSH  --  0.14*  --   --   --   --   --     < > = values in this interval not displayed.      BP Readings from Last 3 Encounters:   05/08/19 148/88   04/01/19 144/82   12/19/18 (!) 154/94    Wt Readings from Last 3 Encounters:   05/08/19 91.2 kg (201 lb)   04/01/19 79.4 kg (175 lb)   12/19/18 86.2 kg (190 lb)                    ROS:  Constitutional, HEENT, cardiovascular, pulmonary, gi and gu systems are negative, except as otherwise noted.    OBJECTIVE:     /88 (BP Location: Left arm, Patient Position: Sitting, Cuff Size: Adult Large)   Pulse 86   Temp 97.9  F (36.6  C) (Tympanic)   Resp 20   Ht 1.549 m (5' 1\")   Wt 91.2 kg (201 lb)   SpO2 98%   BMI 37.98 kg/m    Body mass index is 37.98 kg/m .  Physical Exam   Constitutional: She is oriented to person, place, and time. She appears well-developed and well-nourished. No distress.   HENT:   Head: Normocephalic.   Right Ear: Tympanic membrane, external ear and ear canal normal.   Left Ear: Tympanic membrane, external ear and ear canal normal.   Nose: Nose normal.   Mouth/Throat: Oropharynx is clear and moist.   Eyes: Conjunctivae are normal.   Neck: Neck supple. No JVD present. No thyromegaly present.   Cardiovascular: Normal rate, regular rhythm, normal heart sounds and intact distal pulses. " Exam reveals no gallop and no friction rub.   No murmur heard.  Pulmonary/Chest: Effort normal and breath sounds normal. She has no rales.   Musculoskeletal: She exhibits no edema.   Neurological: She is alert and oriented to person, place, and time.   Skin: Skin is warm and dry.   Psychiatric: She has a normal mood and affect.         Diagnostic Test Results:  Results for orders placed or performed in visit on 04/08/19   Albumin Random Urine Quantitative with Creat Ratio   Result Value Ref Range    Creatinine Urine 212 mg/dL    Albumin Urine mg/L 64 mg/L    Albumin Urine mg/g Cr 30.00 (H) 0 - 25 mg/g Cr   Hemoglobin A1c   Result Value Ref Range    Hemoglobin A1C 7.1 (H) 0 - 5.6 %   Lipid Profile   Result Value Ref Range    Cholesterol 198 <200 mg/dL    Triglycerides 235 (H) <150 mg/dL    HDL Cholesterol 73 >49 mg/dL    LDL Cholesterol Calculated 78 <100 mg/dL    Non HDL Cholesterol 125 <130 mg/dL       ASSESSMENT/PLAN:     Type 2 diabetes mellitus without complication, without long-term current use of insulin (H)  Fasting labs are reviewed.  Goal of hemoglobin A1C of less than 7 discussed.  Instructed in the importance of diet, exercise, and good glycemic control in prevention of secondary complications.    Continue same medication regimen. Repeat fasting glucose and hemoglobin A1C in 3 months.      HTN (hypertension)  Goals reviewed.  Continue home BP monitoring and same medication regimen.  Follow up 3 months.   - IR Renal Angiogram Bilateral; Future    Because of the resistant nature of her hypertension renal angiogram scheduled to rule out renal artery stenosis    Hypothyroidism, acquired  Continue synthroid current dose    Adjustment disorder with anxious mood  Discussed role of anxiety in hypertension.  Lorazepam (Ativan) as written.  Follow up one month  - LORazepam (ATIVAN) 1 MG tablet; Take 1 tablet (1 mg) by mouth every 6 hours as needed for anxiety    Asthma, mild intermittent  Refilled  - albuterol  (PROAIR HFA) 108 (90 Base) MCG/ACT inhaler; Inhale 2 puffs into the lungs every 6 hours    Seasonal allergic rhinitis due to pollen  Refilled  - fluticasone (FLONASE) 50 MCG/ACT nasal spray; INHALE 2 SPRAYS INTO BOTH NOSTRILS DAILY      Gonzalo Pino MD  [facility]

## 2019-05-08 ENCOUNTER — TELEPHONE (OUTPATIENT)
Dept: FAMILY MEDICINE | Facility: OTHER | Age: 49
End: 2019-05-08

## 2019-05-08 ENCOUNTER — OFFICE VISIT (OUTPATIENT)
Dept: FAMILY MEDICINE | Facility: OTHER | Age: 49
End: 2019-05-08
Attending: FAMILY MEDICINE
Payer: COMMERCIAL

## 2019-05-08 VITALS
HEIGHT: 61 IN | DIASTOLIC BLOOD PRESSURE: 84 MMHG | OXYGEN SATURATION: 98 % | SYSTOLIC BLOOD PRESSURE: 142 MMHG | HEART RATE: 86 BPM | WEIGHT: 201 LBS | RESPIRATION RATE: 20 BRPM | TEMPERATURE: 97.9 F | BODY MASS INDEX: 37.95 KG/M2

## 2019-05-08 DIAGNOSIS — F43.22 ADJUSTMENT DISORDER WITH ANXIOUS MOOD: ICD-10-CM

## 2019-05-08 DIAGNOSIS — E11.9 TYPE 2 DIABETES MELLITUS WITHOUT COMPLICATION, WITHOUT LONG-TERM CURRENT USE OF INSULIN (H): Primary | ICD-10-CM

## 2019-05-08 DIAGNOSIS — E03.9 ACQUIRED HYPOTHYROIDISM: ICD-10-CM

## 2019-05-08 DIAGNOSIS — J30.1 SEASONAL ALLERGIC RHINITIS DUE TO POLLEN: ICD-10-CM

## 2019-05-08 DIAGNOSIS — I10 BENIGN ESSENTIAL HYPERTENSION: ICD-10-CM

## 2019-05-08 DIAGNOSIS — J45.20 MILD INTERMITTENT ASTHMA WITHOUT COMPLICATION: ICD-10-CM

## 2019-05-08 PROCEDURE — G0463 HOSPITAL OUTPT CLINIC VISIT: HCPCS

## 2019-05-08 PROCEDURE — 99214 OFFICE O/P EST MOD 30 MIN: CPT | Performed by: FAMILY MEDICINE

## 2019-05-08 RX ORDER — FLUTICASONE PROPIONATE 50 MCG
SPRAY, SUSPENSION (ML) NASAL
Qty: 16 G | Refills: 1 | Status: SHIPPED | OUTPATIENT
Start: 2019-05-08 | End: 2019-05-22

## 2019-05-08 RX ORDER — ALBUTEROL SULFATE 90 UG/1
2 AEROSOL, METERED RESPIRATORY (INHALATION) EVERY 6 HOURS
Qty: 18 G | Refills: 3 | Status: SHIPPED | OUTPATIENT
Start: 2019-05-08 | End: 2019-12-02

## 2019-05-08 RX ORDER — LORAZEPAM 1 MG/1
1 TABLET ORAL EVERY 6 HOURS PRN
Qty: 20 TABLET | Refills: 0 | Status: SHIPPED | OUTPATIENT
Start: 2019-05-08 | End: 2019-05-16

## 2019-05-08 ASSESSMENT — ASTHMA QUESTIONNAIRES
QUESTION_2 LAST FOUR WEEKS HOW OFTEN HAVE YOU HAD SHORTNESS OF BREATH: NOT AT ALL
QUESTION_1 LAST FOUR WEEKS HOW MUCH OF THE TIME DID YOUR ASTHMA KEEP YOU FROM GETTING AS MUCH DONE AT WORK, SCHOOL OR AT HOME: NONE OF THE TIME
QUESTION_4 LAST FOUR WEEKS HOW OFTEN HAVE YOU USED YOUR RESCUE INHALER OR NEBULIZER MEDICATION (SUCH AS ALBUTEROL): NOT AT ALL
QUESTION_3 LAST FOUR WEEKS HOW OFTEN DID YOUR ASTHMA SYMPTOMS (WHEEZING, COUGHING, SHORTNESS OF BREATH, CHEST TIGHTNESS OR PAIN) WAKE YOU UP AT NIGHT OR EARLIER THAN USUAL IN THE MORNING: NOT AT ALL
ACT_TOTALSCORE: 25
QUESTION_5 LAST FOUR WEEKS HOW WOULD YOU RATE YOUR ASTHMA CONTROL: COMPLETELY CONTROLLED

## 2019-05-08 ASSESSMENT — MIFFLIN-ST. JEOR: SCORE: 1479.11

## 2019-05-08 ASSESSMENT — PAIN SCALES - GENERAL: PAINLEVEL: NO PAIN (0)

## 2019-05-08 NOTE — TELEPHONE ENCOUNTER
05/08/2019 - received PA request thru [facility] for lorazepam.  Submitted as urgent request thru [facility].  Waiting for response.  Linn Valle, HIS Specialist.

## 2019-05-08 NOTE — NURSING NOTE
"Chief Complaint   Patient presents with     Hypertension     Diabetes     Thyroid Problem       Initial /88 (BP Location: Left arm, Patient Position: Sitting, Cuff Size: Adult Large)   Pulse 86   Temp 97.9  F (36.6  C) (Tympanic)   Resp 20   Ht 1.549 m (5' 1\")   Wt 91.2 kg (201 lb)   SpO2 98%   BMI 37.98 kg/m   Estimated body mass index is 37.98 kg/m  as calculated from the following:    Height as of this encounter: 1.549 m (5' 1\").    Weight as of this encounter: 91.2 kg (201 lb).  Medication Reconciliation: complete    Serenity Clinton LPN  "

## 2019-05-08 NOTE — LETTER
My Asthma Action Plan  Name: Edith Doty   YOB: 1970  Date: 5/6/2019   My doctor: Gonzalo Pino MD   My clinic: [facility] - [place]        My Control Medicine: None  My Rescue Medicine: Albuterol (Proair/Ventolin/Proventil) inhaler 2 puffs into lungs vannesa 6 hours as needed   My Asthma Severity: intermittent  Avoid your asthma triggers: smoke, upper respiratory infections, dust mites, pollens, mold, humidity, strong odors and fumes and cold air               GREEN ZONE   Good Control    I feel good    No cough or wheeze    Can work, sleep and play without asthma symptoms       Take your asthma control medicine every day.     1. If exercise triggers your asthma, take your rescue medication    15 minutes before exercise or sports, and    During exercise if you have asthma symptoms  2. Spacer to use with inhaler: If you have a spacer, make sure to use it with your inhaler             YELLOW ZONE Getting Worse  I have ANY of these:    I do not feel good    Cough or wheeze    Chest feels tight    Wake up at night   1. Keep taking your Green Zone medications  2. Start taking your rescue medicine:    every 20 minutes for up to 1 hour. Then every 4 hours for 24-48 hours.  3. If you stay in the Yellow Zone for more than 12-24 hours, contact your doctor.  4. If you do not return to the Green Zone in 12-24 hours or you get worse, start taking your oral steroid medicine if prescribed by your provider.           RED ZONE Medical Alert - Get Help  I have ANY of these:    I feel awful    Medicine is not helping    Breathing getting harder    Trouble walking or talking    Nose opens wide to breathe       1. Take your rescue medicine NOW  2. If your provider has prescribed an oral steroid medicine, start taking it NOW  3. Call your doctor NOW  4. If you are still in the Red Zone after 20 minutes and you have not reached your doctor:    Take your rescue medicine again and    Call 911 or go to the  emergency room right away    See your regular doctor within 2 weeks of an Emergency Room or Urgent Care visit for follow-up treatment.          Annual Reminders:  Meet with Asthma Educator,  Flu Shot in the Fall, consider Pneumonia Vaccination for patients with asthma (aged 19 and older).    Pharmacy:    [facility] - [place], [place]  [id number] - [place] - [address]   [facility] [place] - [address]  [id number] - [place], [place] - [address] [facility]  [address]  [id number] - [place], [place] - [address]                      Asthma Triggers  How To Control Things That Make Your Asthma Worse    Triggers are things that make your asthma worse.  Look at the list below to help you find your triggers and what you can do about them.  You can help prevent asthma flare-ups by staying away from your triggers.      Trigger                                                          What you can do   Cigarette Smoke  Tobacco smoke can make asthma worse. Do not allow smoking in your home, car or around you.  Be sure no one smokes at a child s day care or school.  If you smoke, ask your health care provider for ways to help you quit.  Ask family members to quit too.  Ask your health care provider for a referral to Quit Plan to help you quit smoking, or call 9-083-952-PLAN.     Colds, Flu, Bronchitis  These are common triggers of asthma. Wash your hands often.  Don t touch your eyes, nose or mouth.  Get a flu shot every year.     Dust Mites  These are tiny bugs that live in cloth or carpet. They are too small to see. Wash sheets and blankets in hot water every week.   Encase pillows and mattress in dust mite proof covers.  Avoid having carpet if you can. If you have carpet, vacuum weekly.   Use a dust mask and HEPA vacuum.   Pollen and Outdoor Mold  Some people are allergic to trees, grass, or weed pollen, or molds. Try to  keep your windows closed.  Limit time out doors when pollen count is high.   Ask you health care provider about taking medicine during allergy season.     Animal Dander  Some people are allergic to skin flakes, urine or saliva from pets with fur or feathers. Keep pets with fur or feathers out of your home.    If you can t keep the pet outdoors, then keep the pet out of your bedroom.  Keep the bedroom door closed.  Keep pets off cloth furniture and away from stuffed toys.     Mice, Rats, and Cockroaches  Some people are allergic to the waste from these pests.   Cover food and garbage.  Clean up spills and food crumbs.  Store grease in the refrigerator.   Keep food out of the bedroom.   Indoor Mold  This can be a trigger if your home has high moisture. Fix leaking faucets, pipes, or other sources of water.   Clean moldy surfaces.  Dehumidify basement if it is damp and smelly.   Smoke, Strong Odors, and Sprays  These can reduce air quality. Stay away from strong odors and sprays, such as perfume, powder, hair spray, paints, smoke incense, paint, cleaning products, candles and new carpet.   Exercise or Sports  Some people with asthma have this trigger. Be active!  Ask your doctor about taking medicine before sports or exercise to prevent symptoms.    Warm up for 5-10 minutes before and after sports or exercise.     Other Triggers of Asthma  Cold air:  Cover your nose and mouth with a scarf.  Sometimes laughing or crying can be a trigger.  Some medicines and food can trigger asthma.

## 2019-05-09 ASSESSMENT — ASTHMA QUESTIONNAIRES: ACT_TOTALSCORE: 25

## 2019-05-09 NOTE — TELEPHONE ENCOUNTER
"DENIAL - 05/09/2019 - received DENIAL from [facility] for lorazepam.  Denial reason:  Quantity limits' denial.  \"Your dose can be made with a lower number of a higher strength.  For example, you can use two 2 mg tablets instead of four 1 mg tablets. Your prescriber must prescribe a higher strength to reach your dose.  If a higher strength cannot be used, your prescriber must provide the reason.  You can get up to 3 tablets per day.\"  Pharmacy advised.  Documentation scanned to Epic.  Linn Valle, HIS Specialist.  "

## 2019-05-09 NOTE — TELEPHONE ENCOUNTER
Please advise how you would like script to be pended.   Please see denial reason below  FIONA SALMERON

## 2019-05-12 PROBLEM — F43.22 ADJUSTMENT DISORDER WITH ANXIOUS MOOD: Status: ACTIVE | Noted: 2019-05-12

## 2019-05-14 ENCOUNTER — TELEPHONE (OUTPATIENT)
Dept: FAMILY MEDICINE | Facility: OTHER | Age: 49
End: 2019-05-14

## 2019-05-14 DIAGNOSIS — I10 BENIGN ESSENTIAL HYPERTENSION: Primary | ICD-10-CM

## 2019-05-14 NOTE — TELEPHONE ENCOUNTER
Gonzalo Pino MD  You 6 minutes ago (8:54 AM)      1 mg tablets.  Q 6 hours prn    Routing comment

## 2019-05-16 RX ORDER — LORAZEPAM 1 MG/1
1 TABLET ORAL EVERY 8 HOURS PRN
Qty: 20 TABLET | Refills: 0 | Status: SHIPPED | OUTPATIENT
Start: 2019-05-16 | End: 2019-07-02

## 2019-05-22 DIAGNOSIS — J30.1 SEASONAL ALLERGIC RHINITIS DUE TO POLLEN: ICD-10-CM

## 2019-05-22 NOTE — TELEPHONE ENCOUNTER
Flonase       Last Written Prescription Date:  5/8/2019  Last Fill Quantity: 16g,   # refills: 1  Last Office Visit: 5/8/2019  Future Office visit:

## 2019-05-23 RX ORDER — FLUTICASONE PROPIONATE 50 MCG
SPRAY, SUSPENSION (ML) NASAL
Qty: 16 ML | Refills: 0 | Status: SHIPPED | OUTPATIENT
Start: 2019-05-23 | End: 2019-06-29

## 2019-06-03 ENCOUNTER — HOSPITAL ENCOUNTER (OUTPATIENT)
Dept: MRI IMAGING | Facility: HOSPITAL | Age: 49
Discharge: HOME | End: 2019-06-03
Attending: FAMILY MEDICINE | Admitting: FAMILY MEDICINE
Payer: COMMERCIAL

## 2019-06-03 DIAGNOSIS — I10 BENIGN ESSENTIAL HYPERTENSION: ICD-10-CM

## 2019-06-03 LAB
CREAT BLD-MCNC: 0.6 MG/DL (ref 0.52–1.04)
GFR SERPL CREATININE-BSD FRML MDRD: >90 ML/MIN/{1.73_M2}

## 2019-06-03 PROCEDURE — 74185 MRA ABD W OR W/O CNTRST: CPT | Mod: TC

## 2019-06-03 PROCEDURE — 82565 ASSAY OF CREATININE: CPT

## 2019-06-03 PROCEDURE — 25500064 ZZH RX 255 OP 636: Performed by: RADIOLOGY

## 2019-06-03 PROCEDURE — A9585 GADOBUTROL INJECTION: HCPCS | Performed by: RADIOLOGY

## 2019-06-03 RX ORDER — GADOBUTROL 604.72 MG/ML
7.5 INJECTION INTRAVENOUS ONCE
Status: COMPLETED | OUTPATIENT
Start: 2019-06-03 | End: 2019-06-03

## 2019-06-03 RX ORDER — GADOBUTROL 604.72 MG/ML
10 INJECTION INTRAVENOUS ONCE
Status: COMPLETED | OUTPATIENT
Start: 2019-06-03 | End: 2019-06-03

## 2019-06-03 RX ADMIN — GADOBUTROL 7.5 ML: 604.72 INJECTION INTRAVENOUS at 09:29

## 2019-06-03 RX ADMIN — GADOBUTROL 10 ML: 604.72 INJECTION INTRAVENOUS at 09:29

## 2019-06-22 DIAGNOSIS — I10 BENIGN ESSENTIAL HYPERTENSION: ICD-10-CM

## 2019-06-22 DIAGNOSIS — E03.9 ACQUIRED HYPOTHYROIDISM: ICD-10-CM

## 2019-06-24 RX ORDER — METOPROLOL SUCCINATE 100 MG/1
TABLET, EXTENDED RELEASE ORAL
Qty: 90 TABLET | Refills: 1 | Status: SHIPPED | OUTPATIENT
Start: 2019-06-24 | End: 2019-07-31

## 2019-06-24 RX ORDER — LEVOTHYROXINE SODIUM 175 UG/1
TABLET ORAL
Qty: 30 TABLET | Refills: 0 | Status: SHIPPED | OUTPATIENT
Start: 2019-06-24 | End: 2019-07-31

## 2019-06-29 DIAGNOSIS — J30.1 SEASONAL ALLERGIC RHINITIS DUE TO POLLEN: ICD-10-CM

## 2019-07-01 RX ORDER — FLUTICASONE PROPIONATE 50 MCG
SPRAY, SUSPENSION (ML) NASAL
Qty: 16 ML | Refills: 1 | Status: SHIPPED | OUTPATIENT
Start: 2019-07-01 | End: 2019-09-08

## 2019-07-02 ENCOUNTER — OFFICE VISIT (OUTPATIENT)
Dept: FAMILY MEDICINE | Facility: OTHER | Age: 49
End: 2019-07-02
Attending: NURSE PRACTITIONER
Payer: COMMERCIAL

## 2019-07-02 ENCOUNTER — ANCILLARY PROCEDURE (OUTPATIENT)
Dept: GENERAL RADIOLOGY | Facility: OTHER | Age: 49
End: 2019-07-02
Attending: NURSE PRACTITIONER
Payer: COMMERCIAL

## 2019-07-02 VITALS
HEART RATE: 86 BPM | OXYGEN SATURATION: 99 % | SYSTOLIC BLOOD PRESSURE: 136 MMHG | DIASTOLIC BLOOD PRESSURE: 72 MMHG | WEIGHT: 198 LBS | BODY MASS INDEX: 37.41 KG/M2

## 2019-07-02 DIAGNOSIS — M79.671 RIGHT FOOT PAIN: ICD-10-CM

## 2019-07-02 DIAGNOSIS — M79.671 RIGHT FOOT PAIN: Primary | ICD-10-CM

## 2019-07-02 PROCEDURE — G0463 HOSPITAL OUTPT CLINIC VISIT: HCPCS

## 2019-07-02 PROCEDURE — 99213 OFFICE O/P EST LOW 20 MIN: CPT | Performed by: NURSE PRACTITIONER

## 2019-07-02 PROCEDURE — 73630 X-RAY EXAM OF FOOT: CPT | Mod: TC,RT

## 2019-07-02 ASSESSMENT — PAIN SCALES - GENERAL: PAINLEVEL: NO PAIN (1)

## 2019-07-02 NOTE — PROGRESS NOTES
Subjective     Edith Doty is a 48 year old female who presents to clinic today for the following health issues:    HPI   Sore on foot       Duration: 3 weeks     Description (location/character/radiation): bottom of right foot    Intensity:  1/10    Accompanying signs and symptoms: callous like appearance. Painful when pressure is applied. Thinks it is possibly a blood blister or sliver    History (similar episodes/previous evaluation): None    Precipitating or alleviating factors: None    Therapies tried and outcome: None         Patient Active Problem List   Diagnosis     Hypothyroidism, acquired     Asthma, mild intermittent     Allergic rhinitis due to pollen     HTN (hypertension)     Nontoxic uninodular goiter     Obesity     Type 2 diabetes mellitus without complication, without long-term current use of insulin (H)     Achilles tendon tear     Asthma     Chronic lymphocytic thyroiditis     Hypertension     Adjustment disorder with anxious mood     Past Surgical History:   Procedure Laterality Date     CHOLECYSTECTOMY  1996     eye surgery for strabismus at ages 2 & 4         Social History     Tobacco Use     Smoking status: Never Smoker     Smokeless tobacco: Never Used     Tobacco comment: no passive exposure   Substance Use Topics     Alcohol use: Yes     Alcohol/week: 0.0 oz     Comment: socially      Family History   Problem Relation Age of Onset     Diabetes Father      Other - See Comments Sister         endometriosis     Allergies Sister         environmental     Other - See Comments Mother         fibromyalgia and endometreosis     Heart Disease Mother         murmer     Allergies Mother         environmental     Hypertension Brother      Allergies Brother         environmental     Asthma No family hx of      Thyroid Disease No family hx of          Current Outpatient Medications   Medication Sig Dispense Refill     albuterol (2.5 MG/3ML) 0.083% nebulizer solution USE 1 VIAL BY NEBULIZATION EVERY  6 HOURS AS NEEDED FOR SHORTNESS OF BREATH / DYSPNEA OR WHEEZING 90 mL 3     albuterol (PROAIR HFA) 108 (90 Base) MCG/ACT inhaler Inhale 2 puffs into the lungs every 6 hours 18 g 3     amLODIPine (NORVASC) 10 MG tablet Take 1 tablet (10 mg) by mouth daily 90 tablet 2     blood glucose monitoring (SOFTCLIX) lancets TEST TWICE DAILY 100 each 10     CONTOUR NEXT TEST test strip USE TO TEST TWICE DAILY 50 each 10     fluticasone (FLONASE) 50 MCG/ACT nasal spray INSTILL 2 SPRAYS IN EACH NOSTRIL DAILY 16 mL 1     IBUPROFEN PO        levothyroxine (SYNTHROID/LEVOTHROID) 175 MCG tablet 1 TABLET BY MOUTH DAILY 30 tablet 0     lisinopril (PRINIVIL/ZESTRIL) 10 MG tablet Take 1 tablet (10 mg) by mouth 2 times daily 180 tablet 3     metFORMIN (GLUCOPHAGE) 500 MG tablet Take 2 tablets (1,000 mg) by mouth 2 times daily (with meals) 120 tablet 3     metoprolol succinate ER (TOPROL-XL) 100 MG 24 hr tablet TAKE 1 TABLET BY MOUTH DAILY 90 tablet 1     Allergies   Allergen Reactions     Amoxicillin Trihydrate Itching     Augmentin       Clavulanic Acid Potassium Itching     Augmentin       Levaquin [Levofloxacin] Swelling     Penicillins Hives         Reviewed and updated as needed this visit by Provider         Review of Systems   ROS COMP: CONSTITUTIONAL: NEGATIVE for fever, chills, change in weight  INTEGUMENTARY/SKIN: right forefoot callused area very painful to stand on   RESP: NEGATIVE for significant cough or SOB  CV: NEGATIVE for chest pain, palpitations or peripheral edema      Objective    BP (!) 162/98 (Patient Position: Sitting)   Pulse 86   Wt 89.8 kg (198 lb)   SpO2 99%   BMI 37.41 kg/m    Body mass index is 37.41 kg/m .  Physical Exam   GENERAL: healthy, alert and no distress  MS: normal range of motion, no edema, peripheral pulses normal and tenderness to palpation  SKIN:  right forefoot base of 2nd toe thick peeling skin normal pigment   NEURO: Normal strength and tone, mentation intact and speech normal  PSYCH:  "mentation appears normal, affect normal/bright    Diagnostic Test Results:  Labs reviewed in Epic  Exam: XR FOOT RT G/E 3 VW   History:Female, age 48 years, pain fore foot in the skin no the bone;  Right foot pain     Comparison:  None  Technique: Three views are submitted.     Findings: Bones are normally mineralized. No evidence of acute or  subacute fracture.  No evidence of dislocation.  Slight soft tissue  prominence about the lateral aspect of the foot. Small plantar spur.                                                                   Impression:  No evidence of acute or subacute bony abnormality.      Mild soft tissue swelling in the mid/forefoot.     Mild midfoot degenerative changes.     KRISTNE GOMES MD        Assessment & Plan     1. Right foot pain  She states decreased pain when wearing a band-aid. Possible swelling to the area due to digging to try to get something out of foot. Discussed continue to use band-aid or corn pad for comfort. If pain continues she should follow up for further evaluation   - XR FOOT RT G/E 3 VW (Clinic Performed); Future     BMI:   Estimated body mass index is 37.41 kg/m  as calculated from the following:    Height as of 5/8/19: 1.549 m (5' 1\").    Weight as of this encounter: 89.8 kg (198 lb).           See Patient Instructions    Return for keep scheduled appointment.    COLLEEN Horta [facility] - [place]      "

## 2019-07-02 NOTE — NURSING NOTE
"Chief Complaint   Patient presents with     Blister     sore on foot       Initial BP (!) 162/98 (Patient Position: Sitting)   Pulse 86   Wt 89.8 kg (198 lb)   SpO2 99%   BMI 37.41 kg/m   Estimated body mass index is 37.41 kg/m  as calculated from the following:    Height as of 5/8/19: 1.549 m (5' 1\").    Weight as of this encounter: 89.8 kg (198 lb).  Medication Reconciliation: complete  "

## 2019-07-02 NOTE — PATIENT INSTRUCTIONS
Patient Education     Treating Corns and Calluses  If your corns or calluses are mild, reducing friction may help. Different shoes, moleskin patches, or soft pads may be all the treatment you need. In more severe cases, treating tissue buildup may require your doctor s care. Sometimes custom-made shoe inserts (orthotics) or special pads are prescribed to reduce friction and pressure.        Change shoes  If you have corns, your doctor may suggest wearing shoes that have more toe room. This way, buckled joints are less likely to be pinched against the top of the shoe. If you have calluses, wearing a cushioned insole, arch support, or heel counter can help reduce friction.  Visit your doctor  In some cases, your doctor may trim away the outer layers of skin that make up the corn or callus. For a painful corn, medicine may be injected beneath the built-up tissue.  Wear orthotics  Orthotics are specially made to meet the needs of your feet. They cushion calluses or divert pressure away from these problem areas. Worn as directed, orthotics help limit existing problems and prevent new ones from forming.  If you need surgery  If a bone or joint is out of place, certain parts of your foot may be under too much pressure. This can cause severe corns and calluses. In such cases, surgery may be the best way to correct the problem.  Outpatient procedures  In most cases, surgery to improve bone position is an outpatient procedure. Your doctor may cut away excess bone, reposition prominent bones, or even fuse joints. Sometimes tendons or ligaments are cut to reduce tension on a bone or joint. Your doctor will talk with you about the procedure that is best suited to your needs.  Date Last Reviewed: 7/1/2016 2000-2018 [employer]. [address] [place] [address]. All rights reserved. This information is not intended as a substitute for professional medical care. Always follow your healthcare professional's  instructions.

## 2019-07-20 DIAGNOSIS — I10 BENIGN ESSENTIAL HYPERTENSION: ICD-10-CM

## 2019-07-22 RX ORDER — METOPROLOL SUCCINATE 100 MG/1
TABLET, EXTENDED RELEASE ORAL
Qty: 30 TABLET | Refills: 0 | OUTPATIENT
Start: 2019-07-22

## 2019-07-24 ENCOUNTER — TELEPHONE (OUTPATIENT)
Dept: EDUCATION SERVICES | Facility: HOSPITAL | Age: 49
End: 2019-07-24

## 2019-07-24 DIAGNOSIS — E11.65 TYPE 2 DIABETES MELLITUS WITH HYPERGLYCEMIA, WITHOUT LONG-TERM CURRENT USE OF INSULIN (H): Primary | ICD-10-CM

## 2019-07-30 NOTE — PROGRESS NOTES
"Subjective     Edith Doty is a 48 year old female who presents to clinic today for the following health issues:    HPI   WART(S)      Onset: ***    Description (location/number): ***    Accompanying signs and symptoms: Painful: { :702785::\"no\"}    History: prior warts: { :754245::\"no\"}    Therapies tried and outcome: {NONE DEFAULTED:329099::\"None\"}    {additonal problems for provider to add (Optional):784047}    {HIST REVIEW/ LINKS 2 (Optional):848877}    {Additional problems for the provider to add (optional):168262}  Reviewed and updated as needed this visit by Provider         Review of Systems   {ROS COMP (Optional):708599}      Objective    There were no vitals taken for this visit.  There is no height or weight on file to calculate BMI.  Physical Exam   {Exam List (Optional):705997}    {Diagnostic Test Results (Optional):725657::\"Diagnostic Test Results:\",\"Labs reviewed in Epic\"}        {PROVIDER CHARTING PREFERENCE:049760}      "

## 2019-07-31 ENCOUNTER — OFFICE VISIT (OUTPATIENT)
Dept: FAMILY MEDICINE | Facility: OTHER | Age: 49
End: 2019-07-31
Attending: FAMILY MEDICINE
Payer: COMMERCIAL

## 2019-07-31 VITALS
OXYGEN SATURATION: 98 % | HEIGHT: 61 IN | BODY MASS INDEX: 37 KG/M2 | HEART RATE: 93 BPM | DIASTOLIC BLOOD PRESSURE: 94 MMHG | RESPIRATION RATE: 22 BRPM | TEMPERATURE: 97.9 F | WEIGHT: 196 LBS | SYSTOLIC BLOOD PRESSURE: 160 MMHG

## 2019-07-31 DIAGNOSIS — E03.9 ACQUIRED HYPOTHYROIDISM: ICD-10-CM

## 2019-07-31 DIAGNOSIS — E11.9 TYPE 2 DIABETES MELLITUS WITHOUT COMPLICATION, WITHOUT LONG-TERM CURRENT USE OF INSULIN (H): ICD-10-CM

## 2019-07-31 DIAGNOSIS — R22.41 FOOT MASS, RIGHT: Primary | ICD-10-CM

## 2019-07-31 DIAGNOSIS — I10 BENIGN ESSENTIAL HYPERTENSION: ICD-10-CM

## 2019-07-31 PROCEDURE — 99213 OFFICE O/P EST LOW 20 MIN: CPT | Performed by: FAMILY MEDICINE

## 2019-07-31 PROCEDURE — G0463 HOSPITAL OUTPT CLINIC VISIT: HCPCS

## 2019-07-31 RX ORDER — LISINOPRIL 10 MG/1
10 TABLET ORAL 2 TIMES DAILY
Qty: 180 TABLET | Refills: 3 | Status: SHIPPED | OUTPATIENT
Start: 2019-07-31 | End: 2019-12-02

## 2019-07-31 RX ORDER — LEVOTHYROXINE SODIUM 175 UG/1
175 TABLET ORAL DAILY
Qty: 30 TABLET | Refills: 0 | Status: SHIPPED | OUTPATIENT
Start: 2019-07-31 | End: 2019-09-13

## 2019-07-31 RX ORDER — AMLODIPINE BESYLATE 10 MG/1
10 TABLET ORAL DAILY
Qty: 90 TABLET | Refills: 2 | Status: SHIPPED | OUTPATIENT
Start: 2019-07-31 | End: 2019-12-02

## 2019-07-31 RX ORDER — METOPROLOL SUCCINATE 100 MG/1
100 TABLET, EXTENDED RELEASE ORAL DAILY
Qty: 90 TABLET | Refills: 1 | Status: SHIPPED | OUTPATIENT
Start: 2019-07-31 | End: 2019-12-02

## 2019-07-31 ASSESSMENT — ANXIETY QUESTIONNAIRES
2. NOT BEING ABLE TO STOP OR CONTROL WORRYING: SEVERAL DAYS
IF YOU CHECKED OFF ANY PROBLEMS ON THIS QUESTIONNAIRE, HOW DIFFICULT HAVE THESE PROBLEMS MADE IT FOR YOU TO DO YOUR WORK, TAKE CARE OF THINGS AT HOME, OR GET ALONG WITH OTHER PEOPLE: NOT DIFFICULT AT ALL
4. TROUBLE RELAXING: NOT AT ALL
6. BECOMING EASILY ANNOYED OR IRRITABLE: NOT AT ALL
7. FEELING AFRAID AS IF SOMETHING AWFUL MIGHT HAPPEN: NOT AT ALL
GAD7 TOTAL SCORE: 2
5. BEING SO RESTLESS THAT IT IS HARD TO SIT STILL: NOT AT ALL
3. WORRYING TOO MUCH ABOUT DIFFERENT THINGS: NOT AT ALL
1. FEELING NERVOUS, ANXIOUS, OR ON EDGE: SEVERAL DAYS

## 2019-07-31 ASSESSMENT — PAIN SCALES - GENERAL: PAINLEVEL: NO PAIN (0)

## 2019-07-31 ASSESSMENT — PATIENT HEALTH QUESTIONNAIRE - PHQ9: SUM OF ALL RESPONSES TO PHQ QUESTIONS 1-9: 0

## 2019-07-31 ASSESSMENT — MIFFLIN-ST. JEOR: SCORE: 1456.43

## 2019-07-31 NOTE — NURSING NOTE
"Chief Complaint   Patient presents with     derm issue       Initial BP (!) 160/94 (BP Location: Left arm, Patient Position: Sitting, Cuff Size: Adult Large)   Pulse 93   Temp 97.9  F (36.6  C) (Tympanic)   Resp 22   Ht 1.549 m (5' 1\")   Wt 88.9 kg (196 lb)   SpO2 98%   BMI 37.03 kg/m   Estimated body mass index is 37.03 kg/m  as calculated from the following:    Height as of this encounter: 1.549 m (5' 1\").    Weight as of this encounter: 88.9 kg (196 lb).  Medication Reconciliation: complete   Serenity Clinton LPN    "

## 2019-07-31 NOTE — PROGRESS NOTES
Subjective     Edith Doty is a 48 year old female who presents to clinic today for the following health issues:    HPI   Derm problem/ possible wart      Duration: 6 weeks    Description (location/character/radiation): small bump on bottom of right foot.    Intensity:  mild    Accompanying signs and symptoms: none    History (similar episodes/previous evaluation): None    Precipitating or alleviating factors: None    Therapies tried and outcome: None       Patient Active Problem List   Diagnosis     Hypothyroidism, acquired     Asthma, mild intermittent     Allergic rhinitis due to pollen     HTN (hypertension)     Nontoxic uninodular goiter     Obesity     Type 2 diabetes mellitus without complication, without long-term current use of insulin (H)     Achilles tendon tear     Asthma     Chronic lymphocytic thyroiditis     Hypertension     Adjustment disorder with anxious mood     Past Surgical History:   Procedure Laterality Date     CHOLECYSTECTOMY  1996     eye surgery for strabismus at ages 2 & 4         Social History     Tobacco Use     Smoking status: Never Smoker     Smokeless tobacco: Never Used     Tobacco comment: no passive exposure   Substance Use Topics     Alcohol use: Yes     Alcohol/week: 0.0 oz     Comment: socially      Family History   Problem Relation Age of Onset     Diabetes Father      Other - See Comments Sister         endometriosis     Allergies Sister         environmental     Other - See Comments Mother         fibromyalgia and endometreosis     Heart Disease Mother         murmer     Allergies Mother         environmental     Hypertension Brother      Allergies Brother         environmental     Asthma No family hx of      Thyroid Disease No family hx of          Current Outpatient Medications   Medication Sig Dispense Refill     albuterol (2.5 MG/3ML) 0.083% nebulizer solution USE 1 VIAL BY NEBULIZATION EVERY 6 HOURS AS NEEDED FOR SHORTNESS OF BREATH / DYSPNEA OR WHEEZING 90 mL 3      albuterol (PROAIR HFA) 108 (90 Base) MCG/ACT inhaler Inhale 2 puffs into the lungs every 6 hours 18 g 3     amLODIPine (NORVASC) 10 MG tablet Take 1 tablet (10 mg) by mouth daily 90 tablet 2     blood glucose monitoring (SOFTCLIX) lancets TEST TWICE DAILY 100 each 10     CONTOUR NEXT TEST test strip USE TO TEST TWICE DAILY 50 each 10     fluticasone (FLONASE) 50 MCG/ACT nasal spray INSTILL 2 SPRAYS IN EACH NOSTRIL DAILY 16 mL 1     IBUPROFEN PO        levothyroxine (SYNTHROID/LEVOTHROID) 175 MCG tablet Take 1 tablet (175 mcg) by mouth daily 30 tablet 0     lisinopril (PRINIVIL/ZESTRIL) 10 MG tablet Take 1 tablet (10 mg) by mouth 2 times daily 180 tablet 3     metFORMIN (GLUCOPHAGE) 500 MG tablet Take 2 tablets (1,000 mg) by mouth 2 times daily (with meals) 120 tablet 3     metoprolol succinate ER (TOPROL-XL) 100 MG 24 hr tablet Take 1 tablet (100 mg) by mouth daily 90 tablet 1     Allergies   Allergen Reactions     Amoxicillin Trihydrate Itching     Augmentin       Clavulanic Acid Potassium Itching     Augmentin       Levaquin [Levofloxacin] Swelling     Penicillins Hives     Recent Labs   Lab Test 08/07/19 06/03/19  0905 04/08/19  0924 06/25/18  0942 06/10/18  1442  08/15/17  0840 09/17/13  1039   A1C 6.5*  --  7.1* 6.5*  --    < >  --   --    LDL  --   --  78  --   --   --  63  --    HDL  --   --  73  --   --   --  52  --    TRIG  --   --  235*  --   --   --  294*  --    ALT  --   --   --   --  72*  --  39 48   CR  --   --   --  0.65 0.61  --  0.68  --    GFRESTIMATED  --  >90  --  >90 >90  --  >90  --    GFRESTBLACK  --  >90  --  >90 >90  --  >90  --    POTASSIUM  --   --   --  4.0 3.5  --  4.1  --    TSH  --   --   --  0.14*  --   --   --   --     < > = values in this interval not displayed.      BP Readings from Last 3 Encounters:   08/07/19 130/70   07/31/19 (!) 160/94   07/02/19 136/72    Wt Readings from Last 3 Encounters:   08/07/19 89.8 kg (198 lb)   07/31/19 88.9 kg (196 lb)   07/02/19 89.8 kg (198 lb)     "                Reviewed and updated as needed this visit by Provider         Review of Systems   ROS COMP: Constitutional, HEENT, cardiovascular, pulmonary, gi and gu systems are negative, except as otherwise noted.      Objective    BP (!) 160/94 (BP Location: Left arm, Patient Position: Sitting, Cuff Size: Adult Large)   Pulse 93   Temp 97.9  F (36.6  C) (Tympanic)   Resp 22   Ht 1.549 m (5' 1\")   Wt 88.9 kg (196 lb)   SpO2 98%   BMI 37.03 kg/m    Body mass index is 37.03 kg/m .  Physical Exam   Constitutional: She is oriented to person, place, and time. She appears well-developed and well-nourished. No distress.   Neurological: She is alert and oriented to person, place, and time.   Skin: Skin is warm and dry.   There is a tender nodule noted right plantar forefoot   Psychiatric: She has a normal mood and affect.   Nursing note and vitals reviewed.       Diagnostic Test Results:  Labs reviewed in Epic        Assessment & Plan     1. HTN (hypertension)  Refilled  - amLODIPine (NORVASC) 10 MG tablet; Take 1 tablet (10 mg) by mouth daily  Dispense: 90 tablet; Refill: 2  - lisinopril (PRINIVIL/ZESTRIL) 10 MG tablet; Take 1 tablet (10 mg) by mouth 2 times daily  Dispense: 180 tablet; Refill: 3  - metoprolol succinate ER (TOPROL-XL) 100 MG 24 hr tablet; Take 1 tablet (100 mg) by mouth daily  Dispense: 90 tablet; Refill: 1  - Basic metabolic panel; Future    2. Hypothyroidism, acquired  Refilled  - levothyroxine (SYNTHROID/LEVOTHROID) 175 MCG tablet; Take 1 tablet (175 mcg) by mouth daily  Dispense: 30 tablet; Refill: 0    3. Type 2 diabetes mellitus without complication, without long-term current use of insulin (H)  Refilled  - metFORMIN (GLUCOPHAGE) 500 MG tablet; Take 2 tablets (1,000 mg) by mouth 2 times daily (with meals)  Dispense: 120 tablet; Refill: 3    4. Foot mass, right  Suspect flat plantar wart.  Appointment with Podiatry scheduled for evaluation and recommendations for further management.   - " "PODIATRY/FOOT & ANKLE SURGERY REFERRAL     BMI:   Estimated body mass index is 37.03 kg/m  as calculated from the following:    Height as of this encounter: 1.549 m (5' 1\").    Weight as of this encounter: 88.9 kg (196 lb).   Weight management plan: Discussed healthy diet and exercise guidelines        See Patient Instructions    No follow-ups on file.    Gonzalo Pino MD  [facility] [place]      "

## 2019-08-01 ASSESSMENT — ANXIETY QUESTIONNAIRES: GAD7 TOTAL SCORE: 2

## 2019-08-07 ENCOUNTER — TELEPHONE (OUTPATIENT)
Dept: EDUCATION SERVICES | Facility: HOSPITAL | Age: 49
End: 2019-08-07

## 2019-08-07 ENCOUNTER — HOSPITAL ENCOUNTER (OUTPATIENT)
Dept: EDUCATION SERVICES | Facility: HOSPITAL | Age: 49
Discharge: HOME | End: 2019-08-07
Attending: FAMILY MEDICINE | Admitting: FAMILY MEDICINE
Payer: COMMERCIAL

## 2019-08-07 VITALS
WEIGHT: 198 LBS | HEIGHT: 61 IN | SYSTOLIC BLOOD PRESSURE: 130 MMHG | BODY MASS INDEX: 37.38 KG/M2 | HEART RATE: 79 BPM | DIASTOLIC BLOOD PRESSURE: 70 MMHG | OXYGEN SATURATION: 99 %

## 2019-08-07 DIAGNOSIS — E11.9 TYPE 2 DIABETES MELLITUS WITHOUT COMPLICATION, WITHOUT LONG-TERM CURRENT USE OF INSULIN (H): Primary | ICD-10-CM

## 2019-08-07 LAB — HBA1C MFR BLD: 6.5 % (ref 0–5.7)

## 2019-08-07 PROCEDURE — 36416 COLLJ CAPILLARY BLOOD SPEC: CPT

## 2019-08-07 PROCEDURE — G0108 DIAB MANAGE TRN  PER INDIV: HCPCS

## 2019-08-07 PROCEDURE — 83036 HEMOGLOBIN GLYCOSYLATED A1C: CPT

## 2019-08-07 ASSESSMENT — MIFFLIN-ST. JEOR: SCORE: 1465.5

## 2019-08-07 ASSESSMENT — PAIN SCALES - GENERAL: PAINLEVEL: NO PAIN (0)

## 2019-08-07 NOTE — TELEPHONE ENCOUNTER
Met with Edith today. She is taking Metformin 1000 mg bid. Readings: fastin-153 (all above target)  And post-supper: 180, 197. Asking what to do to decrease AM readings. Suggested Adelina is open to trying it.    Could we please start Trulicity 0.75 mg weekly?

## 2019-08-07 NOTE — PROGRESS NOTES
"Diabetes Self-Management Education & Support    Diabetes Education Self Management & Training    SUBJECTIVE/OBJECTIVE:  Presents for: Individual review  Accompanied by: Self  Diabetes education in the past 24mo: Yes  Focus of Visit: Monitoring, Taking Medication  Diabetes type: Type 2  Date of diagnosis: 8/30/17  Disease course: Stable  Diabetes management related comments/concerns: I want to have lower AM readings  Transportation concerns: No  Other concerns:: None  Cultural Influences/Ethnic Background:  American      Diabetes Symptoms & Complications  Blurred vision: No  Fatigue: No  Neuropathy: No  Foot ulcerations: No  Polydipsia: No  Polyphagia: No  Polyuria: No  Visual change: No  Weakness: No  Weight loss: No  Slow healing wounds: No  Recent Infection(s): No  Symptom course: Stable  Weight trend: Stable  Autonomic neuropathy: No  CVA: No  Heart disease: No  Nephropathy: No  Peripheral neuropathy: No  Peripheral Vascular Disease: No  Retinopathy: No  Sexual dysfunction: No    Patient Problem List and Family Medical History reviewed for relevant medical history, current medical status, and diabetes risk factors.    Vitals:  /70 (BP Location: Right arm, Patient Position: Chair, Cuff Size: Adult Large)   Pulse 79   Ht 1.549 m (5' 1\")   Wt 89.8 kg (198 lb)   SpO2 99%   BMI 37.41 kg/m    Estimated body mass index is 37.41 kg/m  as calculated from the following:    Height as of this encounter: 1.549 m (5' 1\").    Weight as of this encounter: 89.8 kg (198 lb).   Last 3 BP:   BP Readings from Last 3 Encounters:   08/07/19 130/70   07/31/19 (!) 160/94   07/02/19 136/72       History   Smoking Status     Never Smoker   Smokeless Tobacco     Never Used     Comment: no passive exposure       Labs:  Lab Results   Component Value Date    A1C 6.5 08/07/2019     Lab Results   Component Value Date     06/25/2018     Lab Results   Component Value Date    LDL 78 04/08/2019     HDL Cholesterol   Date Value Ref " Range Status   04/08/2019 73 >49 mg/dL Final   ]  GFR Estimate   Date Value Ref Range Status   06/03/2019 >90 >60 mL/min/[1.73_m2] Final     GFR Estimate If Black   Date Value Ref Range Status   06/03/2019 >90 >60 mL/min/[1.73_m2] Final     Lab Results   Component Value Date    CR 0.65 06/25/2018     No results found for: MICROALBUMIN    Healthy Eating  Healthy Eating Assessed Today: No  Has patient met with a dietitian in the past?: No    Being Active  Being Active Assessed Today: Yes  Exercise:: Currently not exercising  Barrier to exercise: Physical limitation    Monitoring  Monitoring Assessed Today: Yes  Did patient bring glucose meter to appointment? : Yes  Blood Glucose Meter: Trulyt  Low Glucose Range (mg/dL): 110-130  High Glucose Range (mg/dL): 180-200        Taking Medications  Diabetes Medication(s)     Biguanides       metFORMIN (GLUCOPHAGE) 500 MG tablet    Take 2 tablets (1,000 mg) by mouth 2 times daily (with meals)          Taking Medication Assessed Today: Yes  Current Treatments: Oral Agent (monotherapy)  Problems taking diabetes medications regularly?: No  Diabetes medication side effects?: No  Treatment Compliance: All of the time    Problem Solving  Problem Solving Assessed Today: Yes  Hypoglycemia Frequency: Never  Patient carries a carbohydrate source: No  Medical alert: No  Severe weather/disaster plan for diabetes management?: No  DKA prevention plan?: No              Reducing Risks  Reducing Risks Assessed Today: No  Has dilated eye exam at least once a year?: Yes    Healthy Coping  Healthy Coping Assessed Today: Yes  Emotional response to diabetes: Acceptance, Confidence diabetes can be controlled  Informal Support system:: Children, Spouse  Stage of change: ACTION (Actively working towards change)  Difficulty affording diabetes management supplies?: No  Support resources: None  Patient Activation Measure Survey Score:  JAIME Score (Last Two) 12/19/2018   JAIME Raw Score 40   Activation  Score 100   JAIME Level 4       ASSESSMENT:  Readings range as follows: fastin-153 and post-supper: 180, 197. 2 week average: 142.    Edith would like to have lower fasting BG readings. Discussed trying a GLP1        Patient's most recent   Lab Results   Component Value Date    A1C 6.5 2019    is meeting goal of <7.0    INTERVENTION:   Diabetes knowledge and skills assessment:     Patient is knowledgeable in diabetes management concepts related to: Healthy Eating, Being Active, Monitoring and Taking Medication    Patient needs further education on the following diabetes management concepts: Taking Medication    Based on learning assessment above, most appropriate setting for further diabetes education would be: Individual setting.    Education provided today on:  AADE Self-Care Behaviors:  Diabetes Pathophysiology  Monitoring: log and interpret results, individual blood glucose targets and frequency of monitoring  Taking Medication: action of prescribed medication, drawing up, administering and storing injectable diabetes medications, proper site selection and rotation for injections, side effects of prescribed medications, when to take medications and discussed Trulicity: benefits, actions, side effects and how to use the Trulicity pen.    Opportunities for ongoing education and support in diabetes-self management were discussed.    Pt verbalized understanding of concepts discussed and recommendations provided today.       Education Materials Provided:  No new materials provided today    PLAN:  See Patient Instructions for co-developed, patient-stated behavior change goals.  I will check with Dr. Pino about starting Trulicity  AVS printed and provided to patient today. See Follow-Up section for recommended follow-up.      Time Spent: 30 minutes  Encounter Type: Individual    Any diabetes medication dose changes were made via the CDE Protocol and Collaborative Practice Agreement with the patient's  primary care provider. A copy of this encounter was shared with the provider.

## 2019-08-14 ENCOUNTER — TELEPHONE (OUTPATIENT)
Dept: FAMILY MEDICINE | Facility: OTHER | Age: 49
End: 2019-08-14

## 2019-08-14 DIAGNOSIS — E11.9 TYPE 2 DIABETES MELLITUS WITHOUT COMPLICATION, WITHOUT LONG-TERM CURRENT USE OF INSULIN (H): Primary | ICD-10-CM

## 2019-08-14 NOTE — TELEPHONE ENCOUNTER
Please, will you call Edith to let her know that we received an ok to start Trulicity and that I have sample pens for her. I have sent in the prescription and will need to have a prior authorization completed. Please try the sample first before picking up from the pharmacy.    We could schedule a short visit with me and she can  the sample.    Thanks!

## 2019-08-14 NOTE — TELEPHONE ENCOUNTER
Received PA request from [employer] for Trulicity. Submitted as urgent request through [facility]. Waiting for response.

## 2019-08-14 NOTE — TELEPHONE ENCOUNTER
I called the pt and notified. She will be out of town until 08/20/19 and will p/u at that time. Appt scheduled for Follow-up on 08/28/19, pt notified to bring BG meter.

## 2019-08-15 NOTE — TELEPHONE ENCOUNTER
Will need to try two other medications before insurance will approve Trulicity. [facility] [place]

## 2019-08-15 NOTE — TELEPHONE ENCOUNTER
"DENIAL received from [facility] for Trulicity. Denial reason: \"Patient must have tried at least two preferred drugs. Patient has tried zero. The preferred drugs are:      Pharmacy advised. Forms scanned to [employer].  "

## 2019-08-20 ENCOUNTER — OFFICE VISIT (OUTPATIENT)
Dept: PODIATRY | Facility: OTHER | Age: 49
End: 2019-08-20
Attending: PODIATRIST
Payer: COMMERCIAL

## 2019-08-20 VITALS
DIASTOLIC BLOOD PRESSURE: 96 MMHG | HEART RATE: 90 BPM | HEIGHT: 61 IN | TEMPERATURE: 97.8 F | RESPIRATION RATE: 16 BRPM | BODY MASS INDEX: 37.38 KG/M2 | SYSTOLIC BLOOD PRESSURE: 171 MMHG | OXYGEN SATURATION: 98 % | WEIGHT: 198 LBS

## 2019-08-20 DIAGNOSIS — M21.70 ACQUIRED UNEQUAL LIMB LENGTH: ICD-10-CM

## 2019-08-20 DIAGNOSIS — M21.41 PES PLANUS OF BOTH FEET: ICD-10-CM

## 2019-08-20 DIAGNOSIS — M67.471 GANGLION CYST OF RIGHT FOOT: Primary | ICD-10-CM

## 2019-08-20 DIAGNOSIS — M20.42 ACQUIRED HAMMER TOE DEFORMITY OF LESSER TOE OF BOTH FEET: ICD-10-CM

## 2019-08-20 DIAGNOSIS — M21.42 PES PLANUS OF BOTH FEET: ICD-10-CM

## 2019-08-20 DIAGNOSIS — E11.9 DIABETES MELLITUS TYPE 2, NONINSULIN DEPENDENT (H): ICD-10-CM

## 2019-08-20 DIAGNOSIS — M20.41 ACQUIRED HAMMER TOE DEFORMITY OF LESSER TOE OF BOTH FEET: ICD-10-CM

## 2019-08-20 DIAGNOSIS — M62.461 GASTROCNEMIUS EQUINUS OF RIGHT LOWER EXTREMITY: ICD-10-CM

## 2019-08-20 DIAGNOSIS — L60.3 ONYCHODYSTROPHY: ICD-10-CM

## 2019-08-20 DIAGNOSIS — L84 CALLUS OF FOOT: ICD-10-CM

## 2019-08-20 PROCEDURE — G0463 HOSPITAL OUTPT CLINIC VISIT: HCPCS

## 2019-08-20 PROCEDURE — 99203 OFFICE O/P NEW LOW 30 MIN: CPT | Performed by: PODIATRIST

## 2019-08-20 ASSESSMENT — PAIN SCALES - GENERAL: PAINLEVEL: NO PAIN (0)

## 2019-08-20 ASSESSMENT — MIFFLIN-ST. JEOR: SCORE: 1465.5

## 2019-08-20 NOTE — LETTER
"    8/20/2019         RE: Edith Doty  [address] [facility] [address] [place] [id number]        Dear Colleague,    Thank you for referring your patient, Edith Doty, to the [facility]. Please see a copy of my visit note below.    Chief complaint: Patient presents with:  Consult: referred by Dr Pino for right foot mass      History of Present Illness: This 48 year old NIDDM female is seen at the request of Dr. Pino for evaluation and suggestions of management of a RIGHT foot mass. The mass developed on the RIGHT plantar foot around June, 2019. She saw Heahter Mtz who told her it was a callus and that it would go away. She then went back ot her PCP, Dr. Pino, in July, 2019, and he thought it was wart. The mass has developed a blood blister appearance. She wondered if this was a blister or a wart or a callus. This past Saturday, 08/17/2019, the blister seemed to pop and deflate, but there was no drainage on the bandage she was wearing. It is no longer painful, but it tends to hurt more when she is walking on it more. Last HbA1C was 6.5% on 08/07/2019.    Patient has a h/o a LEFT Achilles rupture and repair in 2009. She has had no concerns since that time.     BP (!) 171/96 (BP Location: Left arm, Cuff Size: Adult Regular)   Pulse 90   Temp 97.8  F (36.6  C) (Tympanic)   Resp 16   Ht 1.549 m (5' 1\")   Wt 89.8 kg (198 lb)   SpO2 98%   BMI 37.41 kg/m       Patient Active Problem List   Diagnosis     Hypothyroidism, acquired     Asthma, mild intermittent     Allergic rhinitis due to pollen     HTN (hypertension)     Nontoxic uninodular goiter     Obesity     Type 2 diabetes mellitus without complication, without long-term current use of insulin (H)     Achilles tendon tear     Asthma     Chronic lymphocytic thyroiditis     Hypertension     Adjustment disorder with anxious mood       Past Surgical History:   Procedure Laterality Date     CHOLECYSTECTOMY  1996     eye surgery for " strabismus at ages 2 & 4         Current Outpatient Medications   Medication     albuterol (2.5 MG/3ML) 0.083% nebulizer solution     albuterol (PROAIR HFA) 108 (90 Base) MCG/ACT inhaler     amLODIPine (NORVASC) 10 MG tablet     blood glucose monitoring (SOFTCLIX) lancets     CONTOUR NEXT TEST test strip     dulaglutide (TRULICITY) 0.75 MG/0.5ML pen     fluticasone (FLONASE) 50 MCG/ACT nasal spray     IBUPROFEN PO     levothyroxine (SYNTHROID/LEVOTHROID) 175 MCG tablet     lisinopril (PRINIVIL/ZESTRIL) 10 MG tablet     metFORMIN (GLUCOPHAGE) 500 MG tablet     metoprolol succinate ER (TOPROL-XL) 100 MG 24 hr tablet     sitagliptin (JANUVIA) 25 MG tablet     No current facility-administered medications for this visit.           Allergies   Allergen Reactions     Amoxicillin Trihydrate Itching     Augmentin       Clavulanic Acid Potassium Itching     Augmentin       Levaquin [Levofloxacin] Swelling     Penicillins Hives       Family History   Problem Relation Age of Onset     Diabetes Father      Other - See Comments Sister         endometriosis     Allergies Sister         environmental     Other - See Comments Mother         fibromyalgia and endometreosis     Heart Disease Mother         murmer     Allergies Mother         environmental     Hypertension Brother      Allergies Brother         environmental     Asthma No family hx of      Thyroid Disease No family hx of        Social History     Socioeconomic History     Marital status:      Spouse name: None     Number of children: None     Years of education: None     Highest education level: None   Occupational History     Occupation: [employer]     Comment: full-time   Social Needs     Financial resource strain: None     Food insecurity:     Worry: None     Inability: None     Transportation needs:     Medical: None     Non-medical: None   Tobacco Use     Smoking status: Never Smoker     Smokeless tobacco: Never Used     Tobacco comment: no passive  exposure   Substance and Sexual Activity     Alcohol use: Yes     Alcohol/week: 0.0 oz     Comment: socially      Drug use: No     Sexual activity: Yes     Partners: Male   Lifestyle     Physical activity:     Days per week: None     Minutes per session: None     Stress: None   Relationships     Social connections:     Talks on phone: None     Gets together: None     Attends Yazidi service: None     Active member of club or organization: None     Attends meetings of clubs or organizations: None     Relationship status: None     Intimate partner violence:     Fear of current or ex partner: None     Emotionally abused: None     Physically abused: None     Forced sexual activity: None   Other Topics Concern      Service No     Blood Transfusions Yes     Comment: Permits if needed     Caffeine Concern No     Occupational Exposure Not Asked     Hobby Hazards Not Asked     Sleep Concern Not Asked     Stress Concern Not Asked     Weight Concern Not Asked     Special Diet Not Asked     Back Care Not Asked     Exercise Not Asked     Bike Helmet Not Asked     Seat Belt Yes     Self-Exams Yes     Parent/sibling w/ CABG, MI or angioplasty before 65F 55M? No   Social History Narrative     None       ROS: 10 point ROS neg other than the symptoms noted above in the HPI.  EXAM  Constitutional: healthy, alert and no distress    Psychiatric: mentation appears normal and affect normal/bright    VASCULAR:  -Dorsalis pedis pulse +2/4 b/l  -Posterior tibial pulse +1/4 b/l  -Capillary refill time < 3 seconds to b/l hallux  NEURO:  -Protective sensation intact with SWM +10/10 RIGHT and +10/10 LEFT on 08/21/2019  -Light touch sensation intact to b/l plantar forefoot  DERM:  -Soft tissue mass, fluid-filled, soft and mobile, superficial, mild purple discoloration at the base of the cyst  -Skin temperature, texture and turgor WNL b/l  -Toenails mildly elongated, thickened, dystrophic and discolored x 10  MSK:  -Moderate decrease in  arch height while patient is NWB  -DORSIFLEXION contracture to MTPJ 2-5 b/l with flexion contracture to PIPJ of digits 2-5 b/l   -Pain on palpation to RIGHT plantar 4th plantar MTPJ  -Muscle strength of ankles +5/5 for dorsiflexion, plantarflexion, ABDUction and ADDuction b/l  -Ankle joint passive ROM within normal limits except for dorsiflexion:    Dorsiflexion, RIGHT Straight knee -5 to 0 degrees    Dorsiflexion, LEFT Straight knee +5 to +10 degrees  -RIGHT foot medial malleolus is 0.5cm longer than the LEFT     RADIOGRAPHS RIGHT FOOT 07/02/2019  Impression:  No evidence of acute or subacute bony abnormality.   Mild soft tissue swelling in the mid/forefoot.   Mild midfoot degenerative changes.  KRISTEN GOMES MD    ============================================================    ASSESSMENT:  (M67.471) Ganglion cyst of right foot  (primary encounter diagnosis)    (L84) Callus of foot    (M21.41,  M21.42) Pes planus of both feet    (M20.41,  M20.42) Acquired hammer toe deformity of lesser toe of both feet    (M21.6X1) Gastrocnemius equinus of right lower extremity    (E11.9) Diabetes mellitus type 2, noninsulin dependent (H)    (L60.3) Onychodystrophy    (M21.70) Acquired unequal limb length      PLAN:  -Patient evaluated and examined. Treatment options discussed with no educational barriers noted.  -Patient's soft tissue mass is not a wart. It has a mild callus over the surface, but it is consistent with the clinical appearance of a ganglion cyst with a mild blister component.   ---Mildly pared the overlying hyperkeratotic lesion, but the deeper component is consistent with a cyst  ---Discussed biomechanics with the patient. She appears to have a limb length discrepency with a RIGHT gastroc equinus. In combination with her hammertoes, this is applying additional pressure to the RIGHT plantar forefoot with pinching of the MTPJs which has likely caused the development of the blister. It will continue to return as  long as the foot deformity is present.   -Orthotist referral for DM shoes and inserts with a mild LEFT heel lift, bilateral met pads, and RIGHT foot offloading of the cyst on the RIGHT plantar 4th MTPJ. Patient in agreement with this plan.  -Patient in agreement with the above treatment plan and all of patient's questions were answered.      RTC three months to evaluate RIGHT plantar cyst pain post DM shoes and offloading inserts        Charo Ramos DPM    Again, thank you for allowing me to participate in the care of your patient.        Sincerely,        Charo Ramos DPM

## 2019-08-20 NOTE — NURSING NOTE
"Chief Complaint   Patient presents with     Consult     referred by Dr Pino for right foot mass       Initial BP (!) 171/96 (BP Location: Left arm, Cuff Size: Adult Regular)   Pulse 90   Temp 97.8  F (36.6  C) (Tympanic)   Resp 16   Ht 1.549 m (5' 1\")   Wt 89.8 kg (198 lb)   SpO2 98%   BMI 37.41 kg/m   Estimated body mass index is 37.41 kg/m  as calculated from the following:    Height as of this encounter: 1.549 m (5' 1\").    Weight as of this encounter: 89.8 kg (198 lb).  Medication Reconciliation: complete    "

## 2019-08-20 NOTE — PROGRESS NOTES
"Chief complaint: Patient presents with:  Consult: referred by Dr Pino for right foot mass      History of Present Illness: This 48 year old NIDDM female is seen at the request of Dr. Pino for evaluation and suggestions of management of a RIGHT foot mass. The mass developed on the RIGHT plantar foot around June, 2019. She saw Heather Mtz who told her it was a callus and that it would go away. She then went back ot her PCP, Dr. Pino, in July, 2019, and he thought it was wart. The mass has developed a blood blister appearance. She wondered if this was a blister or a wart or a callus. This past Saturday, 08/17/2019, the blister seemed to pop and deflate, but there was no drainage on the bandage she was wearing. It is no longer painful, but it tends to hurt more when she is walking on it more. Last HbA1C was 6.5% on 08/07/2019.    Patient has a h/o a LEFT Achilles rupture and repair in 2009. She has had no concerns since that time.     BP (!) 171/96 (BP Location: Left arm, Cuff Size: Adult Regular)   Pulse 90   Temp 97.8  F (36.6  C) (Tympanic)   Resp 16   Ht 1.549 m (5' 1\")   Wt 89.8 kg (198 lb)   SpO2 98%   BMI 37.41 kg/m      Patient Active Problem List   Diagnosis     Hypothyroidism, acquired     Asthma, mild intermittent     Allergic rhinitis due to pollen     HTN (hypertension)     Nontoxic uninodular goiter     Obesity     Type 2 diabetes mellitus without complication, without long-term current use of insulin (H)     Achilles tendon tear     Asthma     Chronic lymphocytic thyroiditis     Hypertension     Adjustment disorder with anxious mood       Past Surgical History:   Procedure Laterality Date     CHOLECYSTECTOMY  1996     eye surgery for strabismus at ages 2 & 4         Current Outpatient Medications   Medication     albuterol (2.5 MG/3ML) 0.083% nebulizer solution     albuterol (PROAIR HFA) 108 (90 Base) MCG/ACT inhaler     amLODIPine (NORVASC) 10 MG tablet     blood glucose monitoring " (SOFTCLIX) lancets     CONTOUR NEXT TEST test strip     dulaglutide (TRULICITY) 0.75 MG/0.5ML pen     fluticasone (FLONASE) 50 MCG/ACT nasal spray     IBUPROFEN PO     levothyroxine (SYNTHROID/LEVOTHROID) 175 MCG tablet     lisinopril (PRINIVIL/ZESTRIL) 10 MG tablet     metFORMIN (GLUCOPHAGE) 500 MG tablet     metoprolol succinate ER (TOPROL-XL) 100 MG 24 hr tablet     sitagliptin (JANUVIA) 25 MG tablet     No current facility-administered medications for this visit.           Allergies   Allergen Reactions     Amoxicillin Trihydrate Itching     Augmentin       Clavulanic Acid Potassium Itching     Augmentin       Levaquin [Levofloxacin] Swelling     Penicillins Hives       Family History   Problem Relation Age of Onset     Diabetes Father      Other - See Comments Sister         endometriosis     Allergies Sister         environmental     Other - See Comments Mother         fibromyalgia and endometreosis     Heart Disease Mother         murmer     Allergies Mother         environmental     Hypertension Brother      Allergies Brother         environmental     Asthma No family hx of      Thyroid Disease No family hx of        Social History     Socioeconomic History     Marital status:      Spouse name: None     Number of children: None     Years of education: None     Highest education level: None   Occupational History     Occupation: [employer]     Comment: full-time   Social Needs     Financial resource strain: None     Food insecurity:     Worry: None     Inability: None     Transportation needs:     Medical: None     Non-medical: None   Tobacco Use     Smoking status: Never Smoker     Smokeless tobacco: Never Used     Tobacco comment: no passive exposure   Substance and Sexual Activity     Alcohol use: Yes     Alcohol/week: 0.0 oz     Comment: socially      Drug use: No     Sexual activity: Yes     Partners: Male   Lifestyle     Physical activity:     Days per week: None     Minutes per session:  None     Stress: None   Relationships     Social connections:     Talks on phone: None     Gets together: None     Attends Je[employer] witness service: None     Active member of club or organization: None     Attends meetings of clubs or organizations: None     Relationship status: None     Intimate partner violence:     Fear of current or ex partner: None     Emotionally abused: None     Physically abused: None     Forced sexual activity: None   Other Topics Concern      Service No     Blood Transfusions Yes     Comment: Permits if needed     Caffeine Concern No     Occupational Exposure Not Asked     Hobby Hazards Not Asked     Sleep Concern Not Asked     Stress Concern Not Asked     Weight Concern Not Asked     Special Diet Not Asked     Back Care Not Asked     Exercise Not Asked     Bike Helmet Not Asked     Seat Belt Yes     Self-Exams Yes     Parent/sibling w/ CABG, MI or angioplasty before 65F 55M? No   Social History Narrative     None       ROS: 10 point ROS neg other than the symptoms noted above in the HPI.  EXAM  Constitutional: healthy, alert and no distress    Psychiatric: mentation appears normal and affect normal/bright    VASCULAR:  -Dorsalis pedis pulse +2/4 b/l  -Posterior tibial pulse +1/4 b/l  -Capillary refill time < 3 seconds to b/l hallux  NEURO:  -Protective sensation intact with SWM +10/10 RIGHT and +10/10 LEFT on 08/21/2019  -Light touch sensation intact to b/l plantar forefoot  DERM:  -Soft tissue mass, fluid-filled, soft and mobile, superficial, mild purple discoloration at the base of the cyst  -Skin temperature, texture and turgor WNL b/l  -Toenails mildly elongated, thickened, dystrophic and discolored x 10  MSK:  -Moderate decrease in arch height while patient is NWB  -DORSIFLEXION contracture to MTPJ 2-5 b/l with flexion contracture to PIPJ of digits 2-5 b/l   -Pain on palpation to RIGHT plantar 4th plantar MTPJ  -Muscle strength of ankles +5/5 for dorsiflexion, plantarflexion,  ABDUction and ADDuction b/l  -Ankle joint passive ROM within normal limits except for dorsiflexion:    Dorsiflexion, RIGHT Straight knee -5 to 0 degrees    Dorsiflexion, LEFT Straight knee +5 to +10 degrees  -RIGHT foot medial malleolus is 0.5cm longer than the LEFT     RADIOGRAPHS RIGHT FOOT 07/02/2019  Impression:  No evidence of acute or subacute bony abnormality.   Mild soft tissue swelling in the mid/forefoot.   Mild midfoot degenerative changes.  KRISTEN GOMES MD    ============================================================    ASSESSMENT:  (M67.471) Ganglion cyst of right foot  (primary encounter diagnosis)    (L84) Callus of foot    (M21.41,  M21.42) Pes planus of both feet    (M20.41,  M20.42) Acquired hammer toe deformity of lesser toe of both feet    (M21.6X1) Gastrocnemius equinus of right lower extremity    (E11.9) Diabetes mellitus type 2, noninsulin dependent (H)    (L60.3) Onychodystrophy    (M21.70) Acquired unequal limb length      PLAN:  -Patient evaluated and examined. Treatment options discussed with no educational barriers noted.  -Patient's soft tissue mass is not a wart. It has a mild callus over the surface, but it is consistent with the clinical appearance of a ganglion cyst with a mild blister component.   ---Mildly pared the overlying hyperkeratotic lesion, but the deeper component is consistent with a cyst  ---Discussed biomechanics with the patient. She appears to have a limb length discrepency with a RIGHT gastroc equinus. In combination with her hammertoes, this is applying additional pressure to the RIGHT plantar forefoot with pinching of the MTPJs which has likely caused the development of the blister. It will continue to return as long as the foot deformity is present.   -Orthotist referral for DM shoes and inserts with a mild LEFT heel lift, bilateral met pads, and RIGHT foot offloading of the cyst on the RIGHT plantar 4th MTPJ. Patient in agreement with this plan.  -Patient  in agreement with the above treatment plan and all of patient's questions were answered.      RTC three months to evaluate RIGHT plantar cyst pain post DM shoes and offloading inserts        Charo Ramos DPM

## 2019-08-28 ENCOUNTER — HOSPITAL ENCOUNTER (OUTPATIENT)
Dept: EDUCATION SERVICES | Facility: HOSPITAL | Age: 49
Discharge: HOME | End: 2019-08-28
Attending: FAMILY MEDICINE | Admitting: FAMILY MEDICINE
Payer: COMMERCIAL

## 2019-08-28 VITALS
OXYGEN SATURATION: 99 % | BODY MASS INDEX: 37.7 KG/M2 | HEIGHT: 61 IN | RESPIRATION RATE: 16 BRPM | DIASTOLIC BLOOD PRESSURE: 87 MMHG | WEIGHT: 199.7 LBS | HEART RATE: 87 BPM | SYSTOLIC BLOOD PRESSURE: 150 MMHG

## 2019-08-28 DIAGNOSIS — E11.9 TYPE 2 DIABETES MELLITUS WITHOUT COMPLICATION, WITHOUT LONG-TERM CURRENT USE OF INSULIN (H): ICD-10-CM

## 2019-08-28 PROCEDURE — G0108 DIAB MANAGE TRN  PER INDIV: HCPCS

## 2019-08-28 ASSESSMENT — MIFFLIN-ST. JEOR: SCORE: 1473.21

## 2019-08-28 ASSESSMENT — PAIN SCALES - GENERAL: PAINLEVEL: NO PAIN (0)

## 2019-08-28 NOTE — PROGRESS NOTES
"Diabetes Self-Management Education & Support    Diabetes Education Self Management & Training    SUBJECTIVE/OBJECTIVE:  Presents for: Individual review  Accompanied by: Self  Diabetes education in the past 24mo: Yes  Focus of Visit: Taking Medication  Diabetes type: Type 2  Disease course: Improving  Diabetes management related comments/concerns: Trulicity not covered. Januvia prescribed in it's place  Transportation concerns: No  Other concerns:: None  Cultural Influences/Ethnic Background:  American      Diabetes Symptoms & Complications  Blurred vision: No  Fatigue: No  Neuropathy: No  Foot ulcerations: No  Polydipsia: No  Polyphagia: No  Polyuria: No  Visual change: No  Weakness: No  Weight loss: No  Slow healing wounds: No  Recent Infection(s): No  Symptom course: Stable  Weight trend: Stable  Autonomic neuropathy: No  CVA: No  Heart disease: No  Nephropathy: No  Peripheral neuropathy: No  Peripheral Vascular Disease: No  Retinopathy: No  Sexual dysfunction: No    Patient Problem List and Family Medical History reviewed for relevant medical history, current medical status, and diabetes risk factors.    Vitals:  /87   Pulse 87   Resp 16   Ht 1.549 m (5' 1\")   Wt 90.6 kg (199 lb 11.2 oz)   SpO2 99%   BMI 37.73 kg/m    Estimated body mass index is 37.73 kg/m  as calculated from the following:    Height as of this encounter: 1.549 m (5' 1\").    Weight as of this encounter: 90.6 kg (199 lb 11.2 oz).   Last 3 BP:   BP Readings from Last 3 Encounters:   08/28/19 150/87   08/20/19 (!) 171/96   08/07/19 130/70       History   Smoking Status     Never Smoker   Smokeless Tobacco     Never Used     Comment: no passive exposure       Labs:  Lab Results   Component Value Date    A1C 6.5 08/07/2019     Lab Results   Component Value Date     06/25/2018     Lab Results   Component Value Date    LDL 78 04/08/2019     HDL Cholesterol   Date Value Ref Range Status   04/08/2019 73 >49 mg/dL Final   ]  GFR " Estimate   Date Value Ref Range Status   2019 >90 >60 mL/min/[1.73_m2] Final     GFR Estimate If Black   Date Value Ref Range Status   2019 >90 >60 mL/min/[1.73_m2] Final     Lab Results   Component Value Date    CR 0.65 2018     No results found for: MICROALBUMIN    Healthy Eating  Healthy Eating Assessed Today: No  Cultural/Jain diet restrictions?: No  Meal planning: Carbohydrate counting, Heart healthy, Low salt, Smaller portions  Meals include: Breakfast, Lunch, Dinner  Beverages: Water, Diet soda  Has patient met with a dietitian in the past?: No    Being Active  Being Active Assessed Today: No  How intense was your typical exercise? : Moderate (like brisk walking)  Barrier to exercise: None    Monitoring  Monitoring Assessed Today: Yes  Did patient bring glucose meter to appointment? : Yes  Blood Glucose Meter: Accu-check  Home Glucose (Sugar) Monitorin-2 times per day  Blood glucose trend: Decreasing steadily  Low Glucose Range (mg/dL):   High Glucose Range (mg/dL): 140-180  Overall Range (mg/dL): 110-130        Taking Medications  Diabetes Medication(s)     Biguanides       metFORMIN (GLUCOPHAGE) 500 MG tablet    Take 2 tablets (1,000 mg) by mouth 2 times daily (with meals)    Dipeptidyl Peptidase-4 (DPP-4) Inhibitors       sitagliptin (JANUVIA) 25 MG tablet    Take 1 tablet (25 mg) by mouth daily          Taking Medication Assessed Today: Yes  Current Treatments: Oral Agent (dual therapy)  Problems taking diabetes medications regularly?: No  Diabetes medication side effects?: No  Treatment Compliance: All of the time    Problem Solving  Problem Solving Assessed Today: Yes  Hypoglycemia Frequency: Rarely  Hypoglycemia Treatment: Glucose (tablets or gel), Candy  Patient carries a carbohydrate source: No  Medical alert: No  Severe weather/disaster plan for diabetes management?: No  DKA prevention plan?: No  Sick day plan for diabetes management?: (P) No    Hypoglycemia  symptoms  Confusion: No  Dizziness or Light-Headedness: No  Headaches: No  Hunger: Yes  Mood changes: No  Nervousness/Anxiety: No  Sleepiness: No  Speech difficulty: No  Sweats: No  Tremors: No    Hypoglycemia Complications  Blackouts: No  Hospitalization: No  Nocturnal hypoglycemia: No  Required assistance: No  Required glucagon injection: No  Seizures: No    Reducing Risks  Reducing Risks Assessed Today: Yes  CAD Risks: Family history, Hypertension, Obesity, Stress  Has dilated eye exam at least once a year?: No  Sees dentist every 6 months?: Yes  Sees podiatrist (foot doctor)?: Yes    Healthy Coping  Healthy Coping Assessed Today: Yes  Emotional response to diabetes: Confidence diabetes can be controlled  Informal Support system:: Children, Family, Parent, Spouse  Stage of change: ACTION (Actively working towards change)  Difficulty affording diabetes management supplies?: No  Support resources: None  Patient Activation Measure Survey Score:  JAIME Score (Last Two) 2018   JAIME Raw Score 40   Activation Score 100   JAIME Level 4       ASSESSMENT:  Edith is here to discuss recent diabetes medication prescription. She had been expecting to be taking and different medication and has several questions.    Readings range as follows: fastin, 123-166 and post-supper: 150    Patient's most recent   Lab Results   Component Value Date    A1C 6.5 2019    is meeting goal of <7.0    INTERVENTION:   Diabetes knowledge and skills assessment:     Patient is knowledgeable in diabetes management concepts related to: Healthy Eating, Being Active, Monitoring and Taking Medication    Patient needs further education on the following diabetes management concepts: Taking Medication    Based on learning assessment above, most appropriate setting for further diabetes education would be: Individual setting.    Education provided today on:  AADE Self-Care Behaviors:  Taking Medication: action of prescribed medication, side  effects of prescribed medications and when to take medications    Opportunities for ongoing education and support in diabetes-self management were discussed.    Pt verbalized understanding of concepts discussed and recommendations provided today.       Education Materials Provided:  No new materials provided today    PLAN:  See Patient Instructions for co-developed, patient-stated behavior change goals.  Continue with Januvia 25 mg daily.    I will call you in 2 weeks for BG readings. If readings remain the same, we will increase Januvia to 100 mg daily.    After one month, I will ask you to follow with Lynn Judge for BG review.  AVS printed and provided to patient today. See Follow-Up section for recommended follow-up.      Time Spent: 30 minutes  Encounter Type: Individual    Any diabetes medication dose changes were made via the CDE Protocol and Collaborative Practice Agreement with the patient's primary care provider. A copy of this encounter was shared with the provider.

## 2019-08-28 NOTE — PATIENT INSTRUCTIONS
Continue with Januvia 25 mg daily.    I will call you in 2 weeks for BG readings. If readings remain the same, we will increase Januvia to 100 mg daily.    After one month, I will ask you to follow with Lynn Judge for BG review.

## 2019-09-08 DIAGNOSIS — J30.1 SEASONAL ALLERGIC RHINITIS DUE TO POLLEN: ICD-10-CM

## 2019-09-11 RX ORDER — FLUTICASONE PROPIONATE 50 MCG
SPRAY, SUSPENSION (ML) NASAL
Qty: 16 ML | Refills: 3 | Status: SHIPPED | OUTPATIENT
Start: 2019-09-11 | End: 2019-12-02

## 2019-09-13 DIAGNOSIS — E03.9 ACQUIRED HYPOTHYROIDISM: ICD-10-CM

## 2019-09-13 RX ORDER — LEVOTHYROXINE SODIUM 175 UG/1
TABLET ORAL
Qty: 30 TABLET | Refills: 3 | Status: SHIPPED | OUTPATIENT
Start: 2019-09-13 | End: 2019-12-02

## 2019-10-07 ENCOUNTER — OFFICE VISIT (OUTPATIENT)
Dept: FAMILY MEDICINE | Facility: OTHER | Age: 49
End: 2019-10-07
Attending: PHYSICIAN ASSISTANT
Payer: COMMERCIAL

## 2019-10-07 VITALS
HEART RATE: 95 BPM | SYSTOLIC BLOOD PRESSURE: 154 MMHG | OXYGEN SATURATION: 98 % | BODY MASS INDEX: 37.57 KG/M2 | WEIGHT: 199 LBS | TEMPERATURE: 98.5 F | HEIGHT: 61 IN | DIASTOLIC BLOOD PRESSURE: 92 MMHG

## 2019-10-07 DIAGNOSIS — J01.01 ACUTE RECURRENT MAXILLARY SINUSITIS: Primary | ICD-10-CM

## 2019-10-07 PROCEDURE — G0463 HOSPITAL OUTPT CLINIC VISIT: HCPCS | Performed by: COUNSELOR

## 2019-10-07 PROCEDURE — 99213 OFFICE O/P EST LOW 20 MIN: CPT | Performed by: PHYSICIAN ASSISTANT

## 2019-10-07 RX ORDER — BENZONATATE 200 MG/1
200 CAPSULE ORAL 3 TIMES DAILY PRN
Qty: 45 CAPSULE | Refills: 1 | Status: SHIPPED | OUTPATIENT
Start: 2019-10-07 | End: 2019-12-02

## 2019-10-07 RX ORDER — SULFAMETHOXAZOLE AND TRIMETHOPRIM 400; 80 MG/1; MG/1
1 TABLET ORAL 2 TIMES DAILY
Qty: 20 TABLET | Refills: 0 | Status: SHIPPED | OUTPATIENT
Start: 2019-10-07 | End: 2019-12-02

## 2019-10-07 ASSESSMENT — PAIN SCALES - GENERAL: PAINLEVEL: NO PAIN (0)

## 2019-10-07 ASSESSMENT — MIFFLIN-ST. JEOR: SCORE: 1470.04

## 2019-10-07 NOTE — PROGRESS NOTES
Subjective     Edith Doty is a 48 year old female who presents to clinic today for the following health issues:    HPI   RESPIRATORY SYMPTOMS      Duration: Almost Weeks     Description  facial pain/pressure, cough and fever    Severity: moderate-severe    Accompanying signs and symptoms: None    History (predisposing factors):  Bronchitis     Precipitating or alleviating factors: None    Therapies tried and outcome:  OTC cough/cold medication         Patient Active Problem List   Diagnosis     Hypothyroidism, acquired     Asthma, mild intermittent     Allergic rhinitis due to pollen     HTN (hypertension)     Nontoxic uninodular goiter     Obesity     Type 2 diabetes mellitus without complication, without long-term current use of insulin (H)     Achilles tendon tear     Asthma     Chronic lymphocytic thyroiditis     Hypertension     Adjustment disorder with anxious mood     Past Surgical History:   Procedure Laterality Date     CHOLECYSTECTOMY  1996     eye surgery for strabismus at ages 2 & 4         Social History     Tobacco Use     Smoking status: Never Smoker     Smokeless tobacco: Never Used     Tobacco comment: no passive exposure   Substance Use Topics     Alcohol use: Yes     Alcohol/week: 0.0 standard drinks     Comment: socially      Family History   Problem Relation Age of Onset     Diabetes Father      Other - See Comments Sister         endometriosis     Allergies Sister         environmental     Other - See Comments Mother         fibromyalgia and endometreosis     Heart Disease Mother         murmer     Allergies Mother         environmental     Hypertension Brother      Allergies Brother         environmental     Asthma No family hx of      Thyroid Disease No family hx of          Current Outpatient Medications   Medication Sig Dispense Refill     albuterol (2.5 MG/3ML) 0.083% nebulizer solution USE 1 VIAL BY NEBULIZATION EVERY 6 HOURS AS NEEDED FOR SHORTNESS OF BREATH / DYSPNEA OR WHEEZING 90  mL 3     albuterol (PROAIR HFA) 108 (90 Base) MCG/ACT inhaler Inhale 2 puffs into the lungs every 6 hours 18 g 3     amLODIPine (NORVASC) 10 MG tablet Take 1 tablet (10 mg) by mouth daily 90 tablet 2     blood glucose monitoring (SOFTCLIX) lancets TEST TWICE DAILY 100 each 10     CONTOUR NEXT TEST test strip USE TO TEST TWICE DAILY 50 each 10     fluticasone (FLONASE) 50 MCG/ACT nasal spray INSTILL 2 SPRAYS IN EACH NOSTRIL DAILY 16 mL 3     IBUPROFEN PO        levothyroxine (SYNTHROID/LEVOTHROID) 175 MCG tablet TAKE 1 TABLET(175 MCG) BY MOUTH DAILY 30 tablet 3     lisinopril (PRINIVIL/ZESTRIL) 10 MG tablet Take 1 tablet (10 mg) by mouth 2 times daily 180 tablet 3     metFORMIN (GLUCOPHAGE) 500 MG tablet Take 2 tablets (1,000 mg) by mouth 2 times daily (with meals) 120 tablet 3     metoprolol succinate ER (TOPROL-XL) 100 MG 24 hr tablet Take 1 tablet (100 mg) by mouth daily 90 tablet 1     sitagliptin (JANUVIA) 25 MG tablet Take 1 tablet (25 mg) by mouth daily 30 tablet 3     Allergies   Allergen Reactions     Amoxicillin Trihydrate Itching     Augmentin       Clavulanic Acid Potassium Itching     Augmentin       Levaquin [Levofloxacin] Swelling     Penicillins Hives     Recent Labs   Lab Test 08/07/19 06/03/19  0905 04/08/19  0924 06/25/18  0942 06/10/18  1442  08/15/17  0840 09/17/13  1039   A1C 6.5*  --  7.1* 6.5*  --    < >  --   --    LDL  --   --  78  --   --   --  63  --    HDL  --   --  73  --   --   --  52  --    TRIG  --   --  235*  --   --   --  294*  --    ALT  --   --   --   --  72*  --  39 48   CR  --   --   --  0.65 0.61  --  0.68  --    GFRESTIMATED  --  >90  --  >90 >90  --  >90  --    GFRESTBLACK  --  >90  --  >90 >90  --  >90  --    POTASSIUM  --   --   --  4.0 3.5  --  4.1  --    TSH  --   --   --  0.14*  --   --   --   --     < > = values in this interval not displayed.      BP Readings from Last 3 Encounters:   10/07/19 (!) 154/92   08/28/19 150/87   08/20/19 (!) 171/96    Wt Readings from Last  "3 Encounters:   10/07/19 90.3 kg (199 lb)   08/28/19 90.6 kg (199 lb 11.2 oz)   08/20/19 89.8 kg (198 lb)                      Reviewed and updated as needed this visit by Provider         Review of Systems   ROS COMP: Constitutional, HEENT, cardiovascular, pulmonary, gi and gu systems are negative, except as otherwise noted.      Objective    There were no vitals taken for this visit.  There is no height or weight on file to calculate BMI.  Physical Exam   GENERAL: healthy, alert and no distress  EYES: Eyes grossly normal to inspection, PERRL and conjunctivae and sclerae normal  HENT: ear canals and TM's normal, nose and mouth without ulcers or lesions. Right maxillary is very tender.  Congestion and mild improvement in breathing.    NECK: no adenopathy, no asymmetry, masses, or scars and thyroid normal to palpation  RESP: lungs clear to auscultation - no rales, rhonchi or wheezes  CV: regular rate and rhythm, normal S1 S2, no S3 or S4, no murmur, click or rub, no peripheral edema and peripheral pulses strong  ABDOMEN: soft, nontender, no hepatosplenomegaly, no masses and bowel sounds normal  SKIN: no suspicious lesions or rashes    Diagnostic Test Results:  Labs reviewed in Epic  No results found for this or any previous visit (from the past 24 hour(s)).        Assessment & Plan     1. Acute recurrent maxillary sinusitis  Has acute sinus infection. On going for a few weeks. Not better only worsening. Will give Sulfa and Tessalon. PCN allergy unsure about cephalosporin class.         BMI:   Estimated body mass index is 37.6 kg/m  as calculated from the following:    Height as of this encounter: 1.549 m (5' 1\").    Weight as of this encounter: 90.3 kg (199 lb).           See Patient Instructions    No follow-ups on file.    LALY Ortega  [facility] [place]      "

## 2019-10-07 NOTE — PATIENT INSTRUCTIONS
Thank you for choosing [facility].   I have office hours 8:00 am to 4:30 pm on Monday's, Wednesday's, Thursday's and Friday's. My nurse and I are out of the office every Tuesday.    Following your visit, when your labs and diagnostic testing have returned, I will review then and you will be contacted by my nurse.  If you are on My Chart, you can also view results there.    For refills, notify your pharmacy regarding what you need and the pharmacy will generate a refill request. Do not call my nurse as she is unable to process refill request. Please plan ahead and allow 3-5 days for refill requests.    You will generally receive a reminder call the day prior to your appointment.  If you cannot attend your appointment, please cancel your appointment with as much notice as possible.  If there is a pattern of failure to present for your appointments, I cannot provide consistent, meaningful, ongoing care for you. It is very important to me that you come in for your care, so we can best assist you with your health care needs.    IMPORTANT:  Please note that it is my standard of practice to NOT participate in prescribing ongoing requested Narcotic Analgesic therapy, and/or participate in the prescribing of other controlled substances.  My nurse and I am happy to assist you with the process of referral for alternative pain management as needed, and other treatment modalities including but not limited to:  Physical Therapy, Physical Medicine and Rehab, Counseling, Chiropractic Care, Orthopedic Care, and non-narcotic medication management.     In the event that you need to be seen for emergent concerns and I am out of office,  please see one of my colleagues for acute concerns.  You may also present to  or ER.  I appreciate the opportunity to serve you and look forward to supporting your healthcare needs in the future. Please contact me with any questions or concerns that you may  have.    Sincerely,      Linn Ortiz RN, PA-C

## 2019-10-07 NOTE — NURSING NOTE
"Chief Complaint   Patient presents with     URI       Initial BP (!) 154/92 (BP Location: Left arm, Patient Position: Sitting, Cuff Size: Adult Large)   Pulse 95   Temp 98.5  F (36.9  C) (Tympanic)   Ht 1.549 m (5' 1\")   Wt 90.3 kg (199 lb)   SpO2 98%   BMI 37.60 kg/m   Estimated body mass index is 37.6 kg/m  as calculated from the following:    Height as of this encounter: 1.549 m (5' 1\").    Weight as of this encounter: 90.3 kg (199 lb).  Medication Reconciliation: complete     Aurora Cuellar LPN    "

## 2019-10-27 DIAGNOSIS — E11.65 TYPE 2 DIABETES MELLITUS WITH HYPERGLYCEMIA, WITHOUT LONG-TERM CURRENT USE OF INSULIN (H): ICD-10-CM

## 2019-10-31 ENCOUNTER — TRANSFERRED RECORDS (OUTPATIENT)
Dept: HEALTH INFORMATION MANAGEMENT | Facility: CLINIC | Age: 49
End: 2019-10-31

## 2019-10-31 LAB — RETINOPATHY: NEGATIVE

## 2019-11-21 ENCOUNTER — OFFICE VISIT (OUTPATIENT)
Dept: PODIATRY | Facility: OTHER | Age: 49
End: 2019-11-21
Attending: PODIATRIST
Payer: COMMERCIAL

## 2019-11-21 VITALS
SYSTOLIC BLOOD PRESSURE: 154 MMHG | HEART RATE: 75 BPM | RESPIRATION RATE: 16 BRPM | TEMPERATURE: 97.7 F | DIASTOLIC BLOOD PRESSURE: 84 MMHG

## 2019-11-21 DIAGNOSIS — E11.9 DIABETES MELLITUS TYPE 2, NONINSULIN DEPENDENT (H): ICD-10-CM

## 2019-11-21 DIAGNOSIS — L84 CALLUS OF FOOT: ICD-10-CM

## 2019-11-21 DIAGNOSIS — M20.41 ACQUIRED HAMMER TOE DEFORMITY OF LESSER TOE OF BOTH FEET: ICD-10-CM

## 2019-11-21 DIAGNOSIS — M62.461 GASTROCNEMIUS EQUINUS OF RIGHT LOWER EXTREMITY: ICD-10-CM

## 2019-11-21 DIAGNOSIS — L60.3 ONYCHODYSTROPHY: ICD-10-CM

## 2019-11-21 DIAGNOSIS — M21.42 PES PLANUS OF BOTH FEET: ICD-10-CM

## 2019-11-21 DIAGNOSIS — M67.471 GANGLION CYST OF RIGHT FOOT: Primary | ICD-10-CM

## 2019-11-21 DIAGNOSIS — M21.70 ACQUIRED UNEQUAL LIMB LENGTH: ICD-10-CM

## 2019-11-21 DIAGNOSIS — M21.41 PES PLANUS OF BOTH FEET: ICD-10-CM

## 2019-11-21 DIAGNOSIS — M20.42 ACQUIRED HAMMER TOE DEFORMITY OF LESSER TOE OF BOTH FEET: ICD-10-CM

## 2019-11-21 PROCEDURE — G0463 HOSPITAL OUTPT CLINIC VISIT: HCPCS | Mod: 25

## 2019-11-21 PROCEDURE — 99213 OFFICE O/P EST LOW 20 MIN: CPT | Performed by: PODIATRIST

## 2019-11-21 PROCEDURE — G0463 HOSPITAL OUTPT CLINIC VISIT: HCPCS

## 2019-11-21 ASSESSMENT — PAIN SCALES - GENERAL: PAINLEVEL: NO PAIN (0)

## 2019-11-21 NOTE — NURSING NOTE
"Chief Complaint   Patient presents with     Musculoskeletal Problem     foot mass       Initial BP (!) 171/99 (BP Location: Left arm, Patient Position: Sitting, Cuff Size: Adult Regular)   Pulse 75   Temp 97.7  F (36.5  C) (Tympanic)   Resp 16  Estimated body mass index is 37.6 kg/m  as calculated from the following:    Height as of 10/7/19: 1.549 m (5' 1\").    Weight as of 10/7/19: 90.3 kg (199 lb).  Medication Reconciliation: complete  Prisca Benavides LPN  "

## 2019-11-21 NOTE — PROGRESS NOTES
Chief complaint: Patient presents with:  Musculoskeletal Problem: foot mass      History of Present Illness: This 48 year old NIDDM female is seen for follow-up management of a RIGHT foot mass on the RIGHT plantar surface of her foot. She was fit foot DM shoes to offload the area, but she will not receive the shoes until 12/03/2019. However, the mass on her foot has significantly decreased in size and is no longer painful. She has no concerns with her feet. No further pedal complaints today.     History of foot mass:  The mass developed on the RIGHT plantar foot around June, 2019. She saw Heather Mtz who told her it was a callus and that it would go away. She then went back ot her PCP, Dr. Pino, in July, 2019, and he thought it was wart. The mass developed a blood blister appearance. She wondered if this was a blister, a wart or a callus. On 08/17/2019, the blister seemed to pop and deflate, but there was no drainage on the bandage she was wearing. It was no longer painful, but it hurt more when she was walking on it more. Last HbA1C was 6.5% on 08/07/2019.    Patient has a h/o a LEFT Achilles rupture and repair in 2009. She has had no concerns since that time.     BP (!) 171/99 (BP Location: Left arm, Patient Position: Sitting, Cuff Size: Adult Regular)   Pulse 75   Temp 97.7  F (36.5  C) (Tympanic)   Resp 16     Patient Active Problem List   Diagnosis     Hypothyroidism, acquired     Asthma, mild intermittent     Allergic rhinitis due to pollen     HTN (hypertension)     Nontoxic uninodular goiter     Obesity     Type 2 diabetes mellitus without complication, without long-term current use of insulin (H)     Achilles tendon tear     Asthma     Chronic lymphocytic thyroiditis     Hypertension     Adjustment disorder with anxious mood       Past Surgical History:   Procedure Laterality Date     CHOLECYSTECTOMY  1996     eye surgery for strabismus at ages 2 & 4         Current Outpatient Medications    Medication     albuterol (2.5 MG/3ML) 0.083% nebulizer solution     albuterol (PROAIR HFA) 108 (90 Base) MCG/ACT inhaler     amLODIPine (NORVASC) 10 MG tablet     benzonatate (TESSALON) 200 MG capsule     blood glucose monitoring (SOFTCLIX) lancets     CONTOUR NEXT TEST test strip     fluticasone (FLONASE) 50 MCG/ACT nasal spray     IBUPROFEN PO     levothyroxine (SYNTHROID/LEVOTHROID) 175 MCG tablet     lisinopril (PRINIVIL/ZESTRIL) 10 MG tablet     metFORMIN (GLUCOPHAGE) 500 MG tablet     metoprolol succinate ER (TOPROL-XL) 100 MG 24 hr tablet     sitagliptin (JANUVIA) 25 MG tablet     sulfamethoxazole-trimethoprim (BACTRIM/SEPTRA) 400-80 MG tablet     No current facility-administered medications for this visit.           Allergies   Allergen Reactions     Amoxicillin Trihydrate Itching     Augmentin       Clavulanic Acid Potassium Itching     Augmentin       Levaquin [Levofloxacin] Swelling     Penicillins Hives       Family History   Problem Relation Age of Onset     Diabetes Father      Other - See Comments Sister         endometriosis     Allergies Sister         environmental     Other - See Comments Mother         fibromyalgia and endometreosis     Heart Disease Mother         murmer     Allergies Mother         environmental     Hypertension Brother      Allergies Brother         environmental     Asthma No family hx of      Thyroid Disease No family hx of        Social History     Socioeconomic History     Marital status:      Spouse name: None     Number of children: None     Years of education: None     Highest education level: None   Occupational History     Occupation: [employer]     Comment: full-time   Social Needs     Financial resource strain: None     Food insecurity:     Worry: None     Inability: None     Transportation needs:     Medical: None     Non-medical: None   Tobacco Use     Smoking status: Never Smoker     Smokeless tobacco: Never Used     Tobacco comment: no passive  exposure   Substance and Sexual Activity     Alcohol use: Yes     Alcohol/week: 0.0 oz     Comment: socially      Drug use: No     Sexual activity: Yes     Partners: Male   Lifestyle     Physical activity:     Days per week: None     Minutes per session: None     Stress: None   Relationships     Social connections:     Talks on phone: None     Gets together: None     Attends Latter day service: None     Active member of club or organization: None     Attends meetings of clubs or organizations: None     Relationship status: None     Intimate partner violence:     Fear of current or ex partner: None     Emotionally abused: None     Physically abused: None     Forced sexual activity: None   Other Topics Concern      Service No     Blood Transfusions Yes     Comment: Permits if needed     Caffeine Concern No     Occupational Exposure Not Asked     Hobby Hazards Not Asked     Sleep Concern Not Asked     Stress Concern Not Asked     Weight Concern Not Asked     Special Diet Not Asked     Back Care Not Asked     Exercise Not Asked     Bike Helmet Not Asked     Seat Belt Yes     Self-Exams Yes     Parent/sibling w/ CABG, MI or angioplasty before 65F 55M? No   Social History Narrative     None       ROS: 10 point ROS neg other than the symptoms noted above in the HPI.  EXAM  Constitutional: healthy, alert and no distress    Psychiatric: mentation appears normal and affect normal/bright    VASCULAR:  -Dorsalis pedis pulse +2/4 b/l  -Posterior tibial pulse +1/4 b/l  -Capillary refill time < 3 seconds to b/l hallux  NEURO:  -Protective sensation intact with SWM +10/10 RIGHT and +10/10 LEFT on 08/21/2019  -Light touch sensation intact to b/l plantar forefoot  DERM:  -Soft tissue mass to the RIGHT plantar 4th MTPJ, fluid-filled, soft and mobile, transparent, superficial, minimal-to-no purple discoloration at the base of the cyst estimated to measure 0.3cm x 0.3cm x flush with the skin  -Skin temperature, texture and turgor  WNL b/l  -Toenails mildly elongated, thickened, dystrophic and discolored x 10  MSK:  -Moderate decrease in arch height while patient is NWB  -DORSIFLEXION contracture to MTPJ 2-5 b/l with flexion contracture to PIPJ of digits 2-5 b/l   -Pain on palpation to RIGHT plantar 4th plantar MTPJ  -Muscle strength of ankles +5/5 for dorsiflexion, plantarflexion, ABDUction and ADDuction b/l  -Ankle joint passive ROM within normal limits except for dorsiflexion:    Dorsiflexion, RIGHT Straight knee -5 to 0 degrees    Dorsiflexion, LEFT Straight knee +5 to +10 degrees  -RIGHT foot medial malleolus is 0.5cm longer than the LEFT     RADIOGRAPHS RIGHT FOOT 07/02/2019  Impression:  No evidence of acute or subacute bony abnormality.   Mild soft tissue swelling in the mid/forefoot.   Mild midfoot degenerative changes.  KRISTEN GOMES MD    ============================================================    ASSESSMENT:  (M67.471) Ganglion cyst of right foot  (primary encounter diagnosis)    (L84) Callus of foot    (M21.41,  M21.42) Pes planus of both feet    (M20.41,  M20.42) Acquired hammer toe deformity of lesser toe of both feet    (M21.6X1) Gastrocnemius equinus of right lower extremity    (E11.9) Diabetes mellitus type 2, noninsulin dependent (H)    (L60.3) Onychodystrophy    (M21.70) Acquired unequal limb length      PLAN:  -Patient evaluated and examined. Treatment options discussed with no educational barriers noted.  -Patient's soft tissue mass still has a callus / ganglion appearance but has greatly decreased in size. It is no longer painful and she will be dispensed a shoe and offloading insert in two weeks to take pressure off the mass. She is instructed to monitor the callus/ganglion and return if she notices a sudden change in size, shape or color. She is in agreement with this plan.    ---Previously discussed biomechanics with the patient. She appears to have a limb length discrepency with a RIGHT gastroc equinus. In  combination with her hammertoes, this is applying additional pressure to the RIGHT plantar forefoot with pinching of the MTPJs which has likely caused the development of the blister. It will continue to return as long as the foot deformity is present.   ---Avoid shoes with a heel  -DM shoes being dispensed 12/3/2019 x2 pairs    -Patient in agreement with the above treatment plan and all of patient's questions were answered.      RTC one year (10/2020) for diabetic foot exam and shoe renewal to be dispensed in 12/2020      Charo Ramos DPM

## 2019-12-02 ENCOUNTER — OFFICE VISIT (OUTPATIENT)
Dept: FAMILY MEDICINE | Facility: OTHER | Age: 49
End: 2019-12-02
Attending: FAMILY MEDICINE
Payer: COMMERCIAL

## 2019-12-02 VITALS
OXYGEN SATURATION: 97 % | WEIGHT: 200 LBS | SYSTOLIC BLOOD PRESSURE: 136 MMHG | HEIGHT: 61 IN | TEMPERATURE: 97.9 F | HEART RATE: 87 BPM | BODY MASS INDEX: 37.76 KG/M2 | RESPIRATION RATE: 18 BRPM | DIASTOLIC BLOOD PRESSURE: 82 MMHG

## 2019-12-02 DIAGNOSIS — Z23 IMMUNIZATION DUE: ICD-10-CM

## 2019-12-02 DIAGNOSIS — J30.1 SEASONAL ALLERGIC RHINITIS DUE TO POLLEN: ICD-10-CM

## 2019-12-02 DIAGNOSIS — I10 BENIGN ESSENTIAL HYPERTENSION: ICD-10-CM

## 2019-12-02 DIAGNOSIS — E11.9 TYPE 2 DIABETES MELLITUS WITHOUT COMPLICATION, WITHOUT LONG-TERM CURRENT USE OF INSULIN (H): Primary | ICD-10-CM

## 2019-12-02 DIAGNOSIS — J45.20 MILD INTERMITTENT ASTHMA WITHOUT COMPLICATION: ICD-10-CM

## 2019-12-02 PROCEDURE — G0463 HOSPITAL OUTPT CLINIC VISIT: HCPCS | Mod: 25

## 2019-12-02 PROCEDURE — 90715 TDAP VACCINE 7 YRS/> IM: CPT | Performed by: FAMILY MEDICINE

## 2019-12-02 PROCEDURE — 99214 OFFICE O/P EST MOD 30 MIN: CPT | Mod: 25 | Performed by: FAMILY MEDICINE

## 2019-12-02 PROCEDURE — 90471 IMMUNIZATION ADMIN: CPT | Performed by: FAMILY MEDICINE

## 2019-12-02 RX ORDER — LEVOTHYROXINE SODIUM 150 UG/1
150 TABLET ORAL
COMMUNITY
Start: 2019-11-12 | End: 2019-12-12

## 2019-12-02 RX ORDER — FLUTICASONE PROPIONATE 50 MCG
SPRAY, SUSPENSION (ML) NASAL
Qty: 16 ML | Refills: 3 | Status: SHIPPED | OUTPATIENT
Start: 2019-12-02 | End: 2020-06-09

## 2019-12-02 RX ORDER — LISINOPRIL 10 MG/1
10 TABLET ORAL 2 TIMES DAILY
Qty: 180 TABLET | Refills: 3 | Status: SHIPPED | OUTPATIENT
Start: 2019-12-02 | End: 2020-09-11

## 2019-12-02 RX ORDER — ALBUTEROL SULFATE 90 UG/1
2 AEROSOL, METERED RESPIRATORY (INHALATION) EVERY 6 HOURS
Qty: 18 G | Refills: 3 | Status: SHIPPED | OUTPATIENT
Start: 2019-12-02 | End: 2020-09-11

## 2019-12-02 RX ORDER — AMLODIPINE BESYLATE 10 MG/1
10 TABLET ORAL DAILY
Qty: 90 TABLET | Refills: 2 | Status: SHIPPED | OUTPATIENT
Start: 2019-12-02 | End: 2020-09-11

## 2019-12-02 RX ORDER — METOPROLOL SUCCINATE 100 MG/1
100 TABLET, EXTENDED RELEASE ORAL DAILY
Qty: 90 TABLET | Refills: 1 | Status: SHIPPED | OUTPATIENT
Start: 2019-12-02 | End: 2020-07-21

## 2019-12-02 ASSESSMENT — MIFFLIN-ST. JEOR: SCORE: 1474.57

## 2019-12-02 ASSESSMENT — ASTHMA QUESTIONNAIRES
QUESTION_3 LAST FOUR WEEKS HOW OFTEN DID YOUR ASTHMA SYMPTOMS (WHEEZING, COUGHING, SHORTNESS OF BREATH, CHEST TIGHTNESS OR PAIN) WAKE YOU UP AT NIGHT OR EARLIER THAN USUAL IN THE MORNING: ONCE OR TWICE
QUESTION_1 LAST FOUR WEEKS HOW MUCH OF THE TIME DID YOUR ASTHMA KEEP YOU FROM GETTING AS MUCH DONE AT WORK, SCHOOL OR AT HOME: NONE OF THE TIME
QUESTION_4 LAST FOUR WEEKS HOW OFTEN HAVE YOU USED YOUR RESCUE INHALER OR NEBULIZER MEDICATION (SUCH AS ALBUTEROL): ONCE A WEEK OR LESS
ACT_TOTALSCORE: 22
QUESTION_2 LAST FOUR WEEKS HOW OFTEN HAVE YOU HAD SHORTNESS OF BREATH: ONCE OR TWICE A WEEK
QUESTION_5 LAST FOUR WEEKS HOW WOULD YOU RATE YOUR ASTHMA CONTROL: COMPLETELY CONTROLLED

## 2019-12-02 ASSESSMENT — PAIN SCALES - GENERAL: PAINLEVEL: NO PAIN (0)

## 2019-12-02 NOTE — NURSING NOTE
"Chief Complaint   Patient presents with     Diabetes     Hypertension       Initial /82 (BP Location: Left arm, Patient Position: Sitting, Cuff Size: Adult Large)   Pulse 87   Temp 97.9  F (36.6  C) (Tympanic)   Resp 18   Ht 1.549 m (5' 1\")   Wt 90.7 kg (200 lb)   SpO2 97%   BMI 37.79 kg/m   Estimated body mass index is 37.79 kg/m  as calculated from the following:    Height as of this encounter: 1.549 m (5' 1\").    Weight as of this encounter: 90.7 kg (200 lb).  Medication Reconciliation: complete  Serenity Clinton LPN  "

## 2019-12-02 NOTE — PROGRESS NOTES
SUBJECTIVE:   Edith Doty is a 47 year old female who presents to clinic today for the following health issues:    Diabetes Follow-up    Patient is checking blood sugars: once daily.  Results are as follows:         am - 130's    Diabetic concerns: None     Symptoms of hypoglycemia (low blood sugar): shaky, dizzy, weak     Paresthesias (numbness or burning in feet) or sores: No     Date of last diabetic eye exam: prior    BP Readings from Last 2 Encounters:   12/02/19 136/82   11/21/19 (!) 154/84     Hemoglobin A1C (%)   Date Value   08/07/2019 6.5 (A)   04/08/2019 7.1 (H)     LDL Cholesterol Calculated (mg/dL)   Date Value   04/08/2019 78   08/15/2017 63     Hypertension Follow-up      Outpatient blood pressures are being checked at home.  Results are mildly elevated.    Low Salt Diet: no added salt      Amount of exercise or physical activity: None    Problems taking medications regularly: No    Medication side effects: none    Diet: diabetic    Problem list and histories reviewed & adjusted, as indicated.  Additional history: as documented    Patient Active Problem List   Diagnosis     Hypothyroidism, acquired     Asthma, mild intermittent     Allergic rhinitis due to pollen     HTN (hypertension)     Nontoxic uninodular goiter     Obesity     Type 2 diabetes mellitus without complication, without long-term current use of insulin (H)     Achilles tendon tear     Asthma     Chronic lymphocytic thyroiditis     Hypertension     Adjustment disorder with anxious mood     Past Surgical History:   Procedure Laterality Date     CHOLECYSTECTOMY  1996     eye surgery for strabismus at ages 2 & 4         Social History     Tobacco Use     Smoking status: Never Smoker     Smokeless tobacco: Never Used     Tobacco comment: no passive exposure   Substance Use Topics     Alcohol use: Yes     Alcohol/week: 0.0 standard drinks     Comment: socially      Family History   Problem Relation Age of Onset     Diabetes Father       Other - See Comments Sister         endometriosis     Allergies Sister         environmental     Other - See Comments Mother         fibromyalgia and endometreosis     Heart Disease Mother         murmer     Allergies Mother         environmental     Hypertension Brother      Allergies Brother         environmental     Asthma No family hx of      Thyroid Disease No family hx of          Current Outpatient Medications   Medication Sig Dispense Refill     albuterol (2.5 MG/3ML) 0.083% nebulizer solution USE 1 VIAL BY NEBULIZATION EVERY 6 HOURS AS NEEDED FOR SHORTNESS OF BREATH / DYSPNEA OR WHEEZING 90 mL 3     albuterol (PROAIR HFA) 108 (90 Base) MCG/ACT inhaler Inhale 2 puffs into the lungs every 6 hours 18 g 3     amLODIPine (NORVASC) 10 MG tablet Take 1 tablet (10 mg) by mouth daily 90 tablet 2     blood glucose monitoring (SOFTCLIX) lancets TEST TWICE DAILY 100 each 10     CONTOUR NEXT TEST test strip USE TO TEST TWICE DAILY 50 strip 3     fluticasone (FLONASE) 50 MCG/ACT nasal spray Instill 2 sprays into each nostril daily 16 mL 3     levothyroxine (SYNTHROID/LEVOTHROID) 150 MCG tablet Take 150 mcg by mouth       lisinopril (PRINIVIL/ZESTRIL) 10 MG tablet Take 1 tablet (10 mg) by mouth 2 times daily 180 tablet 3     metFORMIN (GLUCOPHAGE) 500 MG tablet Take 2 tablets (1,000 mg) by mouth 2 times daily (with meals) 120 tablet 3     metoprolol succinate ER (TOPROL-XL) 100 MG 24 hr tablet Take 1 tablet (100 mg) by mouth daily 90 tablet 1     Allergies   Allergen Reactions     Amoxicillin Trihydrate Itching     Augmentin       Clavulanic Acid Potassium Itching     Augmentin       Levaquin [Levofloxacin] Swelling     Penicillins Hives       Reviewed and updated as needed this visit by clinical staff  Tobacco  Allergies  Meds  Problems  Med Hx  Surg Hx  Fam Hx       Reviewed and updated as needed this visit by Provider         ROS:  Constitutional, HEENT, cardiovascular, pulmonary, gi and gu systems are negative,  "except as otherwise noted.    OBJECTIVE:     /82 (BP Location: Left arm, Patient Position: Sitting, Cuff Size: Adult Large)   Pulse 87   Temp 97.9  F (36.6  C) (Tympanic)   Resp 18   Ht 1.549 m (5' 1\")   Wt 90.7 kg (200 lb)   SpO2 97%   BMI 37.79 kg/m    Body mass index is 37.79 kg/m .  Physical Exam   Constitutional: She is oriented to person, place, and time. She appears well-developed and well-nourished. No distress.   Neurological: She is alert and oriented to person, place, and time.   Psychiatric: She has a normal mood and affect.       Other exam not repeated.  Diagnostic Test Results:  none     ASSESSMENT/PLAN:     Type 2 diabetes mellitus without complication, without long-term current use of insulin (H)  Most recent fasting labs are reviewed.  Goal of hemoglobin A1C of less than 7 discussed.  Instructed in the importance of diet, exercise, and good glycemic control in prevention of secondary complications.    Continue same medication regimen. Repeat fasting glucose and hemoglobin A1C in 3 months.    - metFORMIN (GLUCOPHAGE) 500 MG tablet; TAKE 2 TABLETS BY MOUTH TWICE DAILY WITH MEALS    HTN (hypertension)  Goals reviewed.  Continue home BP monitoring. Continue lisinopril 10 mg daily medication.  Follow up 6 months.   - lisinopril (PRINIVIL/ZESTRIL) 10 MG tablet; Take 1 tablet (10 mg) by mouth daily            Gonzalo Pino MD  [facility] [place]    "

## 2019-12-03 ASSESSMENT — ASTHMA QUESTIONNAIRES: ACT_TOTALSCORE: 22

## 2019-12-11 DIAGNOSIS — E11.9 TYPE 2 DIABETES MELLITUS WITHOUT COMPLICATION, WITHOUT LONG-TERM CURRENT USE OF INSULIN (H): ICD-10-CM

## 2019-12-11 NOTE — TELEPHONE ENCOUNTER
Januvia 25mg daily  Last visit date with prescribing provider: 12-2-2019  Last refill date: no hx   Quantity: ?, Refills: ?    Just saw provider 12-2-2019  Medication not on chart or in note ???    Caren Schwartz LPN

## 2019-12-13 RX ORDER — SITAGLIPTIN 25 MG/1
TABLET, FILM COATED ORAL
Qty: 30 TABLET | Refills: 3 | Status: SHIPPED | OUTPATIENT
Start: 2019-12-13 | End: 2020-09-11

## 2019-12-26 DIAGNOSIS — E11.65 TYPE 2 DIABETES MELLITUS WITH HYPERGLYCEMIA, WITHOUT LONG-TERM CURRENT USE OF INSULIN (H): ICD-10-CM

## 2019-12-26 NOTE — TELEPHONE ENCOUNTER
Contour Next Test Strips      Last Written Prescription Date:  10/29/19  Last Fill Quantity: 50 strips,   # refills: 3  Last Office Visit: 12/2/19  Future Office visit:       Routing refill request to provider for review/approval because:  Drug not on the [facility], [facility] or [facility] refill protocol or controlled substance

## 2019-12-31 ENCOUNTER — TELEPHONE (OUTPATIENT)
Dept: EDUCATION SERVICES | Facility: HOSPITAL | Age: 49
End: 2019-12-31

## 2019-12-31 DIAGNOSIS — E11.9 TYPE 2 DIABETES MELLITUS WITHOUT COMPLICATION, WITHOUT LONG-TERM CURRENT USE OF INSULIN (H): Primary | ICD-10-CM

## 2019-12-31 RX ORDER — LANCETS 33 GAUGE
1 EACH MISCELLANEOUS 2 TIMES DAILY
Qty: 100 EACH | Refills: 11 | Status: SHIPPED | OUTPATIENT
Start: 2019-12-31 | End: 2021-03-17

## 2019-12-31 NOTE — TELEPHONE ENCOUNTER
Contour test strips are no longer covered. Contacted pharmacy. One Touch products are covered. Patient cost would be $35 for meter and $65 co-pay for test strips. Edith has a large deductible. I called Edith and offered a One Touch Verio meter kit and she will come to pick it up.    Prescriptions sent for test strips and lancets.

## 2020-01-03 ENCOUNTER — ALLIED HEALTH/NURSE VISIT (OUTPATIENT)
Dept: EDUCATION SERVICES | Facility: OTHER | Age: 50
End: 2020-01-03
Attending: NURSE PRACTITIONER
Payer: COMMERCIAL

## 2020-01-03 DIAGNOSIS — E11.9 TYPE 2 DIABETES MELLITUS WITHOUT COMPLICATION, WITHOUT LONG-TERM CURRENT USE OF INSULIN (H): Primary | ICD-10-CM

## 2020-01-03 NOTE — PROGRESS NOTES
Pt  Came in to  One Touch Verio, which was on the desk below the meters with her name on it.    Jessa Behrman, LPN

## 2020-01-10 DIAGNOSIS — I10 BENIGN ESSENTIAL HYPERTENSION: ICD-10-CM

## 2020-01-13 RX ORDER — METOPROLOL SUCCINATE 100 MG/1
TABLET, EXTENDED RELEASE ORAL
Qty: 90 TABLET | Refills: 1 | OUTPATIENT
Start: 2020-01-13

## 2020-03-02 ENCOUNTER — HEALTH MAINTENANCE LETTER (OUTPATIENT)
Age: 50
End: 2020-03-02

## 2020-04-10 NOTE — TELEPHONE ENCOUNTER
metformin      Last Written Prescription Date:  1/5/18  Last Fill Quantity: 120,   # refills: 0  Last Office Visit: 12/18/17  Future Office visit:  none       
regular rate and rhythm

## 2020-04-15 DIAGNOSIS — E11.9 TYPE 2 DIABETES MELLITUS WITHOUT COMPLICATION, WITHOUT LONG-TERM CURRENT USE OF INSULIN (H): ICD-10-CM

## 2020-04-15 NOTE — TELEPHONE ENCOUNTER
metformin      Last Written Prescription Date:  12/2/19  Last Fill Quantity: 120,   # refills: 3  Last Office Visit: 12/2/19  Future Office visit:       Routing refill request to provider for review/approval because:    Biguanide Agents [id number]/15/2020 10:52 AM   Patient has documented A1c within the specified period of time. Protocol Details    Patient's CR is NOT>1.4 OR Patient's EGFR is NOT<45 within past 12 mos.

## 2020-06-08 DIAGNOSIS — J30.1 SEASONAL ALLERGIC RHINITIS DUE TO POLLEN: ICD-10-CM

## 2020-06-09 RX ORDER — FLUTICASONE PROPIONATE 50 MCG
SPRAY, SUSPENSION (ML) NASAL
Qty: 16 G | Refills: 0 | Status: SHIPPED | OUTPATIENT
Start: 2020-06-09 | End: 2020-07-06

## 2020-07-06 DIAGNOSIS — J30.1 SEASONAL ALLERGIC RHINITIS DUE TO POLLEN: ICD-10-CM

## 2020-07-06 RX ORDER — FLUTICASONE PROPIONATE 50 MCG
SPRAY, SUSPENSION (ML) NASAL
Qty: 16 G | Refills: 1 | Status: SHIPPED | OUTPATIENT
Start: 2020-07-06 | End: 2020-09-01

## 2020-07-18 DIAGNOSIS — I10 BENIGN ESSENTIAL HYPERTENSION: ICD-10-CM

## 2020-07-20 NOTE — TELEPHONE ENCOUNTER
Toprol      Last Written Prescription Date:  12/02/19  Last Fill Quantity: 90,   # refills: 1  Last Office Visit: 12/02/19  Future Office visit:       Routing refill request to provider for review/approval because:  BP normal, Last BMP 07/02/18

## 2020-07-21 RX ORDER — METOPROLOL SUCCINATE 100 MG/1
TABLET, EXTENDED RELEASE ORAL
Qty: 90 TABLET | Refills: 1 | Status: SHIPPED | OUTPATIENT
Start: 2020-07-21 | End: 2020-09-11

## 2020-08-15 DIAGNOSIS — E11.9 TYPE 2 DIABETES MELLITUS WITHOUT COMPLICATION, WITHOUT LONG-TERM CURRENT USE OF INSULIN (H): Primary | ICD-10-CM

## 2020-08-17 NOTE — TELEPHONE ENCOUNTER
Metformin       Last Written Prescription Date:  4/16/2020  Last Fill Quantity: 120,   # refills: 3  Last Office Visit: 12/02/2019  Future Office visit:    Next 5 appointments (look out 90 days)    Oct 22, 2020  8:30 AM CDT  (Arrive by 8:15 AM)  Return Visit with Charo Ramos DPM  [facility] ([facility] ) [address] [place] [id number] [phone]

## 2020-08-18 NOTE — TELEPHONE ENCOUNTER
Due for diabetic f/u and a1c, creat,GFR.   Last refill below. Writer scheduled diabetic f/u on 9/11/20. Pt is requesting 1,000 mg tabs.     Pt reports she is out of her medication, requesting to pick it up tomorrow.     Next 5 appointments (look out 90 days)    Sep 11, 2020 11:00 AM CDT  (Arrive by 10:45 AM)  [facility] [address] [place] [id number] [phone]   Oct 22, 2020  8:30 AM CDT  (Arrive by 8:15 AM)  Return Visit with Charo Ramos DPM  [facility] ([facility] ) [address] [place] [id number] [phone]

## 2020-08-26 ENCOUNTER — TELEPHONE (OUTPATIENT)
Dept: FAMILY MEDICINE | Facility: OTHER | Age: 50
End: 2020-08-26

## 2020-08-26 DIAGNOSIS — E11.9 TYPE 2 DIABETES MELLITUS WITHOUT COMPLICATION, WITHOUT LONG-TERM CURRENT USE OF INSULIN (H): Primary | ICD-10-CM

## 2020-08-27 ENCOUNTER — HOSPITAL ENCOUNTER (OUTPATIENT)
Dept: EDUCATION SERVICES | Facility: HOSPITAL | Age: 50
Discharge: HOME | End: 2020-08-27
Attending: FAMILY MEDICINE | Admitting: FAMILY MEDICINE
Payer: COMMERCIAL

## 2020-08-27 VITALS
OXYGEN SATURATION: 99 % | BODY MASS INDEX: 38.02 KG/M2 | RESPIRATION RATE: 16 BRPM | WEIGHT: 201.4 LBS | HEIGHT: 61 IN | HEART RATE: 96 BPM | SYSTOLIC BLOOD PRESSURE: 144 MMHG | DIASTOLIC BLOOD PRESSURE: 88 MMHG

## 2020-08-27 DIAGNOSIS — E11.9 TYPE 2 DIABETES MELLITUS WITHOUT COMPLICATION, WITHOUT LONG-TERM CURRENT USE OF INSULIN (H): Primary | ICD-10-CM

## 2020-08-27 LAB — HBA1C MFR BLD: 7.1 % (ref 0–5.7)

## 2020-08-27 PROCEDURE — 83036 HEMOGLOBIN GLYCOSYLATED A1C: CPT

## 2020-08-27 PROCEDURE — G0108 DIAB MANAGE TRN  PER INDIV: HCPCS

## 2020-08-27 PROCEDURE — 36416 COLLJ CAPILLARY BLOOD SPEC: CPT

## 2020-08-27 ASSESSMENT — PAIN SCALES - GENERAL: PAINLEVEL: NO PAIN (0)

## 2020-08-27 ASSESSMENT — MIFFLIN-ST. JEOR: SCORE: 1475.92

## 2020-08-28 DIAGNOSIS — E11.65 TYPE 2 DIABETES MELLITUS WITH HYPERGLYCEMIA, WITHOUT LONG-TERM CURRENT USE OF INSULIN (H): Primary | ICD-10-CM

## 2020-08-28 NOTE — TELEPHONE ENCOUNTER
Met with Edith yesterday. She is concerned that BG readings are trending upward.    Lab Results   Component Value Date    A1C 7.1 08/27/2020    A1C 6.5 08/07/2019    A1C 7.1 04/08/2019    A1C 6.5 06/25/2018    A1C 6.5 12/18/2017     Edith is open to trying Trulicity.    If you agree, please find a prescription for Trulicity for you to sign pended in this encounter.    Thanks

## 2020-08-29 RX ORDER — DULAGLUTIDE 0.75 MG/.5ML
0.75 INJECTION, SOLUTION SUBCUTANEOUS
Qty: 2 ML | Refills: 3 | Status: SHIPPED | OUTPATIENT
Start: 2020-08-29 | End: 2020-12-11

## 2020-08-31 ENCOUNTER — TELEPHONE (OUTPATIENT)
Dept: EDUCATION SERVICES | Facility: HOSPITAL | Age: 50
End: 2020-08-31

## 2020-08-31 NOTE — TELEPHONE ENCOUNTER
Called and left message for Edith that Trulicity prescription went though and that I have a sample ready for her here at the clinic. Instructed her to call [phone] or send [employer] message with questions or concerns.

## 2020-08-31 NOTE — PATIENT INSTRUCTIONS
Inject Trulicity 0.75 mg weekly. Pick one day of the week which you will take the Trulicity - can be taken any time of day. You will use one pen per week.    Questions/concerns; please call [phone] or send [employer] message.    I will call you for follow-up in 2 weeks.

## 2020-08-31 NOTE — PROGRESS NOTES
"Diabetes Self-Management Education & Support    Presents for: Individual review    SUBJECTIVE/OBJECTIVE:  Presents for: Individual review  Accompanied by: Self  Diabetes education in the past 24mo: Yes  Focus of Visit: Taking Medication  Diabetes type: Type 2  Disease course: Getting harder to manage  Diabetes management related comments/concerns: Did not take Januvia due to expense, BG readings increasing  Transportation concerns: No  Difficulty affording diabetes medication?: Sometimes  Difficulty affording diabetes testing supplies?: No  Other concerns:: None  Cultural Influences/Ethnic Background:  American      Diabetes Symptoms & Complications:  Fatigue: No  Neuropathy: No  Polydipsia: No  Polyphagia: No  Polyuria: No  Visual change: No  Slow healing wounds: No  Symptom course: Stable  Weight trend: Stable  Autonomic neuropathy: No  CVA: No  Heart disease: No  Nephropathy: No  Peripheral neuropathy: No  Foot ulcerations: No  Peripheral Vascular Disease: No  Retinopathy: No  Sexual dysfunction: No    Patient Problem List and Family Medical History reviewed for relevant medical history, current medical status, and diabetes risk factors.    Vitals:  /88   Pulse 96   Resp 16   Ht 1.549 m (5' 1\")   Wt 91.4 kg (201 lb 6.4 oz)   SpO2 99%   BMI 38.05 kg/m    Estimated body mass index is 38.05 kg/m  as calculated from the following:    Height as of this encounter: 1.549 m (5' 1\").    Weight as of this encounter: 91.4 kg (201 lb 6.4 oz).   Last 3 BP:   BP Readings from Last 3 Encounters:   08/27/20 144/88   12/02/19 136/82   11/21/19 (!) 154/84       History   Smoking Status     Never Smoker   Smokeless Tobacco     Never Used     Comment: no passive exposure       Labs:  Lab Results   Component Value Date    A1C 7.1 08/27/2020     Lab Results   Component Value Date     06/25/2018     Lab Results   Component Value Date    LDL 78 04/08/2019     HDL Cholesterol   Date Value Ref Range Status   04/08/2019 " 73 >49 mg/dL Final   ]  GFR Estimate   Date Value Ref Range Status   06/03/2019 >90 >60 mL/min/[1.73_m2] Final     GFR Estimate If Black   Date Value Ref Range Status   06/03/2019 >90 >60 mL/min/[1.73_m2] Final     Lab Results   Component Value Date    CR 0.65 06/25/2018     No results found for: MICROALBUMIN    Healthy Eating:  Healthy Eating Assessed Today: No  Cultural/Mandaen diet restrictions?: No  Meals include: Breakfast, Lunch, Dinner  Beverages: Water, Diet soda  Has patient met with a dietitian in the past?: No    Being Active:  Being Active Assessed Today: No  Barrier to exercise: None    Monitoring:  Monitoring Assessed Today: Yes  Did patient bring glucose meter to appointment? : Yes  Blood Glucose Meter: One Touch  Times checking blood sugar at home (number): 2  Times checking blood sugar at home (per): Day  Blood glucose trend: Increasing    Taking Medications:  Metformin 1000 mg bid  Taking Medication Assessed Today: Yes  Current Treatments: Oral Medication (taken by mouth)  Problems taking diabetes medications regularly?: No  Diabetes medication side effects?: No    Problem Solving:  Problem Solving Assessed Today: Yes  Is the patient at risk for hypoglycemia?: No  Hypoglycemia Frequency: Rarely  Hypoglycemia Treatment: Glucose (tablets or gel), Candy  Patient carries a carbohydrate source: No  Medical ID: No  Does patient have DKA prevention plan?: No  Does patient have severe weather/disaster plan for diabetes management?: No    Hypoglycemia symptoms  Confusion: No  Dizziness or Light-Headedness: No  Headaches: No  Hunger: Yes  Mood changes: No  Nervousness/Anxiety: No  Sleepiness: No  Speech difficulty: No  Sweats: No  Feeling shaky: No    Hypoglycemia Complications  Blackouts: No  Hospitalization: No  Nocturnal hypoglycemia: No  Required assistance: No  Required glucagon injection: No  Seizures: No    Reducing Risks:  Reducing Risks Assessed Today: Yes  CAD Risks: Family history, Hypertension,  Obesity, Stress  Has dilated eye exam at least once a year?: Yes  Sees dentist every 6 months?: Yes  Feet checked by healthcare provider in the last year?: Yes    Healthy Coping:  Healthy Coping Assessed Today: Yes  Emotional response to diabetes: Confidence diabetes can be controlled  Informal Support system:: Children, Family, Parent, Spouse  Stage of change: ACTION (Actively working towards change)  Support resources: None  Patient Activation Measure Survey Score:  JAIME Score (Last Two) 12/19/2018   JAIME Raw Score 40   Activation Score 100   JAIME Level 4       Diabetes knowledge and skills assessment:   Patient is knowledgeable in diabetes management concepts related to: Healthy Eating, Being Active, Monitoring and Taking Medication    Patient needs further education on the following diabetes management concepts: Monitoring, Taking Medication and Reducing Risks    Based on learning assessment above, most appropriate setting for further diabetes education would be: Individual setting.      INTERVENTIONS:    Education provided today on:  AADE Self-Care Behaviors:  Diabetes Pathophysiology  Monitoring: log and interpret results, individual blood glucose targets and frequency of monitoring  Taking Medication: action of prescribed medication, drawing up, administering and storing injectable diabetes medications, proper site selection and rotation for injections, side effects of prescribed medications and when to take medications  Reducing Risks: prevention, early diagnostic measures and treatment of complications and A1C - goals, relating to blood glucose levels, how often to check  GLP-1 administration technique taught today. Patient verbalized understanding and was able to perform an accurate return demonstration of administration technique. Side effects were discussed, if patient has any abdominal pain, with or without nausea and/or vomiting, stop medication, call provider, clinic or go to the emergency  room.    Opportunities for ongoing education and support in diabetes-self management were discussed.    Pt verbalized understanding of concepts discussed and recommendations provided today.       Education Materials Provided:  Medication Information on Trulicity      ASSESSMENT:  Edith is concerned about increased BG readings: fastin-189 and post-meal: 179, 254, 277. She never took the Januvia. A1C is trending up:    Lab Results   Component Value Date    A1C 7.1 2020    A1C 6.5 2019    A1C 7.1 2019    A1C 6.5 2018    A1C 6.5 2017           Patient's most recent   Lab Results   Component Value Date    A1C 7.1 2020    is meeting goal of <8.0    PLAN  See Patient Instructions for co-developed, patient-stated behavior change goals.  I will contact Dr. Pino about starting Trulicity  AVS printed and provided to patient today. See Follow-Up section for recommended follow-up.      Time Spent: 30 minutes  Encounter Type: Individual    Any diabetes medication dose changes were made via the CDE Protocol and Collaborative Practice Agreement with the patient's primary care provider. A copy of this encounter was shared with the provider.

## 2020-09-01 DIAGNOSIS — J30.1 SEASONAL ALLERGIC RHINITIS DUE TO POLLEN: ICD-10-CM

## 2020-09-01 RX ORDER — FLUTICASONE PROPIONATE 50 MCG
SPRAY, SUSPENSION (ML) NASAL
Qty: 16 G | Refills: 1 | Status: SHIPPED | OUTPATIENT
Start: 2020-09-01 | End: 2020-11-02

## 2020-09-01 NOTE — TELEPHONE ENCOUNTER
Flonase 50 mcg      Last Written Prescription Date:  7/6/20  Last Fill Quantity: 16 g,   # refills: 1  Last Office Visit: 12/2/19  Future Office visit:    Next 5 appointments (look out 90 days)    Sep 11, 2020 11:00 AM CDT  (Arrive by 10:45 AM)  [facility]alo [facility]  [facility] [address] [place] [id number]  [phone]   Oct 22, 2020  8:30 AM CDT  (Arrive by 8:15 AM)  Return Visit with Charo Ramos DPM  [facility] ([facility] ) [address] [place] [id number] [phone]           Routing refill request to provider for review/approval because:

## 2020-09-11 ENCOUNTER — OFFICE VISIT (OUTPATIENT)
Dept: FAMILY MEDICINE | Facility: OTHER | Age: 50
End: 2020-09-11
Attending: FAMILY MEDICINE
Payer: COMMERCIAL

## 2020-09-11 VITALS
SYSTOLIC BLOOD PRESSURE: 144 MMHG | TEMPERATURE: 97.6 F | WEIGHT: 199 LBS | HEIGHT: 61 IN | OXYGEN SATURATION: 97 % | DIASTOLIC BLOOD PRESSURE: 88 MMHG | HEART RATE: 101 BPM | BODY MASS INDEX: 37.57 KG/M2

## 2020-09-11 DIAGNOSIS — J45.20 MILD INTERMITTENT ASTHMA WITHOUT COMPLICATION: ICD-10-CM

## 2020-09-11 DIAGNOSIS — E11.9 TYPE 2 DIABETES MELLITUS WITHOUT COMPLICATION, WITHOUT LONG-TERM CURRENT USE OF INSULIN (H): ICD-10-CM

## 2020-09-11 DIAGNOSIS — I10 BENIGN ESSENTIAL HYPERTENSION: ICD-10-CM

## 2020-09-11 PROCEDURE — 99214 OFFICE O/P EST MOD 30 MIN: CPT | Performed by: FAMILY MEDICINE

## 2020-09-11 RX ORDER — ALBUTEROL SULFATE 90 UG/1
2 AEROSOL, METERED RESPIRATORY (INHALATION) EVERY 6 HOURS
Qty: 18 G | Refills: 3 | Status: SHIPPED | OUTPATIENT
Start: 2020-09-11 | End: 2022-04-28

## 2020-09-11 RX ORDER — AMLODIPINE BESYLATE 10 MG/1
10 TABLET ORAL DAILY
Qty: 90 TABLET | Refills: 2 | Status: SHIPPED | OUTPATIENT
Start: 2020-09-11 | End: 2021-05-27

## 2020-09-11 RX ORDER — METOPROLOL SUCCINATE 100 MG/1
100 TABLET, EXTENDED RELEASE ORAL DAILY
Qty: 90 TABLET | Refills: 1 | Status: SHIPPED | OUTPATIENT
Start: 2020-09-11 | End: 2021-07-06

## 2020-09-11 RX ORDER — LISINOPRIL 10 MG/1
10 TABLET ORAL 2 TIMES DAILY
Qty: 180 TABLET | Refills: 3 | Status: SHIPPED | OUTPATIENT
Start: 2020-09-11 | End: 2021-09-03

## 2020-09-11 ASSESSMENT — PAIN SCALES - GENERAL: PAINLEVEL: NO PAIN (0)

## 2020-09-11 ASSESSMENT — ANXIETY QUESTIONNAIRES
2. NOT BEING ABLE TO STOP OR CONTROL WORRYING: NOT AT ALL
3. WORRYING TOO MUCH ABOUT DIFFERENT THINGS: NOT AT ALL
4. TROUBLE RELAXING: NOT AT ALL
6. BECOMING EASILY ANNOYED OR IRRITABLE: NOT AT ALL
1. FEELING NERVOUS, ANXIOUS, OR ON EDGE: NOT AT ALL
5. BEING SO RESTLESS THAT IT IS HARD TO SIT STILL: NOT AT ALL
GAD7 TOTAL SCORE: 0
7. FEELING AFRAID AS IF SOMETHING AWFUL MIGHT HAPPEN: NOT AT ALL

## 2020-09-11 ASSESSMENT — PATIENT HEALTH QUESTIONNAIRE - PHQ9: SUM OF ALL RESPONSES TO PHQ QUESTIONS 1-9: 0

## 2020-09-11 ASSESSMENT — ASTHMA QUESTIONNAIRES
QUESTION_5 LAST FOUR WEEKS HOW WOULD YOU RATE YOUR ASTHMA CONTROL: WELL CONTROLLED
ACT_TOTALSCORE: 24
QUESTION_2 LAST FOUR WEEKS HOW OFTEN HAVE YOU HAD SHORTNESS OF BREATH: NOT AT ALL
QUESTION_1 LAST FOUR WEEKS HOW MUCH OF THE TIME DID YOUR ASTHMA KEEP YOU FROM GETTING AS MUCH DONE AT WORK, SCHOOL OR AT HOME: NONE OF THE TIME
QUESTION_4 LAST FOUR WEEKS HOW OFTEN HAVE YOU USED YOUR RESCUE INHALER OR NEBULIZER MEDICATION (SUCH AS ALBUTEROL): NOT AT ALL
QUESTION_3 LAST FOUR WEEKS HOW OFTEN DID YOUR ASTHMA SYMPTOMS (WHEEZING, COUGHING, SHORTNESS OF BREATH, CHEST TIGHTNESS OR PAIN) WAKE YOU UP AT NIGHT OR EARLIER THAN USUAL IN THE MORNING: NOT AT ALL

## 2020-09-11 ASSESSMENT — MIFFLIN-ST. JEOR: SCORE: 1465.04

## 2020-09-11 NOTE — LETTER
My Asthma Action Plan    Name: Edith Doty   YOB: 1970  Date: 9/11/2020   My doctor: Gonzalo Pino MD   [facility]        My Rescue Medicine:   Albuterol inhaler (Proair/Ventolin/Proventil HFA)  2-4 puffs EVERY 4 HOURS as needed. Use a spacer if recommended by your provider.   My Asthma Severity:   Intermittent / Exercise Induced  Know your asthma triggers: exercise or sports             GREEN ZONE   Good Control    I feel good    No cough or wheeze    Can work, sleep and play without asthma symptoms       Take your asthma control medicine every day.     1. If exercise triggers your asthma, take your rescue medication    15 minutes before exercise or sports, and    During exercise if you have asthma symptoms  2. Spacer to use with inhaler: If you have a spacer, make sure to use it with your inhaler             YELLOW ZONE Getting Worse  I have ANY of these:    I do not feel good    Cough or wheeze    Chest feels tight    Wake up at night   1. Keep taking your Green Zone medications  2. Start taking your rescue medicine:    every 20 minutes for up to 1 hour. Then every 4 hours for 24-48 hours.  3. If you stay in the Yellow Zone for more than 12-24 hours, contact your doctor.  4. If you do not return to the Green Zone in 12-24 hours or you get worse, start taking your oral steroid medicine if prescribed by your provider.           RED ZONE Medical Alert - Get Help  I have ANY of these:    I feel awful    Medicine is not helping    Breathing getting harder    Trouble walking or talking    Nose opens wide to breathe       1. Take your rescue medicine NOW  2. If your provider has prescribed an oral steroid medicine, start taking it NOW  3. Call your doctor NOW  4. If you are still in the Red Zone after 20 minutes and you have not reached your doctor:    Take your rescue medicine again and    Call 911 or go to the emergency room right away    See your regular doctor  within 2 weeks of an Emergency Room or Urgent Care visit for follow-up treatment.          Annual Reminders:  Meet with Asthma Educator,  Flu Shot in the Fall, consider Pneumonia Vaccination for patients with asthma (aged 19 and older).    Pharmacy:    [id number] - [place], [place] - [address]  [id number] - [place] - [id number]     Electronically signed by Gonzalo Pino MD   Date: 09/11/20                    Asthma Triggers  How To Control Things That Make Your Asthma Worse    Triggers are things that make your asthma worse.  Look at the list below to help you find your triggers and   what you can do about them. You can help prevent asthma flare-ups by staying away from your triggers.      Trigger                                                          What you can do   Cigarette Smoke  Tobacco smoke can make asthma worse. Do not allow smoking in your home, car or around you.  Be sure no one smokes at a child s day care or school.  If you smoke, ask your health care provider for ways to help you quit.  Ask family members to quit too.  Ask your health care provider for a referral to Quit Plan to help you quit smoking, or call 9-371-144-PLAN.     Colds, Flu, Bronchitis  These are common triggers of asthma. Wash your hands often.  Don t touch your eyes, nose or mouth.  Get a flu shot every year.     Dust Mites  These are tiny bugs that live in cloth or carpet. They are too small to see. Wash sheets and blankets in hot water every week.   Encase pillows and mattress in dust mite proof covers.  Avoid having carpet if you can. If you have carpet, vacuum weekly.   Use a dust mask and HEPA vacuum.   Pollen and Outdoor Mold  Some people are allergic to trees, grass, or weed pollen, or molds. Try to keep your windows closed.  Limit time out doors when pollen count is high.   Ask you health care provider about taking medicine during allergy season.     Animal  Dander  Some people are allergic to skin flakes, urine or saliva from pets with fur or feathers. Keep pets with fur or feathers out of your home.    If you can t keep the pet outdoors, then keep the pet out of your bedroom.  Keep the bedroom door closed.  Keep pets off cloth furniture and away from stuffed toys.     Mice, Rats, and Cockroaches  Some people are allergic to the waste from these pests.   Cover food and garbage.  Clean up spills and food crumbs.  Store grease in the refrigerator.   Keep food out of the bedroom.   Indoor Mold  This can be a trigger if your home has high moisture. Fix leaking faucets, pipes, or other sources of water.   Clean moldy surfaces.  Dehumidify basement if it is damp and smelly.   Smoke, Strong Odors, and Sprays  These can reduce air quality. Stay away from strong odors and sprays, such as perfume, powder, hair spray, paints, smoke incense, paint, cleaning products, candles and new carpet.   Exercise or Sports  Some people with asthma have this trigger. Be active!  Ask your doctor about taking medicine before sports or exercise to prevent symptoms.    Warm up for 5-10 minutes before and after sports or exercise.     Other Triggers of Asthma  Cold air:  Cover your nose and mouth with a scarf.  Sometimes laughing or crying can be a trigger.  Some medicines and food can trigger asthma.

## 2020-09-11 NOTE — NURSING NOTE
"Chief Complaint   Patient presents with     Diabetes     Hypertension       Initial BP (!) 144/88 (BP Location: Left arm, Patient Position: Sitting, Cuff Size: Adult Large)   Pulse 101   Temp 97.6  F (36.4  C) (Tympanic)   Ht 1.549 m (5' 1\")   Wt 90.3 kg (199 lb)   SpO2 97%   BMI 37.60 kg/m   Estimated body mass index is 37.6 kg/m  as calculated from the following:    Height as of this encounter: 1.549 m (5' 1\").    Weight as of this encounter: 90.3 kg (199 lb).  Medication Reconciliation: complete  Serenity Clinton LPN  "

## 2020-09-11 NOTE — PROGRESS NOTES
SUBJECTIVE:   Edith Doty is a 49 year old female who presents to clinic today for the following health issues:    Diabetes Follow-up    Patient is checking blood sugars: once daily.  Results are as follows:         am - 130's    Diabetic concerns: None     Symptoms of hypoglycemia (low blood sugar): shaky, dizzy, weak     Paresthesias (numbness or burning in feet) or sores: No     Date of last diabetic eye exam: prior    BP Readings from Last 2 Encounters:   09/11/20 (!) 144/88   08/27/20 144/88     Hemoglobin A1C (%)   Date Value   08/27/2020 7.1 (A)   08/07/2019 6.5 (A)     LDL Cholesterol Calculated (mg/dL)   Date Value   04/08/2019 78   08/15/2017 63     Hypertension Follow-up      Outpatient blood pressures are being checked at home.  Results are mildly elevated.    Low Salt Diet: no added salt      Amount of exercise or physical activity: None    Problems taking medications regularly: No    Medication side effects: none    Diet: diabetic    Problem list and histories reviewed & adjusted, as indicated.  Additional history: as documented    Patient Active Problem List   Diagnosis     Hypothyroidism, acquired     Asthma, mild intermittent     Allergic rhinitis due to pollen     HTN (hypertension)     Nontoxic uninodular goiter     Obesity     Type 2 diabetes mellitus without complication, without long-term current use of insulin (H)     Achilles tendon tear     Asthma     Chronic lymphocytic thyroiditis     Hypertension     Adjustment disorder with anxious mood     Past Surgical History:   Procedure Laterality Date     CHOLECYSTECTOMY  1996     eye surgery for strabismus at ages 2 & 4         Social History     Tobacco Use     Smoking status: Never Smoker     Smokeless tobacco: Never Used     Tobacco comment: no passive exposure   Substance Use Topics     Alcohol use: Yes     Alcohol/week: 0.0 standard drinks     Comment: socially      Family History   Problem Relation Age of Onset     Diabetes Father       Other - See Comments Sister         endometriosis     Allergies Sister         environmental     Other - See Comments Mother         fibromyalgia and endometreosis     Heart Disease Mother         murmer     Allergies Mother         environmental     Hypertension Brother      Allergies Brother         environmental     Asthma No family hx of      Thyroid Disease No family hx of          Current Outpatient Medications   Medication Sig Dispense Refill     albuterol (2.5 MG/3ML) 0.083% nebulizer solution USE 1 VIAL BY NEBULIZATION EVERY 6 HOURS AS NEEDED FOR SHORTNESS OF BREATH / DYSPNEA OR WHEEZING 90 mL 3     albuterol (PROAIR HFA) 108 (90 Base) MCG/ACT inhaler Inhale 2 puffs into the lungs every 6 hours 18 g 3     amLODIPine (NORVASC) 10 MG tablet Take 1 tablet (10 mg) by mouth daily 90 tablet 2     blood glucose (CONTOUR NEXT TEST) test strip USE TO TEST TWICE DAILY 50 strip 3     blood glucose (ONETOUCH VERIO IQ) test strip Use to test blood sugar 2 times daily. 100 each 11     blood glucose monitoring (SOFTCLIX) lancets TEST TWICE DAILY 100 each 10     dulaglutide (TRULICITY) 0.75 MG/0.5ML pen Inject 0.75 mg Subcutaneous every 7 days 2 mL 3     fluticasone (FLONASE) 50 MCG/ACT nasal spray SHAKE LIQUID AND USE 2 SPRAYS IN EACH NOSTRIL DAILY 16 g 1     levothyroxine (SYNTHROID/LEVOTHROID) 150 MCG tablet Take 150 mcg by mouth       lisinopril (PRINIVIL/ZESTRIL) 10 MG tablet Take 1 tablet (10 mg) by mouth 2 times daily 180 tablet 3     metFORMIN (GLUCOPHAGE) 1000 MG tablet TAKE 1 TABLET BY MOUTH TWICE DAILY WITH MEALS 60 tablet 0     metoprolol succinate ER (TOPROL-XL) 100 MG 24 hr tablet TAKE 1 TABLET(100 MG) BY MOUTH DAILY 90 tablet 1     OneTouch Delica Lancets 33G MISC 1 each 2 times daily 100 each 11     Allergies   Allergen Reactions     Amoxicillin Trihydrate Itching     Augmentin       Clavulanic Acid Potassium Itching     Augmentin       Levaquin [Levofloxacin] Swelling     Penicillins Hives       Reviewed  "and updated as needed this visit by clinical staff  Tobacco  Allergies  Meds       Reviewed and updated as needed this visit by Provider         ROS:  Constitutional, HEENT, cardiovascular, pulmonary, gi and gu systems are negative, except as otherwise noted.    OBJECTIVE:     BP (!) 144/88 (BP Location: Left arm, Patient Position: Sitting, Cuff Size: Adult Large)   Pulse 101   Temp 97.6  F (36.4  C) (Tympanic)   Ht 1.549 m (5' 1\")   Wt 90.3 kg (199 lb)   SpO2 97%   BMI 37.60 kg/m    Body mass index is 37.6 kg/m .  Physical Exam   Constitutional: She is oriented to person, place, and time. She appears well-developed and well-nourished. No distress.   Neurological: She is alert and oriented to person, place, and time.   Psychiatric: She has a normal mood and affect.       Other exam not repeated.  Diagnostic Test Results:  none     ASSESSMENT/PLAN:     Asthma, mild intermittent  Refilled as written.  - albuterol (PROAIR HFA) 108 (90 Base) MCG/ACT inhaler; Inhale 2 puffs into the lungs every 6 hours    Type 2 diabetes mellitus without complication, without long-term current use of insulin (H)  Fasting labs are reviewed.  Goal of hemoglobin A1C of less than 7 discussed.  Instructed in the importance of diet, exercise, and good glycemic control in prevention of secondary complications.    Continue same medication regimen. Repeat fasting glucose and hemoglobin A1C in 3 months.   - metFORMIN (GLUCOPHAGE) 1000 MG tablet; TAKE 1 TABLET BY MOUTH TWICE DAILY WITH MEALS    HTN (hypertension)  Goals reviewed.  Continue home BP monitoring and same medication regimen.  Follow up 6 months.   - metoprolol succinate ER (TOPROL-XL) 100 MG 24 hr tablet; Take 1 tablet (100 mg) by mouth daily  - lisinopril (ZESTRIL) 10 MG tablet; Take 1 tablet (10 mg) by mouth 2 times daily  - amLODIPine (NORVASC) 10 MG tablet; Take 1 tablet (10 mg) by mouth daily            Gonzalo Pino MD  [facility] [place]    "

## 2020-09-12 ASSESSMENT — ANXIETY QUESTIONNAIRES: GAD7 TOTAL SCORE: 0

## 2020-09-12 ASSESSMENT — ASTHMA QUESTIONNAIRES: ACT_TOTALSCORE: 24

## 2020-09-23 ENCOUNTER — TELEPHONE (OUTPATIENT)
Dept: FAMILY MEDICINE | Facility: OTHER | Age: 50
End: 2020-09-23

## 2020-09-23 NOTE — TELEPHONE ENCOUNTER
Received a PA from [employer] for Trulicity 0.75mg/0.5ml pen-injectors. Submitted on M. Received an instant response back stating the drug is covered by current benefit plan. No further PA activity needed. Forms scanned to Epic.

## 2020-09-25 ENCOUNTER — TELEPHONE (OUTPATIENT)
Dept: FAMILY MEDICINE | Facility: OTHER | Age: 50
End: 2020-09-25

## 2020-09-25 NOTE — TELEPHONE ENCOUNTER
Received a PA from [employer] for Metformin HCI 1000mg tablets. Submitted on CM. Received an instant response stating this drug is covered by current benefit plan. No further PA activity needed. Forms scanned to Epic.

## 2020-10-17 DIAGNOSIS — E11.9 TYPE 2 DIABETES MELLITUS WITHOUT COMPLICATION, WITHOUT LONG-TERM CURRENT USE OF INSULIN (H): ICD-10-CM

## 2020-10-19 NOTE — TELEPHONE ENCOUNTER
metFORMIN (GLUCOPHAGE) 1000 MG tablet      Last Written Prescription Date:  9/11/20  Last Fill Quantity: 60,   # refills: 0  Last Office Visit: 9/11/20  Future Office visit:       Routing refill request to provider for review/approval because:

## 2020-10-31 DIAGNOSIS — J30.1 SEASONAL ALLERGIC RHINITIS DUE TO POLLEN: ICD-10-CM

## 2020-11-01 NOTE — TELEPHONE ENCOUNTER
FLONASE      Last Written Prescription Date:  9-1-2020  Last Fill Quantity: 16 GRAM ,   # refills: 1  Last Office Visit: 9-  Future Office visit:       Routing refill request to provider for review/approval because:

## 2020-11-02 RX ORDER — FLUTICASONE PROPIONATE 50 MCG
SPRAY, SUSPENSION (ML) NASAL
Qty: 16 G | Refills: 1 | Status: SHIPPED | OUTPATIENT
Start: 2020-11-02 | End: 2020-12-28

## 2020-12-11 DIAGNOSIS — E11.65 TYPE 2 DIABETES MELLITUS WITH HYPERGLYCEMIA, WITHOUT LONG-TERM CURRENT USE OF INSULIN (H): ICD-10-CM

## 2020-12-11 RX ORDER — DULAGLUTIDE 0.75 MG/.5ML
INJECTION, SOLUTION SUBCUTANEOUS
Qty: 2 ML | Refills: 3 | Status: SHIPPED | OUTPATIENT
Start: 2020-12-11 | End: 2021-02-26 | Stop reason: DRUGHIGH

## 2020-12-11 NOTE — TELEPHONE ENCOUNTER
dulaglutide (TRULICITY) 0.75 MG/0.5ML pen      Last Written Prescription Date:  08/29/20  Last Fill Quantity: 2 ml,   # refills: 3  Last Office Visit: 9/11/20  Future Office visit:       Routing refill request to provider for review/approval

## 2020-12-18 ENCOUNTER — TELEPHONE (OUTPATIENT)
Dept: FAMILY MEDICINE | Facility: OTHER | Age: 50
End: 2020-12-18

## 2020-12-18 ENCOUNTER — OFFICE VISIT (OUTPATIENT)
Dept: FAMILY MEDICINE | Facility: OTHER | Age: 50
End: 2020-12-18
Attending: FAMILY MEDICINE
Payer: COMMERCIAL

## 2020-12-18 VITALS
HEART RATE: 86 BPM | WEIGHT: 194 LBS | TEMPERATURE: 98.2 F | SYSTOLIC BLOOD PRESSURE: 150 MMHG | BODY MASS INDEX: 36.66 KG/M2 | OXYGEN SATURATION: 98 % | DIASTOLIC BLOOD PRESSURE: 108 MMHG

## 2020-12-18 DIAGNOSIS — H65.91 OME (OTITIS MEDIA WITH EFFUSION), RIGHT: Primary | ICD-10-CM

## 2020-12-18 PROCEDURE — 99213 OFFICE O/P EST LOW 20 MIN: CPT | Performed by: FAMILY MEDICINE

## 2020-12-18 RX ORDER — AZITHROMYCIN 250 MG/1
TABLET, FILM COATED ORAL
Qty: 6 TABLET | Refills: 0 | Status: SHIPPED | OUTPATIENT
Start: 2020-12-18 | End: 2021-09-24

## 2020-12-18 ASSESSMENT — PAIN SCALES - GENERAL: PAINLEVEL: MILD PAIN (3)

## 2020-12-18 NOTE — PROGRESS NOTES
Subjective     Edith Doty is a 49 year old female who presents to clinic today for the following health issues:    HPI         Concern - Ear pain   Onset: last Saturday . Just getting over a cold.   Description: right ear   Intensity: mild  Progression of Symptoms:  worsening  Accompanying Signs & Symptoms: right sided ear pain, feels fluid, states her jaw is also hurting on the same side .   Previous history of similar problem: yes.   Precipitating factors:        Worsened by: laying down .   Alleviating factors:        Improved by: na   Therapies tried and outcome: Claritin and Flonase      Edith has had the symptoms described above. Denies fever, shaking, chills, nausea, vomiting, or diarrhea.  No household contacts are ill.      Review of Systems   Constitutional, HEENT, cardiovascular, pulmonary, gi and gu systems are negative, except as otherwise noted.      Objective    BP (!) 150/108   Pulse 86   Temp 98.2  F (36.8  C)   Wt 88 kg (194 lb)   SpO2 98%   BMI 36.66 kg/m    Body mass index is 36.66 kg/m .  Physical Exam  Vitals signs and nursing note reviewed.   Constitutional:       General: She is not in acute distress.     Appearance: She is well-developed.   HENT:      Head: Normocephalic and atraumatic.      Right Ear: External ear normal.      Left Ear: External ear normal.      Ears:      Comments: There is erythema, and effusion noted right ear  Eyes:      Conjunctiva/sclera: Conjunctivae normal.      Pupils: Pupils are equal, round, and reactive to light.   Neck:      Musculoskeletal: Neck supple.   Pulmonary:      Effort: Pulmonary effort is normal.      Breath sounds: Normal breath sounds.   Lymphadenopathy:      Cervical: No cervical adenopathy.   Skin:     General: Skin is warm and dry.   Neurological:      Mental Status: She is alert and oriented to person, place, and time.          No results found for any visits on 12/18/20.        Assessment & Plan     OME (otitis media with  "effusion), right  Azithromycin (Zithromax) as directed.  Continue nasal steroids and antihistamines.  Follow up with persistent symptoms.  - azithromycin (ZITHROMAX) 250 MG tablet; Two tablets first day, then one tablet daily for four days.     BMI:   Estimated body mass index is 36.66 kg/m  as calculated from the following:    Height as of 9/11/20: 1.549 m (5' 1\").    Weight as of this encounter: 88 kg (194 lb).   Weight management plan: Discussed healthy diet and exercise guidelines         See Patient Instructions    No follow-ups on file.    Gonzalo Pino MD  [facility] [place]    "

## 2020-12-18 NOTE — NURSING NOTE
"Chief Complaint   Patient presents with     Ear Problem       Initial BP (!) 150/108   Pulse 86   Temp 98.2  F (36.8  C)   Wt 88 kg (194 lb)   SpO2 98%   BMI 36.66 kg/m   Estimated body mass index is 36.66 kg/m  as calculated from the following:    Height as of 9/11/20: 1.549 m (5' 1\").    Weight as of this encounter: 88 kg (194 lb).  Medication Reconciliation: complete  Ann Malik  "

## 2020-12-22 ENCOUNTER — TELEPHONE (OUTPATIENT)
Dept: FAMILY MEDICINE | Facility: OTHER | Age: 50
End: 2020-12-22

## 2020-12-22 NOTE — TELEPHONE ENCOUNTER
See message below. Pt was started on a Z pac. Today she should be finishing the antibiotic. Please advise any new changes?

## 2020-12-22 NOTE — TELEPHONE ENCOUNTER
Zithromax is in your system for 2 weeks. Continue  Per Dr Pino.       Notified of this information pt states her ear feels like a thumping feeling not so much a pain. I advised again what PCP recommends she didn't seem to happy on the phone.

## 2020-12-22 NOTE — TELEPHONE ENCOUNTER
Patient reports she has been on antibiotics since last Friday and her ear is still bothering her. Please call patient.

## 2020-12-28 DIAGNOSIS — J30.1 SEASONAL ALLERGIC RHINITIS DUE TO POLLEN: ICD-10-CM

## 2020-12-28 RX ORDER — FLUTICASONE PROPIONATE 50 MCG
SPRAY, SUSPENSION (ML) NASAL
Qty: 16 G | Refills: 1 | Status: SHIPPED | OUTPATIENT
Start: 2020-12-28 | End: 2021-02-22

## 2020-12-28 NOTE — TELEPHONE ENCOUNTER
flonase      Last Written Prescription Date:  11/2/20  Last Fill Quantity: 16g,   # refills: 1  Last Office Visit: 12/18/20  Future Office visit:

## 2021-01-15 ENCOUNTER — HEALTH MAINTENANCE LETTER (OUTPATIENT)
Age: 51
End: 2021-01-15

## 2021-02-22 DIAGNOSIS — J30.1 SEASONAL ALLERGIC RHINITIS DUE TO POLLEN: ICD-10-CM

## 2021-02-22 RX ORDER — FLUTICASONE PROPIONATE 50 MCG
SPRAY, SUSPENSION (ML) NASAL
Qty: 16 G | Refills: 1 | Status: SHIPPED | OUTPATIENT
Start: 2021-02-22 | End: 2021-04-23

## 2021-02-22 NOTE — TELEPHONE ENCOUNTER
Flonase Nasal Spray  50mcg      Last Written Prescription Date:  12/28/20  Last Fill Quantity: 16g,   # refills: 1  Last Office Visit: 12/18/20  Future Office visit:       Routing refill request to provider for review/approval because:

## 2021-02-26 ENCOUNTER — TELEPHONE (OUTPATIENT)
Dept: EDUCATION SERVICES | Facility: HOSPITAL | Age: 51
End: 2021-02-26

## 2021-02-26 DIAGNOSIS — E11.65 TYPE 2 DIABETES MELLITUS WITH HYPERGLYCEMIA, WITHOUT LONG-TERM CURRENT USE OF INSULIN (H): Primary | ICD-10-CM

## 2021-02-26 RX ORDER — DULAGLUTIDE 1.5 MG/.5ML
1.5 INJECTION, SOLUTION SUBCUTANEOUS
Qty: 2 ML | Refills: 3 | Status: SHIPPED | OUTPATIENT
Start: 2021-02-26 | End: 2021-05-27

## 2021-02-26 NOTE — TELEPHONE ENCOUNTER
Pt calls her BG's have been elevated in the am's (170 & up) for 2-3 weeks. She has been watching her carbs. Please call to discuss.

## 2021-02-26 NOTE — TELEPHONE ENCOUNTER
Returned nathanael's call. She is taking Metformin and Trulicity 0.75 mg weekly.    Reports FBG at 125-170s with a couple of 200s and post-meals are 175-200s.    Will increase Trulicity to 1.5 mg weekly and follow in 3 weeks by phone.

## 2021-03-17 DIAGNOSIS — E11.9 TYPE 2 DIABETES MELLITUS WITHOUT COMPLICATION, WITHOUT LONG-TERM CURRENT USE OF INSULIN (H): ICD-10-CM

## 2021-03-17 RX ORDER — BLOOD SUGAR DIAGNOSTIC
STRIP MISCELLANEOUS
Qty: 100 STRIP | Refills: 3 | Status: SHIPPED | OUTPATIENT
Start: 2021-03-17 | End: 2021-09-24

## 2021-03-17 RX ORDER — LANCETS 33 GAUGE
EACH MISCELLANEOUS
Qty: 100 EACH | Refills: 4 | Status: SHIPPED | OUTPATIENT
Start: 2021-03-17 | End: 2021-09-28

## 2021-03-23 ENCOUNTER — IMMUNIZATION (OUTPATIENT)
Dept: FAMILY MEDICINE | Facility: OTHER | Age: 51
End: 2021-03-23
Attending: FAMILY MEDICINE
Payer: COMMERCIAL

## 2021-03-23 PROCEDURE — 91300 PR COVID VAC PFIZER DIL RECON 30 MCG/0.3 ML IM: CPT

## 2021-03-23 PROCEDURE — 0001A PR COVID VAC PFIZER DIL RECON 30 MCG/0.3 ML IM: CPT

## 2021-04-02 DIAGNOSIS — E11.9 TYPE 2 DIABETES MELLITUS WITHOUT COMPLICATION, WITHOUT LONG-TERM CURRENT USE OF INSULIN (H): ICD-10-CM

## 2021-04-05 NOTE — TELEPHONE ENCOUNTER
Metformin 1000 mg      Last Written Prescription Date:  10/19/20  Last Fill Quantity: 60,   # refills: 5  Last Office Visit: 12/18/20  Future Office visit:       Routing refill request to provider for review/approval because:

## 2021-04-13 ENCOUNTER — IMMUNIZATION (OUTPATIENT)
Dept: FAMILY MEDICINE | Facility: OTHER | Age: 51
End: 2021-04-13
Attending: FAMILY MEDICINE
Payer: COMMERCIAL

## 2021-04-13 PROCEDURE — 91300 PR COVID VAC PFIZER DIL RECON 30 MCG/0.3 ML IM: CPT

## 2021-04-13 PROCEDURE — 0002A PR COVID VAC PFIZER DIL RECON 30 MCG/0.3 ML IM: CPT

## 2021-04-18 ENCOUNTER — HEALTH MAINTENANCE LETTER (OUTPATIENT)
Age: 51
End: 2021-04-18

## 2021-04-21 DIAGNOSIS — J30.1 SEASONAL ALLERGIC RHINITIS DUE TO POLLEN: ICD-10-CM

## 2021-04-23 RX ORDER — FLUTICASONE PROPIONATE 50 MCG
SPRAY, SUSPENSION (ML) NASAL
Qty: 16 G | Refills: 1 | Status: SHIPPED | OUTPATIENT
Start: 2021-04-23 | End: 2021-06-15

## 2021-05-26 DIAGNOSIS — I10 BENIGN ESSENTIAL HYPERTENSION: ICD-10-CM

## 2021-05-26 DIAGNOSIS — E11.65 TYPE 2 DIABETES MELLITUS WITH HYPERGLYCEMIA, WITHOUT LONG-TERM CURRENT USE OF INSULIN (H): ICD-10-CM

## 2021-05-26 NOTE — TELEPHONE ENCOUNTER
amlodipine      Last Written Prescription Date:  09/11/2020  Last Fill Quantity: 90,   # refills: 2  Last Office Visit: 12/18/2020  Future Office visit:       trulicity      Last Written Prescription Date:  02/26/2021  Last Fill Quantity: 2ml,   # refills: 3

## 2021-05-27 RX ORDER — DULAGLUTIDE 1.5 MG/.5ML
INJECTION, SOLUTION SUBCUTANEOUS
Qty: 2 ML | Refills: 3 | Status: SHIPPED | OUTPATIENT
Start: 2021-05-27 | End: 2021-09-07

## 2021-05-27 RX ORDER — AMLODIPINE BESYLATE 10 MG/1
TABLET ORAL
Qty: 90 TABLET | Refills: 2 | Status: SHIPPED | OUTPATIENT
Start: 2021-05-27 | End: 2022-02-28

## 2021-06-14 DIAGNOSIS — J30.1 SEASONAL ALLERGIC RHINITIS DUE TO POLLEN: ICD-10-CM

## 2021-06-15 RX ORDER — FLUTICASONE PROPIONATE 50 MCG
SPRAY, SUSPENSION (ML) NASAL
Qty: 16 G | Refills: 3 | Status: SHIPPED | OUTPATIENT
Start: 2021-06-15 | End: 2021-09-08

## 2021-07-04 DIAGNOSIS — I10 BENIGN ESSENTIAL HYPERTENSION: ICD-10-CM

## 2021-07-06 RX ORDER — METOPROLOL SUCCINATE 100 MG/1
TABLET, EXTENDED RELEASE ORAL
Qty: 90 TABLET | Refills: 1 | Status: SHIPPED | OUTPATIENT
Start: 2021-07-06 | End: 2022-01-04

## 2021-07-06 NOTE — TELEPHONE ENCOUNTER
metoprolol      Last Written Prescription Date:  09/11/2020  Last Fill Quantity: 90,   # refills: 1  Last Office Visit: 12/18/2020  Future Office visit:

## 2021-08-25 DIAGNOSIS — E11.9 TYPE 2 DIABETES MELLITUS WITHOUT COMPLICATION, WITHOUT LONG-TERM CURRENT USE OF INSULIN (H): ICD-10-CM

## 2021-08-26 NOTE — TELEPHONE ENCOUNTER
Metformin       Last Written Prescription Date:  3/29/2020  Last Fill Quantity: 60,   # refills: 5  Last Office Visit: 12/18/2020  Future Office visit:

## 2021-09-03 DIAGNOSIS — I10 BENIGN ESSENTIAL HYPERTENSION: ICD-10-CM

## 2021-09-03 DIAGNOSIS — E11.65 TYPE 2 DIABETES MELLITUS WITH HYPERGLYCEMIA, WITHOUT LONG-TERM CURRENT USE OF INSULIN (H): ICD-10-CM

## 2021-09-03 RX ORDER — LISINOPRIL 10 MG/1
TABLET ORAL
Qty: 180 TABLET | Refills: 3 | Status: SHIPPED | OUTPATIENT
Start: 2021-09-03 | End: 2022-04-13

## 2021-09-03 NOTE — TELEPHONE ENCOUNTER
Failed protocol not on current med list    BP Readings from Last 3 Encounters:   12/18/20 (!) 150/108   09/11/20 (!) 144/88   08/27/20 144/88     Potassium   Date Value Ref Range Status   06/25/2018 4.0 3.4 - 5.3 mmol/L Final     Creatinine   Date Value Ref Range Status   06/25/2018 0.65 0.52 - 1.04 mg/dL Final       lisinopril (ZESTRIL) 10 MG tablet      Last Written Prescription Date:  9/11/20  Last Fill Quantity: 180,   # refills: 3  Last Office Visit: 12/18/20  Future Office visit:       Routing refill request to provider for review/approval because:  Drug not on the G, P or  Health refill protocol or controlled substance

## 2021-09-06 DIAGNOSIS — J30.1 SEASONAL ALLERGIC RHINITIS DUE TO POLLEN: ICD-10-CM

## 2021-09-07 RX ORDER — DULAGLUTIDE 1.5 MG/.5ML
INJECTION, SOLUTION SUBCUTANEOUS
Qty: 2 ML | Refills: 3 | Status: SHIPPED | OUTPATIENT
Start: 2021-09-07 | End: 2021-12-20

## 2021-09-07 NOTE — TELEPHONE ENCOUNTER
Trulicity      Last Written Prescription Date:  8.30.21  Last Fill Quantity: #2mL,   # refills: 0  Last Office Visit: 12.18.20  Future Office visit:       Routing refill request to provider for review/approval because:

## 2021-09-08 RX ORDER — FLUTICASONE PROPIONATE 50 MCG
SPRAY, SUSPENSION (ML) NASAL
Qty: 16 G | Refills: 3 | Status: SHIPPED | OUTPATIENT
Start: 2021-09-08 | End: 2022-01-01

## 2021-09-08 NOTE — TELEPHONE ENCOUNTER
Flonase      Last Written Prescription Date:  06/15/21  Last Fill Quantity: 16g,   # refills: 3  Last Office Visit: 12/18/20  Future Office visit:

## 2021-09-08 NOTE — TELEPHONE ENCOUNTER
Next 5 appointments (look out 90 days)      Sep 24, 2021  4:00 PM  (Arrive by 3:45 PM)  SHORT with Gonzalo Pino MD  [facility] [place] ([facility] [place] ) [address]  [place] [address]  [phone]          Patient scheduled

## 2021-09-22 DIAGNOSIS — E11.9 TYPE 2 DIABETES MELLITUS WITHOUT COMPLICATION, WITHOUT LONG-TERM CURRENT USE OF INSULIN (H): ICD-10-CM

## 2021-09-23 ENCOUNTER — LAB (OUTPATIENT)
Dept: LAB | Facility: OTHER | Age: 51
End: 2021-09-23
Payer: COMMERCIAL

## 2021-09-23 DIAGNOSIS — I10 BENIGN ESSENTIAL HYPERTENSION: ICD-10-CM

## 2021-09-23 DIAGNOSIS — E11.9 TYPE 2 DIABETES MELLITUS WITHOUT COMPLICATION, WITHOUT LONG-TERM CURRENT USE OF INSULIN (H): ICD-10-CM

## 2021-09-23 DIAGNOSIS — E11.9 TYPE 2 DIABETES MELLITUS WITHOUT COMPLICATION, WITHOUT LONG-TERM CURRENT USE OF INSULIN (H): Primary | ICD-10-CM

## 2021-09-23 LAB
ALBUMIN SERPL-MCNC: 3.8 G/DL (ref 3.4–5)
ALP SERPL-CCNC: 75 U/L (ref 40–150)
ALT SERPL W P-5'-P-CCNC: 170 U/L (ref 0–50)
ANION GAP SERPL CALCULATED.3IONS-SCNC: 9 MMOL/L (ref 3–14)
AST SERPL W P-5'-P-CCNC: 255 U/L (ref 0–45)
BASOPHILS # BLD AUTO: 0 10E3/UL (ref 0–0.2)
BASOPHILS NFR BLD AUTO: 1 %
BILIRUB SERPL-MCNC: 2 MG/DL (ref 0.2–1.3)
BUN SERPL-MCNC: 11 MG/DL (ref 7–30)
CALCIUM SERPL-MCNC: 8.6 MG/DL (ref 8.5–10.1)
CHLORIDE BLD-SCNC: 104 MMOL/L (ref 94–109)
CHOLEST SERPL-MCNC: 178 MG/DL
CO2 SERPL-SCNC: 21 MMOL/L (ref 20–32)
CREAT SERPL-MCNC: 0.66 MG/DL (ref 0.52–1.04)
EOSINOPHIL # BLD AUTO: 0.2 10E3/UL (ref 0–0.7)
EOSINOPHIL NFR BLD AUTO: 2 %
ERYTHROCYTE [DISTWIDTH] IN BLOOD BY AUTOMATED COUNT: 11.9 % (ref 10–15)
EST. AVERAGE GLUCOSE BLD GHB EST-MCNC: 134 MG/DL
FASTING STATUS PATIENT QL REPORTED: YES
GFR SERPL CREATININE-BSD FRML MDRD: >90 ML/MIN/1.73M2
GLUCOSE BLD-MCNC: 129 MG/DL (ref 70–99)
HBA1C MFR BLD: 6.3 % (ref 0–5.6)
HCT VFR BLD AUTO: 41.9 % (ref 35–47)
HDLC SERPL-MCNC: 53 MG/DL
HGB BLD-MCNC: 14.5 G/DL (ref 11.7–15.7)
HOLD SPECIMEN: NORMAL
HOLD SPECIMEN: NORMAL
IMM GRANULOCYTES # BLD: 0 10E3/UL
IMM GRANULOCYTES NFR BLD: 0 %
LDLC SERPL CALC-MCNC: 69 MG/DL
LYMPHOCYTES # BLD AUTO: 2.4 10E3/UL (ref 0.8–5.3)
LYMPHOCYTES NFR BLD AUTO: 28 %
MCH RBC QN AUTO: 31.9 PG (ref 26.5–33)
MCHC RBC AUTO-ENTMCNC: 34.6 G/DL (ref 31.5–36.5)
MCV RBC AUTO: 92 FL (ref 78–100)
MONOCYTES # BLD AUTO: 0.7 10E3/UL (ref 0–1.3)
MONOCYTES NFR BLD AUTO: 8 %
NEUTROPHILS # BLD AUTO: 5.4 10E3/UL (ref 1.6–8.3)
NEUTROPHILS NFR BLD AUTO: 61 %
NONHDLC SERPL-MCNC: 125 MG/DL
NRBC # BLD AUTO: 0 10E3/UL
NRBC BLD AUTO-RTO: 0 /100
PLAT MORPH BLD: ABNORMAL
PLATELET # BLD AUTO: 110 10E3/UL (ref 150–450)
POTASSIUM BLD-SCNC: 4.7 MMOL/L (ref 3.4–5.3)
PROT SERPL-MCNC: 7.5 G/DL (ref 6.8–8.8)
RBC # BLD AUTO: 4.55 10E6/UL (ref 3.8–5.2)
RBC MORPH BLD: ABNORMAL
SODIUM SERPL-SCNC: 134 MMOL/L (ref 133–144)
TRIGL SERPL-MCNC: 279 MG/DL
TSH SERPL DL<=0.005 MIU/L-ACNC: 2.29 MU/L (ref 0.4–4)
WBC # BLD AUTO: 8.7 10E3/UL (ref 4–11)

## 2021-09-23 PROCEDURE — 80050 GENERAL HEALTH PANEL: CPT

## 2021-09-23 PROCEDURE — 80061 LIPID PANEL: CPT

## 2021-09-23 PROCEDURE — 83036 HEMOGLOBIN GLYCOSYLATED A1C: CPT

## 2021-09-23 PROCEDURE — 36415 COLL VENOUS BLD VENIPUNCTURE: CPT

## 2021-09-23 NOTE — TELEPHONE ENCOUNTER
Test strips      Last Written Prescription Date:  03/17/21  Last Fill Quantity: 100,   # refills: 3  Last Office Visit: 12/18/20  Future Office visit:    Next 5 appointments (look out 90 days)    Sep 24, 2021  4:00 PM  (Arrive by 3:45 PM)  SHORT with Gonzalo Pino MD  [facility] [place] ([facility] [place] ) [address]  [place] [address]  [phone]

## 2021-09-24 ENCOUNTER — OFFICE VISIT (OUTPATIENT)
Dept: FAMILY MEDICINE | Facility: OTHER | Age: 51
End: 2021-09-24
Attending: FAMILY MEDICINE
Payer: COMMERCIAL

## 2021-09-24 VITALS
RESPIRATION RATE: 20 BRPM | DIASTOLIC BLOOD PRESSURE: 89 MMHG | BODY MASS INDEX: 35.52 KG/M2 | SYSTOLIC BLOOD PRESSURE: 178 MMHG | HEART RATE: 84 BPM | WEIGHT: 188 LBS | TEMPERATURE: 97.8 F | OXYGEN SATURATION: 99 %

## 2021-09-24 DIAGNOSIS — I10 BENIGN ESSENTIAL HYPERTENSION: Primary | ICD-10-CM

## 2021-09-24 DIAGNOSIS — E11.9 TYPE 2 DIABETES MELLITUS WITHOUT COMPLICATION, WITHOUT LONG-TERM CURRENT USE OF INSULIN (H): ICD-10-CM

## 2021-09-24 PROCEDURE — 99214 OFFICE O/P EST MOD 30 MIN: CPT | Performed by: FAMILY MEDICINE

## 2021-09-24 RX ORDER — BLOOD SUGAR DIAGNOSTIC
STRIP MISCELLANEOUS
Qty: 100 STRIP | Refills: 3 | Status: SHIPPED | OUTPATIENT
Start: 2021-09-24 | End: 2022-02-22

## 2021-09-24 RX ORDER — LEVOTHYROXINE SODIUM 150 UG/1
125 TABLET ORAL DAILY
COMMUNITY
Start: 2021-07-23

## 2021-09-24 RX ORDER — METOPROLOL SUCCINATE 50 MG/1
50 TABLET, EXTENDED RELEASE ORAL DAILY
Qty: 90 TABLET | Refills: 1 | Status: SHIPPED | OUTPATIENT
Start: 2021-09-24 | End: 2021-12-20

## 2021-09-24 NOTE — NURSING NOTE
"Chief Complaint   Patient presents with     Recheck Medication       Initial BP (!) 178/89 (BP Location: Left arm, Patient Position: Sitting, Cuff Size: Adult Large)   Pulse 84   Temp 97.8  F (36.6  C) (Tympanic)   Resp 20   Wt 85.3 kg (188 lb)   LMP 03/07/2015 (Exact Date)   SpO2 99%   BMI 35.52 kg/m   Estimated body mass index is 35.52 kg/m  as calculated from the following:    Height as of 9/11/20: 1.549 m (5' 1\").    Weight as of this encounter: 85.3 kg (188 lb).  Medication Reconciliation: complete  Catarina Thakur LPN  "

## 2021-09-24 NOTE — PROGRESS NOTES
SUBJECTIVE:   Edith Doty is a 49 year old female who presents to clinic today for the following health issues:    Diabetes Follow-up    Patient is checking blood sugars: once daily.  Results are as follows:         am - 130's    Diabetic concerns: None     Symptoms of hypoglycemia (low blood sugar): shaky, dizzy, weak     Paresthesias (numbness or burning in feet) or sores: No     Date of last diabetic eye exam: prior    BP Readings from Last 2 Encounters:   09/24/21 (!) 178/89   12/18/20 (!) 150/108     Hemoglobin A1C (%)   Date Value   09/23/2021 6.3 (H)   08/27/2020 7.1 (A)   08/07/2019 6.5 (A)     LDL Cholesterol Calculated (mg/dL)   Date Value   09/23/2021 69   04/08/2019 78   08/15/2017 63     Hypertension Follow-up      Outpatient blood pressures are being checked at home.  Results are mildly elevated.    Low Salt Diet: no added salt      Amount of exercise or physical activity: None    Problems taking medications regularly: No    Medication side effects: none    Diet: diabetic    Problem list and histories reviewed & adjusted, as indicated.  Additional history: as documented    Patient Active Problem List   Diagnosis     Hypothyroidism, acquired     Asthma, mild intermittent     Allergic rhinitis due to pollen     HTN (hypertension)     Nontoxic uninodular goiter     Obesity     Type 2 diabetes mellitus without complication, without long-term current use of insulin (H)     Achilles tendon tear     Asthma     Chronic lymphocytic thyroiditis     Hypertension     Adjustment disorder with anxious mood     Past Surgical History:   Procedure Laterality Date     CHOLECYSTECTOMY  01/01/1996     eye surgery for strabismus at ages 2 & 4       HYSTERECTOMY TOTAL ABDOMINAL, SALPINGECTOMY Bilateral 07/02/2018    [facility]. Ovaries remain. 10 cm uterine mass removed: well circumscribed mass consistent with leiomyoma.     HYSTERECTOMY, PAP NO LONGER INDICATED N/A 07/02/2018    Cervix removed per Hyst path  report.       Social History     Tobacco Use     Smoking status: Never Smoker     Smokeless tobacco: Never Used     Tobacco comment: no passive exposure   Substance Use Topics     Alcohol use: Yes     Alcohol/week: 0.0 standard drinks     Comment: socially      Family History   Problem Relation Age of Onset     Diabetes Father      Other - See Comments Sister         endometriosis     Allergies Sister         environmental     Other - See Comments Mother         fibromyalgia and endometreosis     Heart Disease Mother         murmer     Allergies Mother         environmental     Hypertension Brother      Allergies Brother         environmental     Asthma No family hx of      Thyroid Disease No family hx of          Current Outpatient Medications   Medication Sig Dispense Refill     albuterol (PROAIR HFA) 108 (90 Base) MCG/ACT inhaler Inhale 2 puffs into the lungs every 6 hours 18 g 3     amLODIPine (NORVASC) 10 MG tablet TAKE 1 TABLET(10 MG) BY MOUTH DAILY 90 tablet 2     blood glucose (ONETOUCH VERIO IQ) test strip TESTING TWICE DAILY 100 strip 3     fluticasone (FLONASE) 50 MCG/ACT nasal spray SHAKE LIQUID AND USE 2 SPRAYS IN EACH NOSTRIL DAILY 16 g 3     Lancets (ONETOUCH DELICA PLUS [id number]) MISC USE TO TEST TWICE DAILY. 100 each 4     levothyroxine (SYNTHROID/LEVOTHROID) 150 MCG tablet Take 150 mcg by mouth       lisinopril (ZESTRIL) 10 MG tablet TAKE 1 TABLET(10 MG) BY MOUTH TWICE DAILY 180 tablet 3     metFORMIN (GLUCOPHAGE) 1000 MG tablet TAKE 1 TABLET BY MOUTH TWICE DAILY WITH MEALS 60 tablet 5     metoprolol succinate ER (TOPROL-XL) 100 MG 24 hr tablet TAKE 1 TABLET(100 MG) BY MOUTH DAILY 90 tablet 1     TRULICITY 1.5 MG/0.5ML pen ADMINISTER 1.5 MG UNDER THE SKIN EVERY 7 DAYS 2 mL 3     albuterol (2.5 MG/3ML) 0.083% nebulizer solution USE 1 VIAL BY NEBULIZATION EVERY 6 HOURS AS NEEDED FOR SHORTNESS OF BREATH / DYSPNEA OR WHEEZING (Patient not taking: Reported on 9/24/2021) 90 mL 3     Allergies   Allergen  Reactions     Amoxicillin Trihydrate Itching     Augmentin       Clavulanic Acid Potassium Itching     Augmentin       Levaquin [Levofloxacin] Swelling     Penicillins Hives       Reviewed and updated as needed this visit by clinical staff  Tobacco  Allergies  Meds   Med Hx  Surg Hx  Fam Hx  Soc Hx      Reviewed and updated as needed this visit by Provider                 ROS:  Constitutional, HEENT, cardiovascular, pulmonary, gi and gu systems are negative, except as otherwise noted.    OBJECTIVE:     BP (!) 178/89 (BP Location: Left arm, Patient Position: Sitting, Cuff Size: Adult Large)   Pulse 84   Temp 97.8  F (36.6  C) (Tympanic)   Resp 20   Wt 85.3 kg (188 lb)   LMP 03/07/2015 (Exact Date)   SpO2 99%   BMI 35.52 kg/m    Body mass index is 35.52 kg/m .  Physical Exam  Constitutional:       General: She is not in acute distress.     Appearance: Normal appearance. She is well-developed.   Neurological:      Mental Status: She is alert and oriented to person, place, and time.   Psychiatric:         Mood and Affect: Mood normal.         Behavior: Behavior normal.         Thought Content: Thought content normal.         Other exam not repeated.  Diagnostic Test Results:  none     ASSESSMENT/PLAN:       Type 2 diabetes mellitus without complication, without long-term current use of insulin (H)  Fasting labs are reviewed.  Goal of hemoglobin A1C of less than 7 discussed.  Instructed in the importance of diet, exercise, and good glycemic control in prevention of secondary complications.    Continue same medication regimen. Repeat fasting glucose and hemoglobin A1C in 3 months.   - metFORMIN (GLUCOPHAGE) 1000 MG tablet; TAKE 1 TABLET BY MOUTH TWICE DAILY WITH MEALS    HTN (hypertension)  Goals reviewed.  Continue home BP monitoring and same medication regimen.  Follow up 6 months.   - metoprolol succinate ER (TOPROL-XL) 100 MG 24 hr tablet; Take 1 tablet (100 mg) by mouth daily  - lisinopril (ZESTRIL) 10  MG tablet; Take 1 tablet (10 mg) by mouth 2 times daily  - amLODIPine (NORVASC) 10 MG tablet; Take 1 tablet (10 mg) by mouth daily            Gonzalo Pino MD  [facility]

## 2021-09-26 DIAGNOSIS — E11.9 TYPE 2 DIABETES MELLITUS WITHOUT COMPLICATION, WITHOUT LONG-TERM CURRENT USE OF INSULIN (H): ICD-10-CM

## 2021-09-28 RX ORDER — LANCETS 33 GAUGE
EACH MISCELLANEOUS
Qty: 100 EACH | Refills: 4 | Status: SHIPPED | OUTPATIENT
Start: 2021-09-28 | End: 2022-10-28

## 2021-10-03 ENCOUNTER — HEALTH MAINTENANCE LETTER (OUTPATIENT)
Age: 51
End: 2021-10-03

## 2021-10-08 ENCOUNTER — TELEPHONE (OUTPATIENT)
Dept: FAMILY MEDICINE | Facility: OTHER | Age: 51
End: 2021-10-08

## 2021-10-08 DIAGNOSIS — F43.22 ADJUSTMENT DISORDER WITH ANXIOUS MOOD: Primary | ICD-10-CM

## 2021-10-08 RX ORDER — LORAZEPAM 0.5 MG/1
0.5 TABLET ORAL EVERY 6 HOURS PRN
Qty: 30 TABLET | Refills: 0 | Status: SHIPPED | OUTPATIENT
Start: 2021-10-08 | End: 2022-04-13

## 2021-10-08 NOTE — TELEPHONE ENCOUNTER
Spoke with patient she is experiencing severe anxiety and feeling panicky d/t several life situations happening at once,asking for something to help with symptoms. Ativan pended if you wish, Please advise.  Laya Parsons LPN

## 2021-10-08 NOTE — TELEPHONE ENCOUNTER
7:33 AM    Reason for Call: Phone Call    Description: pt would like to get worked in today for anxiety and panic/ please call pt    Was an appointment offered for this call? No  If yes : Appointment type              Date    Preferred method for responding to this message: Telephone Call  What is your phone number ? [phone]    If we cannot reach you directly, may we leave a detailed response at the number you provided? Yes    Can this message wait until your PCP/provider returns, if available today? YES, No, provider is in    Denise Ribeiro

## 2021-12-18 DIAGNOSIS — E11.65 TYPE 2 DIABETES MELLITUS WITH HYPERGLYCEMIA, WITHOUT LONG-TERM CURRENT USE OF INSULIN (H): ICD-10-CM

## 2021-12-20 DIAGNOSIS — I10 BENIGN ESSENTIAL HYPERTENSION: ICD-10-CM

## 2021-12-20 RX ORDER — DULAGLUTIDE 1.5 MG/.5ML
INJECTION, SOLUTION SUBCUTANEOUS
Qty: 2 ML | Refills: 3 | Status: SHIPPED | OUTPATIENT
Start: 2021-12-20 | End: 2022-04-13

## 2021-12-20 RX ORDER — METOPROLOL SUCCINATE 50 MG/1
TABLET, EXTENDED RELEASE ORAL
Qty: 90 TABLET | Refills: 1 | Status: SHIPPED | OUTPATIENT
Start: 2021-12-20 | End: 2022-04-13

## 2021-12-20 NOTE — TELEPHONE ENCOUNTER
Trulicity      Last Written Prescription Date:  09/07/21  Last Fill Quantity: 2ml,   # refills: 3  Last Office Visit: 09/24/21  Future Office visit:

## 2021-12-20 NOTE — TELEPHONE ENCOUNTER
Toprol    50mg  Last Written Prescription Date:  09/24/21  Last Fill Quantity: 90,   # refills: 1  Last Office Visit: 09/24/21  Future Office visit:         Med list also has Toprol 100mg daily on it.

## 2021-12-29 DIAGNOSIS — I10 BENIGN ESSENTIAL HYPERTENSION: ICD-10-CM

## 2021-12-30 DIAGNOSIS — J30.1 SEASONAL ALLERGIC RHINITIS DUE TO POLLEN: ICD-10-CM

## 2021-12-30 NOTE — TELEPHONE ENCOUNTER
Flonase      Last Written Prescription Date:  9/8/21  Last Fill Quantity: 16 g,   # refills: 3  Last Office Visit: 9/24/21  Future Office visit:       Routing refill request to provider for review/approval because:

## 2022-01-01 RX ORDER — FLUTICASONE PROPIONATE 50 MCG
SPRAY, SUSPENSION (ML) NASAL
Qty: 16 G | Refills: 3 | Status: SHIPPED | OUTPATIENT
Start: 2022-01-01 | End: 2022-02-28

## 2022-01-03 DIAGNOSIS — E11.9 TYPE 2 DIABETES MELLITUS WITHOUT COMPLICATION, WITHOUT LONG-TERM CURRENT USE OF INSULIN (H): ICD-10-CM

## 2022-01-04 RX ORDER — METOPROLOL SUCCINATE 100 MG/1
TABLET, EXTENDED RELEASE ORAL
Qty: 90 TABLET | Refills: 1 | Status: SHIPPED | OUTPATIENT
Start: 2022-01-04 | End: 2022-04-13

## 2022-01-04 NOTE — TELEPHONE ENCOUNTER
metformin      Last Written Prescription Date:  8/26/21  Last Fill Quantity: 60,   # refills: 5  Last Office Visit: 9/24/21  Future Office visit:

## 2022-01-04 NOTE — TELEPHONE ENCOUNTER
This is not a duplicate as listed below. Pt reports she is taking metoprolol 50 and 100 mg. Medications signed. Pt then transferred to scheduling to establish care. Pt verbalizes understanding the importance of establishing care.

## 2022-01-13 ENCOUNTER — TRANSFERRED RECORDS (OUTPATIENT)
Dept: HEALTH INFORMATION MANAGEMENT | Facility: CLINIC | Age: 52
End: 2022-01-13

## 2022-01-13 LAB — RETINOPATHY: NORMAL

## 2022-01-22 ENCOUNTER — HEALTH MAINTENANCE LETTER (OUTPATIENT)
Age: 52
End: 2022-01-22

## 2022-02-01 ENCOUNTER — TRANSFERRED RECORDS (OUTPATIENT)
Dept: MULTI SPECIALTY CLINIC | Facility: CLINIC | Age: 52
End: 2022-02-01

## 2022-02-01 LAB — RETINOPATHY: NORMAL

## 2022-02-20 DIAGNOSIS — E11.9 TYPE 2 DIABETES MELLITUS WITHOUT COMPLICATION, WITHOUT LONG-TERM CURRENT USE OF INSULIN (H): ICD-10-CM

## 2022-02-21 NOTE — TELEPHONE ENCOUNTER
Test strips      Last Written Prescription Date:  9/24/21  Last Fill Quantity: 100,   # refills: 3  Last Office Visit: 9/24/21  Future Office visit:       Routing refill request to provider for review/approval because:

## 2022-02-22 RX ORDER — BLOOD SUGAR DIAGNOSTIC
STRIP MISCELLANEOUS
Qty: 100 STRIP | Refills: 3 | Status: SHIPPED | OUTPATIENT
Start: 2022-02-22 | End: 2022-05-10

## 2022-02-23 ENCOUNTER — TELEPHONE (OUTPATIENT)
Dept: FAMILY MEDICINE | Facility: OTHER | Age: 52
End: 2022-02-23
Payer: COMMERCIAL

## 2022-02-23 NOTE — TELEPHONE ENCOUNTER
Received a PA from [employer] for OneHelloTel Verio strips. Submitted on [facility]. Waiting for a response.

## 2022-02-24 NOTE — TELEPHONE ENCOUNTER
Received a DENIAL from [facility] for OneTouch Verio strips.     Forms scanned to Epic.

## 2022-02-25 DIAGNOSIS — J30.1 SEASONAL ALLERGIC RHINITIS DUE TO POLLEN: ICD-10-CM

## 2022-02-26 DIAGNOSIS — I10 BENIGN ESSENTIAL HYPERTENSION: ICD-10-CM

## 2022-02-27 NOTE — TELEPHONE ENCOUNTER
NORVASC    APPT WITH [facility] 4-9-2022  Last Written Prescription Date:  5-  Last Fill Quantity: 90,   # refills: 2  Last Office Visit: 9-  Future Office visit:       Routing refill request to provider for review/approval because:

## 2022-02-28 RX ORDER — AMLODIPINE BESYLATE 10 MG/1
TABLET ORAL
Qty: 90 TABLET | Refills: 2 | Status: SHIPPED | OUTPATIENT
Start: 2022-02-28 | End: 2022-04-13

## 2022-02-28 RX ORDER — FLUTICASONE PROPIONATE 50 MCG
SPRAY, SUSPENSION (ML) NASAL
Qty: 16 G | Refills: 3 | Status: SHIPPED | OUTPATIENT
Start: 2022-02-28 | End: 2022-09-22

## 2022-03-24 ENCOUNTER — HOSPITAL ENCOUNTER (EMERGENCY)
Facility: HOSPITAL | Age: 52
Discharge: HOME | End: 2022-03-24
Attending: NURSE PRACTITIONER | Admitting: NURSE PRACTITIONER
Payer: COMMERCIAL

## 2022-03-24 VITALS
DIASTOLIC BLOOD PRESSURE: 96 MMHG | RESPIRATION RATE: 18 BRPM | TEMPERATURE: 98.2 F | SYSTOLIC BLOOD PRESSURE: 169 MMHG | HEART RATE: 82 BPM | OXYGEN SATURATION: 99 %

## 2022-03-24 DIAGNOSIS — H60.91 OTITIS EXTERNA OF RIGHT EAR: Primary | ICD-10-CM

## 2022-03-24 DIAGNOSIS — H60.91 OTITIS EXTERNA OF RIGHT EAR, UNSPECIFIED CHRONICITY, UNSPECIFIED TYPE: ICD-10-CM

## 2022-03-24 PROCEDURE — G0463 HOSPITAL OUTPT CLINIC VISIT: HCPCS

## 2022-03-24 PROCEDURE — 99213 OFFICE O/P EST LOW 20 MIN: CPT | Performed by: NURSE PRACTITIONER

## 2022-03-24 RX ORDER — NEOMYCIN SULFATE, POLYMYXIN B SULFATE, HYDROCORTISONE 3.5; 10000; 1 MG/ML; [USP'U]/ML; MG/ML
3 SOLUTION/ DROPS AURICULAR (OTIC) 4 TIMES DAILY
Qty: 5 ML | Refills: 0 | Status: SHIPPED | OUTPATIENT
Start: 2022-03-24 | End: 2022-03-31

## 2022-03-24 ASSESSMENT — ENCOUNTER SYMPTOMS
FEVER: 0
CHILLS: 0

## 2022-03-24 NOTE — ED PROVIDER NOTES
History     Chief Complaint   Patient presents with     Otalgia     HPI  Edith Doty is a 51 year old female who presents ambulatory to urgent care for concerns of right ear infection.  Patient tells me she started having pain to her right ear on Monday.  Pain has not worsened but is constant.  Sometimes she does feel like there is water in her ear.  No ear drainage.  No fever or chills.  She has never had surgeries to her ears.  She tells me that she has had more ear infections as an adult than as a child.  States does not clean her ears with Q-tips that often.  No changes to her hearing.    Allergies:  Allergies   Allergen Reactions     Amoxicillin Trihydrate Itching     Augmentin       Clavulanic Acid Potassium Itching     Augmentin       Levaquin [Levofloxacin] Swelling     Penicillins Hives       Problem List:    Patient Active Problem List    Diagnosis Date Noted     Adjustment disorder with anxious mood 05/12/2019     Priority: Medium     Type 2 diabetes mellitus without complication, without long-term current use of insulin (H) 08/30/2017     Priority: Medium     Obesity 03/02/2014     Priority: Medium     Allergic rhinitis due to pollen 04/25/2011     Priority: Medium     HTN (hypertension) 04/25/2011     Priority: Medium     Hypothyroidism, acquired 01/01/2011     Priority: Medium     Asthma, mild intermittent 01/01/2011     Priority: Medium     Asthma 10/29/2009     Priority: Medium     Overview:   Seasonal. [facility] record.        Hypertension 10/29/2009     Priority: Medium     Overview:   [facility] record.        Achilles tendon tear 10/28/2009     Priority: Medium     Chronic lymphocytic thyroiditis 01/28/2009     Priority: Medium     Nontoxic uninodular goiter 11/28/2007     Priority: Medium        Past Medical History:    Past Medical History:   Diagnosis Date     Allergic Rhinitis, Seasonal 04/25/2011     Asthma 01/01/2011     Hypertension, Benign 04/25/2011     Hypothyroidism  01/01/2011     Nontoxic uninodular goiter 11/28/2007     Prediabetes 04/25/2011       Past Surgical History:    Past Surgical History:   Procedure Laterality Date     CHOLECYSTECTOMY  01/01/1996     eye surgery for strabismus at ages 2 & 4       HYSTERECTOMY TOTAL ABDOMINAL, SALPINGECTOMY Bilateral 07/02/2018    [facility]. Ovaries remain. 10 cm uterine mass removed: well circumscribed mass consistent with leiomyoma.     HYSTERECTOMY, PAP NO LONGER INDICATED N/A 07/02/2018    Cervix removed per Hyst path report.       Family History:    Family History   Problem Relation Age of Onset     Diabetes Father      Other - See Comments Sister         endometriosis     Allergies Sister         environmental     Other - See Comments Mother         fibromyalgia and endometreosis     Heart Disease Mother         murmer     Allergies Mother         environmental     Hypertension Brother      Allergies Brother         environmental     Asthma No family hx of      Thyroid Disease No family hx of        Social History:  Marital Status:   [2]  Social History     Tobacco Use     Smoking status: Never Smoker     Smokeless tobacco: Never Used     Tobacco comment: no passive exposure   Substance Use Topics     Alcohol use: Yes     Alcohol/week: 0.0 standard drinks     Comment: socially      Drug use: No        Medications:    albuterol (PROAIR HFA) 108 (90 Base) MCG/ACT inhaler  amLODIPine (NORVASC) 10 MG tablet  fluticasone (FLONASE) 50 MCG/ACT nasal spray  Lancets (ONETOUCH DELICA PLUS [id number]) MISC  levothyroxine (SYNTHROID/LEVOTHROID) 150 MCG tablet  lisinopril (ZESTRIL) 10 MG tablet  LORazepam (ATIVAN) 0.5 MG tablet  metFORMIN (GLUCOPHAGE) 1000 MG tablet  metoprolol succinate ER (TOPROL-XL) 100 MG 24 hr tablet  metoprolol succinate ER (TOPROL-XL) 50 MG 24 hr tablet  neomycin-polymyxin-hydrocortisone (CORTISPORIN) 3.5-07609-0 otic solution  ONETOUCH VERIO IQ test strip  TRULICITY 1.5 MG/0.5ML pen  albuterol (2.5  MG/3ML) 0.083% nebulizer solution          Review of Systems   Constitutional: Negative for chills and fever.   HENT: Positive for ear pain. Negative for ear discharge and hearing loss.    All other systems reviewed and are negative.      Physical Exam   BP: 169/96  Pulse: 82  Temp: 98.2  F (36.8  C)  Resp: 18  SpO2: 99 %      Physical Exam  Vitals and nursing note reviewed.   Constitutional:       Appearance: Normal appearance. She is not ill-appearing or toxic-appearing.   HENT:      Head: Normocephalic.      Right Ear: Tympanic membrane normal. Tenderness present. No drainage. There is no impacted cerumen. No foreign body. No mastoid tenderness. No hemotympanum. Tympanic membrane is not injected, scarred, perforated, erythematous or bulging.      Left Ear: Tympanic membrane and ear canal normal. There is no impacted cerumen.      Ears:      Comments: Redness to right ear canal with mild swelling.  Tenderness with insertion of otoscope.  Right TM is translucent.  No middle ear effusion appreciated.  Eyes:      Pupils: Pupils are equal, round, and reactive to light.   Cardiovascular:      Rate and Rhythm: Normal rate and regular rhythm.      Heart sounds: Normal heart sounds.   Pulmonary:      Effort: Pulmonary effort is normal. No respiratory distress.   Musculoskeletal:      Cervical back: Neck supple.   Neurological:      Mental Status: She is alert and oriented to person, place, and time.         ED Course                 Procedures            No results found for this or any previous visit (from the past 24 hour(s)).    Medications - No data to display    Assessments & Plan (with Medical Decision Making)     I have reviewed the nursing notes.    A pleasant 51-year-old female with right ear pain x4 days.  No changes to her hearing.  No ear drainage.  She does have redness and mild swelling to the right ear canal.  Right TM is translucent.  Patient is afebrile.    Patient will be treated for otitis externa with  Cortisporin eardrops.  Recommended Tylenol or ibuprofen as needed for pain.  Advised patient to try taking an oral decongestant as well as she reports feeling like there is fluid behind her eardrum.  Unable to appreciate this on physical exam.  Follow-up with PCP if no improvement in symptoms.  Return to ED/UC for worsening concerning symptoms.    I have reviewed the findings, diagnosis, plan and need for follow up with the patient.  This document was prepared using a combination of typing and voice generated software.  While every attempt was made for accuracy, spelling and grammatical errors may exist.    Discharge Medication List as of 3/24/2022  3:49 PM      START taking these medications    Details   neomycin-polymyxin-hydrocortisone (CORTISPORIN) 3.5-55239-8 otic solution Place 3 drops into the right ear 4 times daily for 7 days, Disp-5 mL, R-0, E-Prescribe             Final diagnoses:   Otitis externa of right ear       3/24/2022   [facility]     Mpofu, Prudence, CNP  03/24/22 1600

## 2022-03-24 NOTE — DISCHARGE INSTRUCTIONS
Apply ear drops to your ear as prescribed.     Take tylenol or ibuprofen as needed for pain.     Take an oral decongestant as well.     Follow up with your doctor if no improvement in symptoms.    Return to emergency department for worsening or concerning symptoms.

## 2022-03-24 NOTE — ED TRIAGE NOTES
Patient presents to urgent care for right ear pain which started this past Monday. Patient denies any fever or any other symptoms.

## 2022-04-05 DIAGNOSIS — I10 BENIGN ESSENTIAL HYPERTENSION: ICD-10-CM

## 2022-04-06 RX ORDER — METOPROLOL SUCCINATE 100 MG/1
TABLET, EXTENDED RELEASE ORAL
Qty: 90 TABLET | Refills: 1 | OUTPATIENT
Start: 2022-04-06

## 2022-04-13 ENCOUNTER — OFFICE VISIT (OUTPATIENT)
Dept: FAMILY MEDICINE | Facility: OTHER | Age: 52
End: 2022-04-13
Attending: PHYSICIAN ASSISTANT
Payer: COMMERCIAL

## 2022-04-13 ENCOUNTER — TELEPHONE (OUTPATIENT)
Dept: FAMILY MEDICINE | Facility: OTHER | Age: 52
End: 2022-04-13

## 2022-04-13 VITALS
OXYGEN SATURATION: 98 % | DIASTOLIC BLOOD PRESSURE: 96 MMHG | HEIGHT: 63 IN | SYSTOLIC BLOOD PRESSURE: 148 MMHG | TEMPERATURE: 98.8 F | WEIGHT: 202 LBS | RESPIRATION RATE: 16 BRPM | HEART RATE: 78 BPM | BODY MASS INDEX: 35.79 KG/M2

## 2022-04-13 DIAGNOSIS — I10 BENIGN ESSENTIAL HYPERTENSION: ICD-10-CM

## 2022-04-13 DIAGNOSIS — J30.1 SEASONAL ALLERGIC RHINITIS DUE TO POLLEN: ICD-10-CM

## 2022-04-13 DIAGNOSIS — R74.8 ELEVATED LIVER ENZYMES: ICD-10-CM

## 2022-04-13 DIAGNOSIS — E11.9 TYPE 2 DIABETES MELLITUS WITHOUT COMPLICATION, WITHOUT LONG-TERM CURRENT USE OF INSULIN (H): Primary | ICD-10-CM

## 2022-04-13 DIAGNOSIS — E11.65 TYPE 2 DIABETES MELLITUS WITH HYPERGLYCEMIA, WITHOUT LONG-TERM CURRENT USE OF INSULIN (H): ICD-10-CM

## 2022-04-13 DIAGNOSIS — D69.1 ABNORMAL PLATELETS (H): ICD-10-CM

## 2022-04-13 DIAGNOSIS — Z12.31 VISIT FOR SCREENING MAMMOGRAM: ICD-10-CM

## 2022-04-13 DIAGNOSIS — J45.20 MILD INTERMITTENT ASTHMA WITHOUT COMPLICATION: ICD-10-CM

## 2022-04-13 LAB
ALBUMIN SERPL-MCNC: 3.6 G/DL (ref 3.4–5)
ALP SERPL-CCNC: 70 U/L (ref 40–150)
ALT SERPL W P-5'-P-CCNC: 75 U/L (ref 0–50)
ANION GAP SERPL CALCULATED.3IONS-SCNC: 5 MMOL/L (ref 3–14)
AST SERPL W P-5'-P-CCNC: 45 U/L (ref 0–45)
BASOPHILS # BLD AUTO: 0.1 10E3/UL (ref 0–0.2)
BASOPHILS NFR BLD AUTO: 1 %
BILIRUB SERPL-MCNC: 1.5 MG/DL (ref 0.2–1.3)
BUN SERPL-MCNC: 13 MG/DL (ref 7–30)
CALCIUM SERPL-MCNC: 8.7 MG/DL (ref 8.5–10.1)
CHLORIDE BLD-SCNC: 104 MMOL/L (ref 94–109)
CO2 SERPL-SCNC: 27 MMOL/L (ref 20–32)
CREAT SERPL-MCNC: 0.68 MG/DL (ref 0.52–1.04)
CREAT UR-MCNC: 202 MG/DL
EOSINOPHIL # BLD AUTO: 0.2 10E3/UL (ref 0–0.7)
EOSINOPHIL NFR BLD AUTO: 3 %
ERYTHROCYTE [DISTWIDTH] IN BLOOD BY AUTOMATED COUNT: 12.2 % (ref 10–15)
EST. AVERAGE GLUCOSE BLD GHB EST-MCNC: 143 MG/DL
GFR SERPL CREATININE-BSD FRML MDRD: >90 ML/MIN/1.73M2
GLUCOSE BLD-MCNC: 122 MG/DL (ref 70–99)
HBA1C MFR BLD: 6.6 % (ref 0–5.6)
HCT VFR BLD AUTO: 42.5 % (ref 35–47)
HGB BLD-MCNC: 14.6 G/DL (ref 11.7–15.7)
IMM GRANULOCYTES # BLD: 0 10E3/UL
IMM GRANULOCYTES NFR BLD: 0 %
LYMPHOCYTES # BLD AUTO: 3.2 10E3/UL (ref 0.8–5.3)
LYMPHOCYTES NFR BLD AUTO: 36 %
MCH RBC QN AUTO: 31.6 PG (ref 26.5–33)
MCHC RBC AUTO-ENTMCNC: 34.4 G/DL (ref 31.5–36.5)
MCV RBC AUTO: 92 FL (ref 78–100)
MICROALBUMIN UR-MCNC: 148 MG/L
MICROALBUMIN/CREAT UR: 73.27 MG/G CR (ref 0–25)
MONOCYTES # BLD AUTO: 0.8 10E3/UL (ref 0–1.3)
MONOCYTES NFR BLD AUTO: 9 %
NEUTROPHILS # BLD AUTO: 4.6 10E3/UL (ref 1.6–8.3)
NEUTROPHILS NFR BLD AUTO: 51 %
NRBC # BLD AUTO: 0 10E3/UL
NRBC BLD AUTO-RTO: 0 /100
PLATELET # BLD AUTO: 308 10E3/UL (ref 150–450)
POTASSIUM BLD-SCNC: 3.5 MMOL/L (ref 3.4–5.3)
PROT SERPL-MCNC: 7.5 G/DL (ref 6.8–8.8)
RBC # BLD AUTO: 4.62 10E6/UL (ref 3.8–5.2)
RETICS # AUTO: 0.15 10E6/UL (ref 0.03–0.1)
RETICS/RBC NFR AUTO: 3.3 % (ref 0.5–2)
SODIUM SERPL-SCNC: 136 MMOL/L (ref 133–144)
TSH SERPL DL<=0.005 MIU/L-ACNC: 1.9 MU/L (ref 0.4–4)
WBC # BLD AUTO: 8.9 10E3/UL (ref 4–11)

## 2022-04-13 PROCEDURE — 83036 HEMOGLOBIN GLYCOSYLATED A1C: CPT | Performed by: PHYSICIAN ASSISTANT

## 2022-04-13 PROCEDURE — 85060 BLOOD SMEAR INTERPRETATION: CPT | Performed by: PATHOLOGY

## 2022-04-13 PROCEDURE — 36415 COLL VENOUS BLD VENIPUNCTURE: CPT | Performed by: PHYSICIAN ASSISTANT

## 2022-04-13 PROCEDURE — 82043 UR ALBUMIN QUANTITATIVE: CPT | Performed by: PHYSICIAN ASSISTANT

## 2022-04-13 PROCEDURE — 85045 AUTOMATED RETICULOCYTE COUNT: CPT | Performed by: PHYSICIAN ASSISTANT

## 2022-04-13 PROCEDURE — 80050 GENERAL HEALTH PANEL: CPT | Performed by: PHYSICIAN ASSISTANT

## 2022-04-13 PROCEDURE — 99214 OFFICE O/P EST MOD 30 MIN: CPT | Performed by: PHYSICIAN ASSISTANT

## 2022-04-13 RX ORDER — METOPROLOL SUCCINATE 100 MG/1
100 TABLET, EXTENDED RELEASE ORAL DAILY
Qty: 90 TABLET | Refills: 1 | Status: SHIPPED | OUTPATIENT
Start: 2022-04-13 | End: 2022-04-28

## 2022-04-13 RX ORDER — LEVALBUTEROL TARTRATE 45 UG/1
2 AEROSOL, METERED ORAL EVERY 4 HOURS PRN
Qty: 45 G | Refills: 3 | Status: SHIPPED | OUTPATIENT
Start: 2022-04-13 | End: 2022-09-14

## 2022-04-13 RX ORDER — AMLODIPINE BESYLATE 10 MG/1
10 TABLET ORAL DAILY
Qty: 90 TABLET | Refills: 2 | Status: SHIPPED | OUTPATIENT
Start: 2022-04-13 | End: 2023-03-06

## 2022-04-13 RX ORDER — FLUTICASONE PROPIONATE 50 MCG
SPRAY, SUSPENSION (ML) NASAL
Qty: 16 G | Refills: 3 | Status: CANCELLED | OUTPATIENT
Start: 2022-04-13

## 2022-04-13 RX ORDER — DULAGLUTIDE 1.5 MG/.5ML
1.5 INJECTION, SOLUTION SUBCUTANEOUS
Qty: 2 ML | Refills: 3 | Status: SHIPPED | OUTPATIENT
Start: 2022-04-13 | End: 2022-05-25 | Stop reason: ALTCHOICE

## 2022-04-13 RX ORDER — METOPROLOL SUCCINATE 50 MG/1
50 TABLET, EXTENDED RELEASE ORAL DAILY
Qty: 90 TABLET | Refills: 1 | Status: SHIPPED | OUTPATIENT
Start: 2022-04-13 | End: 2022-04-28

## 2022-04-13 RX ORDER — LISINOPRIL 10 MG/1
10 TABLET ORAL 2 TIMES DAILY
Qty: 180 TABLET | Refills: 3 | Status: SHIPPED | OUTPATIENT
Start: 2022-04-13 | End: 2023-03-17

## 2022-04-13 ASSESSMENT — ANXIETY QUESTIONNAIRES
3. WORRYING TOO MUCH ABOUT DIFFERENT THINGS: NOT AT ALL
5. BEING SO RESTLESS THAT IT IS HARD TO SIT STILL: NOT AT ALL
GAD7 TOTAL SCORE: 0
2. NOT BEING ABLE TO STOP OR CONTROL WORRYING: NOT AT ALL
1. FEELING NERVOUS, ANXIOUS, OR ON EDGE: NOT AT ALL
7. FEELING AFRAID AS IF SOMETHING AWFUL MIGHT HAPPEN: NOT AT ALL
4. TROUBLE RELAXING: NOT AT ALL
6. BECOMING EASILY ANNOYED OR IRRITABLE: NOT AT ALL

## 2022-04-13 ASSESSMENT — ASTHMA QUESTIONNAIRES
QUESTION_2 LAST FOUR WEEKS HOW OFTEN HAVE YOU HAD SHORTNESS OF BREATH: ONCE OR TWICE A WEEK
QUESTION_5 LAST FOUR WEEKS HOW WOULD YOU RATE YOUR ASTHMA CONTROL: WELL CONTROLLED
ACUTE_EXACERBATION_TODAY: NO
ACT_TOTALSCORE: 22
QUESTION_4 LAST FOUR WEEKS HOW OFTEN HAVE YOU USED YOUR RESCUE INHALER OR NEBULIZER MEDICATION (SUCH AS ALBUTEROL): NOT AT ALL
ACT_TOTALSCORE: 22
QUESTION_1 LAST FOUR WEEKS HOW MUCH OF THE TIME DID YOUR ASTHMA KEEP YOU FROM GETTING AS MUCH DONE AT WORK, SCHOOL OR AT HOME: NONE OF THE TIME
QUESTION_3 LAST FOUR WEEKS HOW OFTEN DID YOUR ASTHMA SYMPTOMS (WHEEZING, COUGHING, SHORTNESS OF BREATH, CHEST TIGHTNESS OR PAIN) WAKE YOU UP AT NIGHT OR EARLIER THAN USUAL IN THE MORNING: ONCE OR TWICE

## 2022-04-13 ASSESSMENT — PATIENT HEALTH QUESTIONNAIRE - PHQ9: SUM OF ALL RESPONSES TO PHQ QUESTIONS 1-9: 0

## 2022-04-13 ASSESSMENT — PAIN SCALES - GENERAL: PAINLEVEL: NO PAIN (0)

## 2022-04-13 NOTE — PROGRESS NOTES
Assessment & Plan     Type 2 diabetes mellitus without complication, without long-term current use of insulin (H)  She will see diabetic Ed.   - Albumin Random Urine Quantitative with Creat Ratio; Future  - Hemoglobin A1c; Future  - blood glucose (NO BRAND SPECIFIED) test strip; Use to test blood sugar 3 times daily or as directed.  - Diabetes Education Referral ([place])    Asthma, mild intermittent  Her breathing is is needing occasion resuce.  Not covering albuterol on her insurance. Reviewed her good RX possibility of Albuterol.   - levalbuterol (XOPENEX HFA) 45 MCG/ACT inhaler; Inhale 2 puffs into the lungs every 4 hours as needed for shortness of breath / dyspnea or wheezing    Benign essential hypertension  Too high still we will refill and get labs.   - metoprolol succinate ER (TOPROL-XL) 50 MG 24 hr tablet; Take 1 tablet (50 mg) by mouth daily  - metoprolol succinate ER (TOPROL-XL) 100 MG 24 hr tablet; Take 1 tablet (100 mg) by mouth daily  - amLODIPine (NORVASC) 10 MG tablet; Take 1 tablet (10 mg) by mouth daily  - lisinopril (ZESTRIL) 10 MG tablet; Take 1 tablet (10 mg) by mouth 2 times daily    HTN (hypertension)  As above.   - metoprolol succinate ER (TOPROL-XL) 50 MG 24 hr tablet; Take 1 tablet (50 mg) by mouth daily  - metoprolol succinate ER (TOPROL-XL) 100 MG 24 hr tablet; Take 1 tablet (100 mg) by mouth daily  - amLODIPine (NORVASC) 10 MG tablet; Take 1 tablet (10 mg) by mouth daily  - lisinopril (ZESTRIL) 10 MG tablet; Take 1 tablet (10 mg) by mouth 2 times daily    Type 2 diabetes mellitus with hyperglycemia, without long-term current use of insulin (H)  Given refill discussion on CGM   - dulaglutide (TRULICITY) 1.5 MG/0.5ML pen; Inject 1.5 mg Subcutaneous every 7 days    Elevated liver enzymes  Get us of her abdomen. See us back as recommended.   - Comprehensive metabolic panel (BMP + Alb, Alk Phos, ALT, AST, Total. Bili, TP); Future  - US Abdomen Limited; Future  - TSH with free T4 reflex;  "Future    Abnormal platelets (H)  See her smear.  Her platelets were clumped LFT abnormal.   - CBC with platelets and differential; Future  - Lab Blood Morphology Pathologist Review; Future    Review of external notes as documented elsewhere in note  Ordering of each unique test  Prescription drug management  10 minutes spent on the date of the encounter doing chart review, review of test results, interpretation of tests, patient visit and documentation        BMI:   Estimated body mass index is 35.78 kg/m  as calculated from the following:    Height as of this encounter: 1.6 m (5' 3\").    Weight as of this encounter: 91.6 kg (202 lb).   Weight management plan: Discussed healthy diet and exercise guidelines    Regular exercise  See Patient Instructions    No follow-ups on file.    LALY Ortega  [facility] - [place] is a 51 year old who presents for the following health issues     HPI     Diabetes Follow-up    How often are you checking your blood sugar? Two times daily  Blood sugar testing frequency justification:  Adjustment of medication(s)  What time of day are you checking your blood sugars (select all that apply)?  Before and after meals  Have you had any blood sugars above 200?  No  Have you had any blood sugars below 70?  No    What symptoms do you notice when your blood sugar is low?  None    What concerns do you have today about your diabetes? None     Do you have any of these symptoms? (Select all that apply)  No numbness or tingling in feet.  No redness, sores or blisters on feet.  No complaints of excessive thirst.  No reports of blurry vision.  No significant changes to weight.    Have you had a diabetic eye exam in the last 12 months? Yes- Date of last eye exam: 2/2022,  Location: [facility]    1. foot deformity   No  2. Current or previous foot ulceration     No  3. Current or previous pre-ulcerative calluses     No  4. previous partial amputation of " one or both feet or complete amputation of one foot     No  5. peripheral neuropathy with evidence of callus formation     No  6. poor circulation     No          Hyperlipidemia Follow-Up      Are you regularly taking any medication or supplement to lower your cholesterol?   No    Are you having muscle aches or other side effects that you think could be caused by your cholesterol lowering medication?  No    Hypertension Follow-up      Do you check your blood pressure regularly outside of the clinic? Yes     Are you following a low salt diet? No    Are your blood pressures ever more than 140 on the top number (systolic) OR more   than 90 on the bottom number (diastolic), for example 140/90? Yes    BP Readings from Last 2 Encounters:   04/13/22 (!) 148/96   03/24/22 169/96     Hemoglobin A1C POCT (%)   Date Value   08/27/2020 7.1 (A)   08/07/2019 6.5 (A)     Hemoglobin A1C (%)   Date Value   09/23/2021 6.3 (H)     LDL Cholesterol Calculated (mg/dL)   Date Value   09/23/2021 69   04/08/2019 78   08/15/2017 63       Asthma Follow-Up    Was ACT completed today?    Yes    ACT Total Scores 4/13/2022   ACT TOTAL SCORE (Goal Greater than or Equal to 20) 22   In the past 12 months, how many times did you visit the emergency room for your asthma without being admitted to the hospital? 0   In the past 12 months, how many times were you hospitalized overnight because of your asthma? 0          How many days per week do you miss taking your asthma controller medication?  0    Please describe any recent triggers for your asthma: upper respiratory infections, mold, exercise or sports and cold air    Have you had any Emergency Room Visits, Urgent Care Visits, or Hospital Admissions since your last office visit?  No      How many servings of fruits and vegetables do you eat daily?  2-3    On average, how many sweetened beverages do you drink each day (Examples: soda, juice, sweet tea, etc.  Do NOT count diet or artificially sweetened  "beverages)?   2    How many days per week do you exercise enough to make your heart beat faster? 3 or less    How many minutes a day do you exercise enough to make your heart beat faster? 20 - 29    How many days per week do you miss taking your medication? 0    Asthma Follow-Up    Was ACT completed today?    Yes    ACT Total Scores 4/13/2022   ACT TOTAL SCORE (Goal Greater than or Equal to 20) 22   In the past 12 months, how many times did you visit the emergency room for your asthma without being admitted to the hospital? 0   In the past 12 months, how many times were you hospitalized overnight because of your asthma? 0          How many days per week do you miss taking your asthma controller medication?  I do not have an asthma controller medication    Please describe any recent triggers for your asthma: None    Have you had any Emergency Room Visits, Urgent Care Visits, or Hospital Admissions since your last office visit?  No            Review of Systems   CONSTITUTIONAL:NEGATIVE for fever, chills, change in weight  INTEGUMENTARY/SKIN: NEGATIVE for worrisome rashes, moles or lesions  EYES: eye exam Feb 22,   ENT/MOUTH: needing dental check in May.    RESP:NEGATIVE for significant cough or SOB  CV: NEGATIVE for chest pain, palpitations or peripheral edema  GI: NEGATIVE for nausea, abdominal pain, heartburn, or change in bowel habits  MUSCULOSKELETAL: NEGATIVE for significant arthralgias or myalgia  NEURO: NEGATIVE for weakness, dizziness or paresthesias  ENDOCRINE: NEGATIVE for temperature intolerance, skin/hair changes  PSYCHIATRIC: NEGATIVE for changes in mood or affect      Objective    BP (!) 148/96 (BP Location: Left arm, Patient Position: Sitting, Cuff Size: Adult Large)   Pulse 78   Temp 98.8  F (37.1  C) (Tympanic)   Resp 16   Ht 1.6 m (5' 3\")   Wt 91.6 kg (202 lb)   LMP 03/07/2015 (Exact Date)   SpO2 98%   BMI 35.78 kg/m    Body mass index is 35.78 kg/m .  Physical Exam   GENERAL: healthy, alert " and no distress  EYES: Eyes grossly normal to inspection, PERRL and conjunctivae and sclerae normal  HENT: ear canals and TM's normal, nose and mouth without ulcers or lesions  NECK: no adenopathy, no asymmetry, masses, or scars and thyroid normal to palpation  RESP: lungs clear to auscultation - no rales, rhonchi or wheezes  CV: regular rate and rhythm, normal S1 S2, no S3 or S4, no murmur, click or rub, no peripheral edema and peripheral pulses strong  ABDOMEN: soft, nontender, no hepatosplenomegaly, no masses and bowel sounds normal  MS: no gross musculoskeletal defects noted, no edema  SKIN: no suspicious lesions or rashes  NEURO: Normal strength and tone, mentation intact and speech normal  PSYCH: mentation appears normal, affect normal/bright    Lab on 09/23/2021   Component Date Value Ref Range Status     Hold Specimen 09/23/2021 [facility]   Final     Hold Specimen 09/23/2021 [facility]   Final     Estimated Average Glucose 09/23/2021 134  mg/dL Final     Hemoglobin A1C 09/23/2021 6.3 (A) 0.0 - 5.6 % Final    Normal <5.7%   Prediabetes 5.7-6.4%    Diabetes 6.5% or higher     Note: Adopted from ADA consensus guidelines.     TSH 09/23/2021 2.29  0.40 - 4.00 mU/L Final     Cholesterol 09/23/2021 178  <200 mg/dL Final     Triglycerides 09/23/2021 279 (A) <150 mg/dL Final     Direct Measure HDL 09/23/2021 53  >=50 mg/dL Final     LDL Cholesterol Calculated 09/23/2021 69  <=100 mg/dL Final     Non HDL Cholesterol 09/23/2021 125  <130 mg/dL Final     Patient Fasting > 8hrs? 09/23/2021 Yes   Final     Sodium 09/23/2021 134  133 - 144 mmol/L Final     Potassium 09/23/2021 4.7  3.4 - 5.3 mmol/L Final    Specimen slightly hemolyzed, potassium may be falsely elevated.     Chloride 09/23/2021 104  94 - 109 mmol/L Final     Carbon Dioxide (CO2) 09/23/2021 21  20 - 32 mmol/L Final     Anion Gap 09/23/2021 9  3 - 14 mmol/L Final     Urea Nitrogen 09/23/2021 11  7 - 30 mg/dL Final     Creatinine 09/23/2021 0.66  0.52 - 1.04 mg/dL Final      Calcium 09/23/2021 8.6  8.5 - 10.1 mg/dL Final     Glucose 09/23/2021 129 (A) 70 - 99 mg/dL Final     Alkaline Phosphatase 09/23/2021 75  40 - 150 U/L Final     AST 09/23/2021 255 (A) 0 - 45 U/L Final     ALT 09/23/2021 170 (A) 0 - 50 U/L Final     Protein Total 09/23/2021 7.5  6.8 - 8.8 g/dL Final     Albumin 09/23/2021 3.8  3.4 - 5.0 g/dL Final     Bilirubin Total 09/23/2021 2.0 (A) 0.2 - 1.3 mg/dL Final     GFR Estimate 09/23/2021 >90  >60 mL/min/1.73m2 Final    As of July 11, 2021, eGFR is calculated by the CKD-EPI creatinine equation, without race adjustment. eGFR can be influenced by muscle mass, exercise, and diet. The reported eGFR is an estimation only and is only applicable if the renal function is stable.     WBC Count 09/23/2021 8.7  4.0 - 11.0 10e3/uL Final     RBC Count 09/23/2021 4.55  3.80 - 5.20 10e6/uL Final     Hemoglobin 09/23/2021 14.5  11.7 - 15.7 g/dL Final     Hematocrit 09/23/2021 41.9  35.0 - 47.0 % Final     MCV 09/23/2021 92  78 - 100 fL Final     MCH 09/23/2021 31.9  26.5 - 33.0 pg Final     MCHC 09/23/2021 34.6  31.5 - 36.5 g/dL Final     RDW 09/23/2021 11.9  10.0 - 15.0 % Final     Platelet Count 09/23/2021 110 (A) 150 - 450 10e3/uL Final     % Neutrophils 09/23/2021 61  % Final     % Lymphocytes 09/23/2021 28  % Final     % Monocytes 09/23/2021 8  % Final     % Eosinophils 09/23/2021 2  % Final     % Basophils 09/23/2021 1  % Final     % Immature Granulocytes 09/23/2021 0  % Final     NRBCs per 100 WBC 09/23/2021 0  <1 /100 Final     Absolute Neutrophils 09/23/2021 5.4  1.6 - 8.3 10e3/uL Final     Absolute Lymphocytes 09/23/2021 2.4  0.8 - 5.3 10e3/uL Final     Absolute Monocytes 09/23/2021 0.7  0.0 - 1.3 10e3/uL Final     Absolute Eosinophils 09/23/2021 0.2  0.0 - 0.7 10e3/uL Final     Absolute Basophils 09/23/2021 0.0  0.0 - 0.2 10e3/uL Final     Absolute Immature Granulocytes 09/23/2021 0.0  <=0.0 10e3/uL Final     Absolute NRBCs 09/23/2021 0.0  10e3/uL Final     Platelet  Assessment 09/23/2021 Platelets Clumped (A) Automated Count Confirmed. Platelet morphology is normal. Final     RBC Morphology 09/23/2021 Confirmed RBC Indices   Final

## 2022-04-13 NOTE — NURSING NOTE
"Chief Complaint   Patient presents with     Diabetes     Hypertension     Lipids     Asthma       Initial BP (!) 148/96 (BP Location: Left arm, Patient Position: Sitting, Cuff Size: Adult Large)   Pulse 78   Temp 98.8  F (37.1  C) (Tympanic)   Resp 16   Ht 1.6 m (5' 3\")   Wt 91.6 kg (202 lb)   LMP 03/07/2015 (Exact Date)   SpO2 98%   BMI 35.78 kg/m   Estimated body mass index is 35.78 kg/m  as calculated from the following:    Height as of this encounter: 1.6 m (5' 3\").    Weight as of this encounter: 91.6 kg (202 lb).  Medication Reconciliation: complete  Laya Parsons LPN  "

## 2022-04-13 NOTE — LETTER
My Asthma Action Plan    Name: Edith Doty   YOB: 1970  Date: 4/13/2022   My doctor: LALY Ortega   [facility]        My Rescue Medicine:   Albuterol inhaler (Proair/Ventolin/Proventil HFA)  2-4 puffs EVERY 4 HOURS as needed. Use a spacer if recommended by your provider.   My Asthma Severity:   Mild Persistent  Know your asthma triggers: mold and exercise or sports  upper respiratory infections  mold  exercise or sports  cold air          GREEN ZONE   Good Control    I feel good    No cough or wheeze    Can work, sleep and play without asthma symptoms       Take your asthma control medicine every day.     1. If exercise triggers your asthma, take your rescue medication    15 minutes before exercise or sports, and    During exercise if you have asthma symptoms  2. Spacer to use with inhaler: If you have a spacer, make sure to use it with your inhaler             YELLOW ZONE Getting Worse  I have ANY of these:    I do not feel good    Cough or wheeze    Chest feels tight    Wake up at night   1. Keep taking your Green Zone medications  2. Start taking your rescue medicine:    every 20 minutes for up to 1 hour. Then every 4 hours for 24-48 hours.  3. If you stay in the Yellow Zone for more than 12-24 hours, contact your doctor.  4. If you do not return to the Green Zone in 12-24 hours or you get worse, start taking your oral steroid medicine if prescribed by your provider.           RED ZONE Medical Alert - Get Help  I have ANY of these:    I feel awful    Medicine is not helping    Breathing getting harder    Trouble walking or talking    Nose opens wide to breathe       1. Take your rescue medicine NOW  2. If your provider has prescribed an oral steroid medicine, start taking it NOW  3. Call your doctor NOW  4. If you are still in the Red Zone after 20 minutes and you have not reached your doctor:    Take your rescue medicine again and    Call 911 or go  to the emergency room right away    See your regular doctor within 2 weeks of an Emergency Room or Urgent Care visit for follow-up treatment.          Annual Reminders:  Meet with Asthma Educator,  Flu Shot in the Fall, consider Pneumonia Vaccination for patients with asthma (aged 19 and older).    Pharmacy:    [id number] - [place], [place] - [address]  [id number] - [place], [place] - [address]     Electronically signed by LALY Ortega   Date: 04/13/22                    Asthma Triggers  How To Control Things That Make Your Asthma Worse    Triggers are things that make your asthma worse.  Look at the list below to help you find your triggers and   what you can do about them. You can help prevent asthma flare-ups by staying away from your triggers.      Trigger                                                          What you can do   Cigarette Smoke  Tobacco smoke can make asthma worse. Do not allow smoking in your home, car or around you.  Be sure no one smokes at a child s day care or school.  If you smoke, ask your health care provider for ways to help you quit.  Ask family members to quit too.  Ask your health care provider for a referral to Quit Plan to help you quit smoking, or call 5-567-434-PLAN.     Colds, Flu, Bronchitis  These are common triggers of asthma. Wash your hands often.  Don t touch your eyes, nose or mouth.  Get a flu shot every year.     Dust Mites  These are tiny bugs that live in cloth or carpet. They are too small to see. Wash sheets and blankets in hot water every week.   Encase pillows and mattress in dust mite proof covers.  Avoid having carpet if you can. If you have carpet, vacuum weekly.   Use a dust mask and HEPA vacuum.   Pollen and Outdoor Mold  Some people are allergic to trees, grass, or weed pollen, or molds. Try to keep your windows closed.  Limit time out doors when pollen count is high.   Ask you health care  provider about taking medicine during allergy season.     Animal Dander  Some people are allergic to skin flakes, urine or saliva from pets with fur or feathers. Keep pets with fur or feathers out of your home.    If you can t keep the pet outdoors, then keep the pet out of your bedroom.  Keep the bedroom door closed.  Keep pets off cloth furniture and away from stuffed toys.     Mice, Rats, and Cockroaches  Some people are allergic to the waste from these pests.   Cover food and garbage.  Clean up spills and food crumbs.  Store grease in the refrigerator.   Keep food out of the bedroom.   Indoor Mold  This can be a trigger if your home has high moisture. Fix leaking faucets, pipes, or other sources of water.   Clean moldy surfaces.  Dehumidify basement if it is damp and smelly.   Smoke, Strong Odors, and Sprays  These can reduce air quality. Stay away from strong odors and sprays, such as perfume, powder, hair spray, paints, smoke incense, paint, cleaning products, candles and new carpet.   Exercise or Sports  Some people with asthma have this trigger. Be active!  Ask your doctor about taking medicine before sports or exercise to prevent symptoms.    Warm up for 5-10 minutes before and after sports or exercise.     Other Triggers of Asthma  Cold air:  Cover your nose and mouth with a scarf.  Sometimes laughing or crying can be a trigger.  Some medicines and food can trigger asthma.

## 2022-04-14 ASSESSMENT — ANXIETY QUESTIONNAIRES: GAD7 TOTAL SCORE: 0

## 2022-04-19 LAB
PATH REPORT.COMMENTS IMP SPEC: NORMAL
PATH REPORT.COMMENTS IMP SPEC: NORMAL
PATH REPORT.FINAL DX SPEC: NORMAL
PATH REPORT.MICROSCOPIC SPEC OTHER STN: NORMAL
PATH REPORT.MICROSCOPIC SPEC OTHER STN: NORMAL

## 2022-04-27 NOTE — PROGRESS NOTES
Assessment & Plan     HTN (hypertension)  Adding in water pill to her already packed blood pressure pills.  She still runs on the border.  Never runs low.  Swelling in legs, could be from her amlodipine.   - metoprolol succinate ER (TOPROL-XL) 50 MG 24 hr tablet; Take 1 tablet (50 mg) by mouth daily In afternoon  - metoprolol succinate ER (TOPROL-XL) 100 MG 24 hr tablet; Take 1 tablet (100 mg) by mouth daily In morning  - chlorthalidone (HYGROTON) 25 MG tablet; Take 1 tablet (25 mg) by mouth daily    Type 2 diabetes mellitus without complication, without long-term current use of insulin (H)  Needing labs for Van.  Hopefully this will tell us where she is standing and her blood glucose.  Today her blood pressure is 130/80 would encourage her to keep it as low as she can.  As far as her weight she is going to also work on this to try to make her not have to work so hard in her diabetic control.    Benign essential hypertension  As above  - metoprolol succinate ER (TOPROL-XL) 50 MG 24 hr tablet; Take 1 tablet (50 mg) by mouth daily In afternoon  - metoprolol succinate ER (TOPROL-XL) 100 MG 24 hr tablet; Take 1 tablet (100 mg) by mouth daily In morning  - chlorthalidone (HYGROTON) 25 MG tablet; Take 1 tablet (25 mg) by mouth daily    Review of external notes as documented elsewhere in note  Ordering of each unique test  Prescription drug management  10 minutes spent on the date of the encounter doing chart review, history and exam, documentation and further activities per the note       See Patient Instructions    No follow-ups on file.    LALY Ortega  [facility] - ISABEL Abdi is a 51 year old who presents for the following health issues     HPI     Lab Follow up        Recent Labs   Lab Test 04/13/22  1648 09/23/21  0730 08/27/20  0000 08/07/19  0000 06/03/19  0905 04/08/19  0924 06/25/18  0942 06/10/18  1442 08/28/17  1030 08/15/17  0840   A1C 6.6* 6.3* 7.1*   < >  --  7.1*  6.5*  --    < >  --    LDL  --  69  --   --   --  78  --   --   --  63   HDL  --  53  --   --   --  73  --   --   --  52   TRIG  --  279*  --   --   --  235*  --   --   --  294*   ALT 75* 170*  --   --   --   --   --  72*  --  39   CR 0.68 0.66  --   --   --   --  0.65 0.61  --  0.68   GFRESTIMATED >90 >90  --   --  >90  --  >90 >90  --  >90   GFRESTBLACK  --   --   --   --  >90  --  >90 >90  --  >90   POTASSIUM 3.5 4.7  --   --   --   --  4.0 3.5  --  4.1   TSH 1.90 2.29  --   --   --   --  0.14*  --   --   --     < > = values in this interval not displayed.      BP Readings from Last 3 Encounters:   04/28/22 (!) 140/90   04/13/22 (!) 148/96   03/24/22 169/96    Wt Readings from Last 3 Encounters:   04/28/22 90.3 kg (199 lb)   04/13/22 91.6 kg (202 lb)   09/24/21 85.3 kg (188 lb)           Diabetes Follow-up      How often are you checking your blood sugar? daily    What concerns do you have today about your diabetes? None     Do you have any of these symptoms? (Select all that apply)  No numbness or tingling in feet.  No redness, sores or blisters on feet.  No complaints of excessive thirst.  No reports of blurry vision.  No significant changes to weight.              Hyperlipidemia Follow-Up      Are you regularly taking any medication or supplement to lower your cholesterol?   No    Are you having muscle aches or other side effects that you think could be caused by your cholesterol lowering medication?  No    Hypertension Follow-up      Do you check your blood pressure regularly outside of the clinic? Yes     Are you following a low salt diet? Yes    Are your blood pressures ever more than 140 on the top number (systolic) OR more   than 90 on the bottom number (diastolic), for example 140/90? Yes    BP Readings from Last 2 Encounters:   05/10/22 130/84   04/28/22 (!) 140/90     Hemoglobin A1C POCT (%)   Date Value   08/27/2020 7.1 (A)   08/07/2019 6.5 (A)     Hemoglobin A1C (%)   Date Value   04/13/2022 6.6 (H)    09/23/2021 6.3 (H)     LDL Cholesterol Calculated (mg/dL)   Date Value   09/23/2021 69   04/08/2019 78   08/15/2017 63         How many servings of fruits and vegetables do you eat daily?  2-3    On average, how many sweetened beverages do you drink each day (Examples: soda, juice, sweet tea, etc.  Do NOT count diet or artificially sweetened beverages)?   0    How many days per week do you exercise enough to make your heart beat faster? 3 or less    How many minutes a day do you exercise enough to make your heart beat faster? 9 or less    How many days per week do you miss taking your medication? 0      Review of Systems   Constitutional, HEENT, cardiovascular, pulmonary, gi and gu systems are negative, except as otherwise noted.      Objective    BP (!) 163/88 (BP Location: Left arm, Patient Position: Sitting, Cuff Size: Adult Large)   Pulse 54   Resp 18   Wt 90.3 kg (199 lb)   LMP 03/07/2015 (Exact Date)   SpO2 98%   BMI 35.25 kg/m    Body mass index is 35.25 kg/m .  Physical Exam   GENERAL: healthy, alert and no distress  NECK: no adenopathy, no asymmetry, masses, or scars and thyroid normal to palpation  RESP: lungs clear to auscultation - no rales, rhonchi or wheezes  CV: regular rate and rhythm, normal S1 S2, no S3 or S4, no murmur, click or rub, no peripheral edema and peripheral pulses strong  CV: ankles are swollen   ABDOMEN: soft, nontender, no hepatosplenomegaly, no masses and bowel sounds normal  MS: no gross musculoskeletal defects noted, no edema    Office Visit on 04/13/2022   Component Date Value Ref Range Status     Creatinine Urine mg/dL 04/13/2022 202  mg/dL Final     Albumin Urine mg/L 04/13/2022 148  mg/L Final     Albumin Urine mg/g Cr 04/13/2022 73.27 (A) 0.00 - 25.00 mg/g Cr Final     Estimated Average Glucose 04/13/2022 143  mg/dL Final     Hemoglobin A1C 04/13/2022 6.6 (A) 0.0 - 5.6 % Final    Normal <5.7%   Prediabetes 5.7-6.4%    Diabetes 6.5% or higher     Note: Adopted from ADA  consensus guidelines.     Sodium 04/13/2022 136  133 - 144 mmol/L Final     Potassium 04/13/2022 3.5  3.4 - 5.3 mmol/L Final     Chloride 04/13/2022 104  94 - 109 mmol/L Final     Carbon Dioxide (CO2) 04/13/2022 27  20 - 32 mmol/L Final     Anion Gap 04/13/2022 5  3 - 14 mmol/L Final     Urea Nitrogen 04/13/2022 13  7 - 30 mg/dL Final     Creatinine 04/13/2022 0.68  0.52 - 1.04 mg/dL Final     Calcium 04/13/2022 8.7  8.5 - 10.1 mg/dL Final     Glucose 04/13/2022 122 (A) 70 - 99 mg/dL Final     Alkaline Phosphatase 04/13/2022 70  40 - 150 U/L Final     AST 04/13/2022 45  0 - 45 U/L Final     ALT 04/13/2022 75 (A) 0 - 50 U/L Final     Protein Total 04/13/2022 7.5  6.8 - 8.8 g/dL Final     Albumin 04/13/2022 3.6  3.4 - 5.0 g/dL Final     Bilirubin Total 04/13/2022 1.5 (A) 0.2 - 1.3 mg/dL Final     GFR Estimate 04/13/2022 >90  >60 mL/min/1.73m2 Final    Effective December 21, 2021 eGFRcr in adults is calculated using the 2021 CKD-EPI creatinine equation which includes age and gender (Nathen roach al., [facility], DOI: 10.1056/KSMLbt5335334)     TSH 04/13/2022 1.90  0.40 - 4.00 mU/L Final     WBC Count 04/13/2022 8.9  4.0 - 11.0 10e3/uL Final     RBC Count 04/13/2022 4.62  3.80 - 5.20 10e6/uL Final     Hemoglobin 04/13/2022 14.6  11.7 - 15.7 g/dL Final     Hematocrit 04/13/2022 42.5  35.0 - 47.0 % Final     MCV 04/13/2022 92  78 - 100 fL Final     MCH 04/13/2022 31.6  26.5 - 33.0 pg Final     MCHC 04/13/2022 34.4  31.5 - 36.5 g/dL Final     RDW 04/13/2022 12.2  10.0 - 15.0 % Final     Platelet Count 04/13/2022 308  150 - 450 10e3/uL Final     % Neutrophils 04/13/2022 51  % Final     % Lymphocytes 04/13/2022 36  % Final     % Monocytes 04/13/2022 9  % Final     % Eosinophils 04/13/2022 3  % Final     % Basophils 04/13/2022 1  % Final     % Immature Granulocytes 04/13/2022 0  % Final     NRBCs per 100 WBC 04/13/2022 0  <1 /100 Final     Absolute Neutrophils 04/13/2022 4.6  1.6 - 8.3 10e3/uL Final     Absolute Lymphocytes 04/13/2022  3.2  0.8 - 5.3 10e3/uL Final     Absolute Monocytes 04/13/2022 0.8  0.0 - 1.3 10e3/uL Final     Absolute Eosinophils 04/13/2022 0.2  0.0 - 0.7 10e3/uL Final     Absolute Basophils 04/13/2022 0.1  0.0 - 0.2 10e3/uL Final     Absolute Immature Granulocytes 04/13/2022 0.0  <=0.4 10e3/uL Final     Absolute NRBCs 04/13/2022 0.0  10e3/uL Final     Final Diagnosis 04/13/2022    Final                    Value:This result contains rich text formatting which cannot be displayed here.     Comment 04/13/2022    Final                    Value:This result contains rich text formatting which cannot be displayed here.     Peripheral Smear 04/13/2022    Final                    Value:This result contains rich text formatting which cannot be displayed here.     Peripheral Hematologic Data 04/13/2022    Final                    Value:This result contains rich text formatting which cannot be displayed here.     Performing Labs 04/13/2022    Final                    Value:This result contains rich text formatting which cannot be displayed here.     % Reticulocyte 04/13/2022 3.3 (A) 0.5 - 2.0 % Final     Absolute Reticulocyte 04/13/2022 0.152 (A) 0.025 - 0.095 10e6/uL Final     No results found for any visits on 04/28/22.

## 2022-04-28 ENCOUNTER — OFFICE VISIT (OUTPATIENT)
Dept: FAMILY MEDICINE | Facility: OTHER | Age: 52
End: 2022-04-28
Attending: PHYSICIAN ASSISTANT
Payer: COMMERCIAL

## 2022-04-28 VITALS
OXYGEN SATURATION: 98 % | HEART RATE: 54 BPM | SYSTOLIC BLOOD PRESSURE: 140 MMHG | RESPIRATION RATE: 18 BRPM | BODY MASS INDEX: 35.25 KG/M2 | WEIGHT: 199 LBS | DIASTOLIC BLOOD PRESSURE: 90 MMHG

## 2022-04-28 DIAGNOSIS — I10 BENIGN ESSENTIAL HYPERTENSION: ICD-10-CM

## 2022-04-28 DIAGNOSIS — E11.9 TYPE 2 DIABETES MELLITUS WITHOUT COMPLICATION, WITHOUT LONG-TERM CURRENT USE OF INSULIN (H): ICD-10-CM

## 2022-04-28 DIAGNOSIS — I10 BENIGN ESSENTIAL HYPERTENSION: Primary | ICD-10-CM

## 2022-04-28 PROCEDURE — 99214 OFFICE O/P EST MOD 30 MIN: CPT | Performed by: PHYSICIAN ASSISTANT

## 2022-04-28 RX ORDER — CHLORTHALIDONE 25 MG/1
25 TABLET ORAL DAILY
Qty: 30 TABLET | Refills: 3 | Status: SHIPPED | OUTPATIENT
Start: 2022-04-28 | End: 2022-08-24

## 2022-04-28 RX ORDER — METOPROLOL SUCCINATE 50 MG/1
50 TABLET, EXTENDED RELEASE ORAL DAILY
Qty: 90 TABLET | Refills: 1 | COMMUNITY
Start: 2022-04-28 | End: 2022-11-09

## 2022-04-28 RX ORDER — METOPROLOL SUCCINATE 100 MG/1
100 TABLET, EXTENDED RELEASE ORAL DAILY
Qty: 90 TABLET | Refills: 1 | Status: SHIPPED | OUTPATIENT
Start: 2022-04-28 | End: 2022-12-16

## 2022-04-28 ASSESSMENT — PAIN SCALES - GENERAL: PAINLEVEL: NO PAIN (0)

## 2022-04-28 NOTE — NURSING NOTE
"Chief Complaint   Patient presents with     Recheck Medication       Initial BP (!) 163/88 (BP Location: Left arm, Patient Position: Sitting, Cuff Size: Adult Large)   Pulse 54   Resp 18   Wt 90.3 kg (199 lb)   LMP 03/07/2015 (Exact Date)   SpO2 98%   BMI 35.25 kg/m   Estimated body mass index is 35.25 kg/m  as calculated from the following:    Height as of 4/13/22: 1.6 m (5' 3\").    Weight as of this encounter: 90.3 kg (199 lb).  Medication Reconciliation: complete  Catarina Thakur LPN  "

## 2022-05-10 ENCOUNTER — HOSPITAL ENCOUNTER (OUTPATIENT)
Dept: EDUCATION SERVICES | Facility: HOSPITAL | Age: 52
Discharge: HOME | End: 2022-05-10
Attending: PHYSICIAN ASSISTANT | Admitting: PHYSICIAN ASSISTANT
Payer: COMMERCIAL

## 2022-05-10 VITALS
OXYGEN SATURATION: 98 % | WEIGHT: 199.1 LBS | RESPIRATION RATE: 16 BRPM | HEIGHT: 63 IN | DIASTOLIC BLOOD PRESSURE: 84 MMHG | SYSTOLIC BLOOD PRESSURE: 130 MMHG | BODY MASS INDEX: 35.28 KG/M2 | HEART RATE: 89 BPM

## 2022-05-10 DIAGNOSIS — E11.9 TYPE 2 DIABETES MELLITUS WITHOUT COMPLICATION, WITHOUT LONG-TERM CURRENT USE OF INSULIN (H): Primary | ICD-10-CM

## 2022-05-10 PROCEDURE — G0108 DIAB MANAGE TRN  PER INDIV: HCPCS

## 2022-05-10 RX ORDER — DULAGLUTIDE 3 MG/.5ML
3 INJECTION, SOLUTION SUBCUTANEOUS WEEKLY
Qty: 2 ML | Refills: 3 | Status: SHIPPED | OUTPATIENT
Start: 2022-05-10 | End: 2022-08-22

## 2022-05-10 RX ORDER — LANCETS
EACH MISCELLANEOUS
Qty: 100 EACH | Refills: 11 | Status: SHIPPED | OUTPATIENT
Start: 2022-05-10 | End: 2023-05-24

## 2022-05-10 ASSESSMENT — PAIN SCALES - GENERAL: PAINLEVEL: NO PAIN (0)

## 2022-05-10 NOTE — PATIENT INSTRUCTIONS
Test BG AM and 2 hours after evening meal    Please bring your meter to your appointment with LALY Paez, for download.    Increase Trulicity to 3 mg weekly    Questions/concerns call [phone]

## 2022-05-11 NOTE — PROGRESS NOTES
"Diabetes Self-Management Education & Support    Presents for: Follow-up    SUBJECTIVE/OBJECTIVE:  Presents for: Follow-up  Accompanied by: Self  Diabetes education in the past 24mo: Yes  Focus of Visit: Taking Medication  Diabetes type: Type 2  Disease course: Improving  Diabetes management related comments/concerns: Needs new meter, FBG above target  Transportation concerns: No  Difficulty affording diabetes medication?: Sometimes  Difficulty affording diabetes testing supplies?: No  Other concerns:: None  Cultural Influences/Ethnic Background:  Choose not to answer      Diabetes Symptoms & Complications:  Fatigue: No  Neuropathy: No  Polydipsia: No  Polyphagia: No  Polyuria: No  Visual change: No  Slow healing wounds: No  Symptom course: Stable  Weight trend: Stable  Complications assessed today?: Yes  Autonomic neuropathy: No  CVA: No  Heart disease: No  Nephropathy: No  Peripheral neuropathy: No  Foot ulcerations: No  Peripheral Vascular Disease: No  Retinopathy: No  Sexual dysfunction: No    Patient Problem List and Family Medical History reviewed for relevant medical history, current medical status, and diabetes risk factors.    Vitals:  /84   Pulse 89   Resp 16   Ht 1.6 m (5' 3\")   Wt 90.3 kg (199 lb 1.6 oz)   LMP 03/07/2015 (Exact Date)   SpO2 98%   BMI 35.27 kg/m    Estimated body mass index is 35.27 kg/m  as calculated from the following:    Height as of this encounter: 1.6 m (5' 3\").    Weight as of this encounter: 90.3 kg (199 lb 1.6 oz).   Last 3 BP:   BP Readings from Last 3 Encounters:   05/10/22 130/84   04/28/22 (!) 140/90   04/13/22 (!) 148/96       History   Smoking Status     Never Smoker   Smokeless Tobacco     Never Used     Comment: no passive exposure       Labs:  Lab Results   Component Value Date    A1C 6.6 04/13/2022    A1C 7.1 08/27/2020     Lab Results   Component Value Date     04/13/2022     06/25/2018     Lab Results   Component Value Date    LDL 69 " 09/23/2021    LDL 78 04/08/2019     HDL Cholesterol   Date Value Ref Range Status   04/08/2019 73 >49 mg/dL Final     Direct Measure HDL   Date Value Ref Range Status   09/23/2021 53 >=50 mg/dL Final   ]  GFR Estimate   Date Value Ref Range Status   04/13/2022 >90 >60 mL/min/1.73m2 Final     Comment:     Effective December 21, 2021 eGFRcr in adults is calculated using the 2021 CKD-EPI creatinine equation which includes age and gender (Nathen et al., [facility], DOI: 10.1056/LPZFwq8270678)   06/03/2019 >90 >60 mL/min/[1.73_m2] Final     GFR Estimate If Black   Date Value Ref Range Status   06/03/2019 >90 >60 mL/min/[1.73_m2] Final     Lab Results   Component Value Date    CR 0.68 04/13/2022    CR 0.65 06/25/2018     No results found for: MICROALBUMIN    Healthy Eating:  Healthy Eating Assessed Today: Yes  Cultural/Yazdanism diet restrictions?: No  Meals include: Breakfast, Lunch, Dinner  Breakfast: cereal or eggs/valenzuela  Dinner: chicken or tacos or roasts hamburgers/hot dogs, vegetables  Other: ice cream  Beverages: Water, Diet soda, Milk, Coffee, Juice  Has patient met with a dietitian in the past?: No    Being Active:  Being Active Assessed Today: Yes  Exercise:: Yes (treadmill)  How intense was your typical exercise? : Moderate (like brisk walking)  Barrier to exercise: None    Monitoring:  Monitoring Assessed Today: Yes  Did patient bring glucose meter to appointment? : Yes  Blood Glucose Meter: One Touch  Times checking blood sugar at home (number): 2  Times checking blood sugar at home (per): Day  Blood glucose trend: Increasing      Taking Medications:  Diabetes Medication(s)     Biguanides       metFORMIN (GLUCOPHAGE) 1000 MG tablet    TAKE 1 TABLET BY MOUTH TWICE DAILY WITH MEALS    Incretin Mimetic Agents (GLP-1 Receptor Agonists)       dulaglutide (TRULICITY) 1.5 MG/0.5ML pen    Inject 1.5 mg Subcutaneous every 7 days     Dulaglutide (TRULICITY) 3 MG/0.5ML SOPN    Inject 3 mg Subcutaneous once a week           Taking Medication Assessed Today: Yes  Current Treatments: Oral Medication (taken by mouth), Non-insulin Injectables  Problems taking diabetes medications regularly?: No  Diabetes medication side effects?: No    Problem Solving:  Problem Solving Assessed Today: Yes  Is the patient at risk for hypoglycemia?: No  Hypoglycemia Frequency: Rarely  Hypoglycemia Treatment: Glucose (tablets or gel), Candy  Patient carries a carbohydrate source: No  Medical ID: No  Does patient have DKA prevention plan?: No  Does patient have severe weather/disaster plan for diabetes management?: No    Hypoglycemia symptoms  Confusion: No  Dizziness or Light-Headedness: No  Headaches: No  Hunger: Yes  Mood changes: No  Nervousness/Anxiety: No  Sleepiness: No  Speech difficulty: No  Sweats: No  Feeling shaky: No    Hypoglycemia Complications  Blackouts: No  Hospitalization: No  Nocturnal hypoglycemia: No  Required assistance: No  Required glucagon injection: No  Seizures: No    Reducing Risks:  Reducing Risks Assessed Today: Yes  CAD Risks: Family history, Hypertension, Obesity, Stress  Has dilated eye exam at least once a year?: Yes  Sees dentist every 6 months?: Yes  Feet checked by healthcare provider in the last year?: No (due)    Healthy Coping:  Healthy Coping Assessed Today: Yes  Emotional response to diabetes: Confidence diabetes can be controlled  Informal Support system:: Children, Family, Parent, Spouse  Stage of change: ACTION (Actively working towards change)  Support resources: None  Patient Activation Measure Survey Score:  JAIME Score (Last Two) 12/19/2018   JAIME Raw Score 40   Activation Score 100   JAIME Level 4       Diabetes knowledge and skills assessment:   Patient is knowledgeable in diabetes management concepts related to: Healthy Eating, Monitoring and Taking Medication    Patient needs further education on the following diabetes management concepts: Monitoring, Taking Medication and Reducing Risks    Based on  learning assessment above, most appropriate setting for further diabetes education would be: Individual setting.      INTERVENTIONS:    Education provided today on:  AADE Self-Care Behaviors:  Diabetes Pathophysiology  Monitoring: purpose, log and interpret results, individual blood glucose targets, frequency of monitoring and proper sharps disposal  Taking Medication: action of prescribed medication, drawing up, administering and storing injectable diabetes medications, proper site selection and rotation for injections, side effects of prescribed medications and when to take medications  Reducing Risks: prevention, early diagnostic measures and treatment of complications, annual eye exam, A1C - goals, relating to blood glucose levels, how often to check and blood pressure and goals    Opportunities for ongoing education and support in diabetes-self management were discussed.    Pt verbalized understanding of concepts discussed and recommendations provided today.       Education Materials Provided:  Accu-Chek Guide Me meter kit      ASSESSMENT:  Readings range as follows: fastin-172    [place] needs a new meter as her insurance is no longer covering the previous meter. Gave her a meter kit today. Called her pharmacy to see which test strips were ready for her at the pharmacy.    FBG above target. She is open to testing after evening meal and increasing the Trulicity.        Patient's most recent   Lab Results   Component Value Date    A1C 6.6 2022    A1C 7.1 2020    is meeting goal of <7.0    PLAN  See Patient Instructions for co-developed, patient-stated behavior change goals.  Test BG AM and 2 hours after evening meal    Please bring your meter to your appointment with LALY Paez, for download.    Increase Trulicity to 3 mg weekly    Questions/concerns call [phone]  AVS printed and provided to patient today. See Follow-Up section for recommended follow-up.      Time Spent: 45  minutes  Encounter Type: Individual    Any diabetes medication dose changes were made via the CDE Protocol and Collaborative Practice Agreement with the patient's primary care provider. A copy of this encounter was shared with the provider.

## 2022-05-14 ENCOUNTER — HEALTH MAINTENANCE LETTER (OUTPATIENT)
Age: 52
End: 2022-05-14

## 2022-05-24 ENCOUNTER — TELEPHONE (OUTPATIENT)
Dept: MAMMOGRAPHY | Facility: OTHER | Age: 52
End: 2022-05-24

## 2022-05-24 ENCOUNTER — HOSPITAL ENCOUNTER (OUTPATIENT)
Dept: ULTRASOUND IMAGING | Facility: HOSPITAL | Age: 52
Discharge: HOME | End: 2022-05-24
Attending: PHYSICIAN ASSISTANT
Payer: COMMERCIAL

## 2022-05-24 ENCOUNTER — ANCILLARY PROCEDURE (OUTPATIENT)
Dept: MAMMOGRAPHY | Facility: OTHER | Age: 52
End: 2022-05-24
Attending: PHYSICIAN ASSISTANT
Payer: COMMERCIAL

## 2022-05-24 DIAGNOSIS — R74.8 ELEVATED LIVER ENZYMES: ICD-10-CM

## 2022-05-24 DIAGNOSIS — Z12.31 VISIT FOR SCREENING MAMMOGRAM: ICD-10-CM

## 2022-05-24 PROCEDURE — 77063 BREAST TOMOSYNTHESIS BI: CPT | Mod: TC | Performed by: RADIOLOGY

## 2022-05-24 PROCEDURE — 76705 ECHO EXAM OF ABDOMEN: CPT

## 2022-05-24 PROCEDURE — 77067 SCR MAMMO BI INCL CAD: CPT | Mod: TC | Performed by: RADIOLOGY

## 2022-05-31 DIAGNOSIS — E11.65 TYPE 2 DIABETES MELLITUS WITH HYPERGLYCEMIA, WITHOUT LONG-TERM CURRENT USE OF INSULIN (H): ICD-10-CM

## 2022-06-01 RX ORDER — DULAGLUTIDE 1.5 MG/.5ML
INJECTION, SOLUTION SUBCUTANEOUS
Qty: 2 ML | Refills: 3 | Status: SHIPPED | OUTPATIENT
Start: 2022-06-01 | End: 2022-08-26

## 2022-06-01 NOTE — TELEPHONE ENCOUNTER
Trulicity       Last Written Prescription Date:  5-10-22  Last Fill Quantity: 2,   # refills: 3  Last Office Visit: 4-28-22  Future Office visit:

## 2022-08-18 DIAGNOSIS — E11.9 TYPE 2 DIABETES MELLITUS WITHOUT COMPLICATION, WITHOUT LONG-TERM CURRENT USE OF INSULIN (H): ICD-10-CM

## 2022-08-22 ENCOUNTER — TELEPHONE (OUTPATIENT)
Dept: FAMILY MEDICINE | Facility: OTHER | Age: 52
End: 2022-08-22

## 2022-08-22 RX ORDER — DULAGLUTIDE 3 MG/.5ML
3 INJECTION, SOLUTION SUBCUTANEOUS WEEKLY
Qty: 2 ML | Refills: 3 | Status: SHIPPED | OUTPATIENT
Start: 2022-08-22 | End: 2022-08-26

## 2022-08-22 NOTE — TELEPHONE ENCOUNTER
3:17 PM    Reason for Call: OVERBOOK    Patient needs to reschedule follow up with Laura Mendoza. Patient needs a later afternoon appt and prefers for it to not be next week.    The patient is requesting an appointment for ASAP with Laura Mendoza.    Was an appointment offered for this call? Yes  If yes : Appointment type              Date 08/31/2022- DECLINED    Preferred method for responding to this message: Telephone Call  What is your phone number ? [phone]    If we cannot reach you directly, may we leave a detailed response at the number you provided? Yes    Can this message wait until your PCP/provider returns, if unavailable today? YES, PROVIDER IS OUT TODAY     Gregoria Ruano

## 2022-08-22 NOTE — TELEPHONE ENCOUNTER
Trulicity      Last Written Prescription Date:  6/1/22  Last Fill Quantity: 2 mL,   # refills: 3  Last Office Visit: 5/10/22  Future Office visit:       Routing refill request to provider for review/approval because:

## 2022-08-24 DIAGNOSIS — I10 BENIGN ESSENTIAL HYPERTENSION: ICD-10-CM

## 2022-08-24 RX ORDER — CHLORTHALIDONE 25 MG/1
25 TABLET ORAL DAILY
Qty: 30 TABLET | Refills: 3 | Status: SHIPPED | OUTPATIENT
Start: 2022-08-24 | End: 2022-12-13

## 2022-08-25 ENCOUNTER — TELEPHONE (OUTPATIENT)
Dept: EDUCATION SERVICES | Facility: HOSPITAL | Age: 52
End: 2022-08-25

## 2022-08-25 NOTE — TELEPHONE ENCOUNTER
Patient sent overbook request for Laura. Laura did say patient can be put on her schedule any time Friday afternoon(8/25/22).     Left vm for patient to call back and get this scheduled

## 2022-08-26 ENCOUNTER — HOSPITAL ENCOUNTER (OUTPATIENT)
Dept: EDUCATION SERVICES | Facility: HOSPITAL | Age: 52
Discharge: HOME | End: 2022-08-26
Attending: NURSE PRACTITIONER | Admitting: NURSE PRACTITIONER
Payer: COMMERCIAL

## 2022-08-26 VITALS
SYSTOLIC BLOOD PRESSURE: 125 MMHG | BODY MASS INDEX: 36.42 KG/M2 | WEIGHT: 192.9 LBS | HEIGHT: 61 IN | RESPIRATION RATE: 16 BRPM | DIASTOLIC BLOOD PRESSURE: 85 MMHG | HEART RATE: 94 BPM | OXYGEN SATURATION: 99 %

## 2022-08-26 DIAGNOSIS — E11.65 TYPE 2 DIABETES MELLITUS WITH HYPERGLYCEMIA, WITHOUT LONG-TERM CURRENT USE OF INSULIN (H): ICD-10-CM

## 2022-08-26 DIAGNOSIS — E11.9 TYPE 2 DIABETES MELLITUS WITHOUT COMPLICATION, WITHOUT LONG-TERM CURRENT USE OF INSULIN (H): ICD-10-CM

## 2022-08-26 PROCEDURE — G0108 DIAB MANAGE TRN  PER INDIV: HCPCS

## 2022-08-26 RX ORDER — DULAGLUTIDE 1.5 MG/.5ML
1.5 INJECTION, SOLUTION SUBCUTANEOUS
Qty: 2 ML | Refills: 3 | Status: SHIPPED | OUTPATIENT
Start: 2022-08-26 | End: 2022-11-17

## 2022-08-26 ASSESSMENT — PAIN SCALES - GENERAL: PAINLEVEL: NO PAIN (0)

## 2022-08-26 NOTE — PATIENT INSTRUCTIONS
Decrease Trulicity back to 1.5 mg weekly    Return to Diabetes Ed for appointment with Reshma on 10/28/22 at 3:00 pm

## 2022-08-29 RX ORDER — BLOOD SUGAR DIAGNOSTIC
STRIP MISCELLANEOUS
Qty: 100 STRIP | Refills: 3 | Status: SHIPPED | OUTPATIENT
Start: 2022-08-29 | End: 2023-01-09

## 2022-08-29 NOTE — PROGRESS NOTES
Diabetes Self-Management Education & Support    Presents for: Follow-up    CDE VISIT MODE: In Person       ASSESSMENT:  Readings range as follows: fastin-180.    Edith is having GI symptoms/nausea with her Trulicity dose after it had been increased. Noted that BG readings are higher, as well    Patient's most recent   Lab Results   Component Value Date    A1C 6.6 2022    A1C 7.1 2020     is meeting goal of <7.0    Diabetes knowledge and skills assessment:   Patient is knowledgeable in diabetes management concepts related to: Healthy Eating, Being Active, Monitoring and Taking Medication    Continue education with the following diabetes management concepts: Monitoring and Taking Medication    Based on learning assessment above, most appropriate setting for further diabetes education would be: Individual setting.      PLAN  Decrease Trulicity back to 1.5 mg weekly    Return to Diabetes Ed for appointment with Reshma on 10/28/22 at 3:00 pm    Topics to cover at upcoming visits: Monitoring and Problem Solving    Follow-up: 10/28/22    See Care Plan for co-developed, patient-state behavior change goals.  AVS provided for patient today.    Education Materials Provided:  No new materials provided today      SUBJECTIVE/OBJECTIVE:  Presents for: Follow-up  Accompanied by: Self  Diabetes education in the past 24mo: Yes  Focus of Visit: Taking Medication  Diabetes type: Type 2  Disease course: Stable  Diabetes management related comments/concerns: Having nausea from increased dose of Trulicity, FBG are higher now with increased dose  Transportation concerns: No  Difficulty affording diabetes medication?: Sometimes  Difficulty affording diabetes testing supplies?: No  Other concerns:: None  Cultural Influences/Ethnic Background:  Choose not to answer      Diabetes Symptoms & Complications:  Fatigue: No  Neuropathy: No  Polydipsia: No  Polyphagia: No  Polyuria: No  Visual change: No  Slow healing wounds:  "No  Symptom course: Stable  Weight trend: Stable  Complications assessed today?: Yes  Autonomic neuropathy: No  CVA: No  Heart disease: No  Nephropathy: No  Peripheral neuropathy: No  Foot ulcerations: No  Peripheral Vascular Disease: No  Retinopathy: No  Sexual dysfunction: No    Patient Problem List and Family Medical History reviewed for relevant medical history, current medical status, and diabetes risk factors.    Vitals:  /85   Pulse 94   Resp 16   Ht 1.543 m (5' 0.75\")   Wt 87.5 kg (192 lb 14.4 oz)   LMP 03/07/2015 (Exact Date)   SpO2 99%   BMI 36.75 kg/m    Estimated body mass index is 36.75 kg/m  as calculated from the following:    Height as of this encounter: 1.543 m (5' 0.75\").    Weight as of this encounter: 87.5 kg (192 lb 14.4 oz).   Last 3 BP:   BP Readings from Last 3 Encounters:   08/26/22 125/85   05/10/22 130/84   04/28/22 (!) 140/90       History   Smoking Status     Never Smoker   Smokeless Tobacco     Never Used     Comment: no passive exposure       Labs:  Lab Results   Component Value Date    A1C 6.6 04/13/2022    A1C 7.1 08/27/2020     Lab Results   Component Value Date     04/13/2022     06/25/2018     Lab Results   Component Value Date    LDL 69 09/23/2021    LDL 78 04/08/2019     HDL Cholesterol   Date Value Ref Range Status   04/08/2019 73 >49 mg/dL Final     Direct Measure HDL   Date Value Ref Range Status   09/23/2021 53 >=50 mg/dL Final   ]  GFR Estimate   Date Value Ref Range Status   04/13/2022 >90 >60 mL/min/1.73m2 Final     Comment:     Effective December 21, 2021 eGFRcr in adults is calculated using the 2021 CKD-EPI creatinine equation which includes age and gender (Nathen roach al., NEJM, DOI: 10.1056/BNZQbc3886133)   06/03/2019 >90 >60 mL/min/[1.73_m2] Final     GFR Estimate If Black   Date Value Ref Range Status   06/03/2019 >90 >60 mL/min/[1.73_m2] Final     Lab Results   Component Value Date    CR 0.68 04/13/2022    CR 0.65 06/25/2018     No results " found for: MICROALBUMIN    Healthy Eating:  Healthy Eating Assessed Today: No  Cultural/Scientology diet restrictions?: No  Meals include: Breakfast, Lunch, Dinner  Breakfast: cereal or eggs/valenzuela  Dinner: chicken or tacos or roasts hamburgers/hot dogs, vegetables  Other: ice cream  Beverages: Water, Diet soda, Milk, Coffee, Juice  Has patient met with a dietitian in the past?: No    Being Active:  Being Active Assessed Today: Yes  Exercise:: Yes (treadmill)  How intense was your typical exercise? : Moderate (like brisk walking)  Barrier to exercise: None    Monitoring:  Monitoring Assessed Today: Yes  Did patient bring glucose meter to appointment? : Yes  Blood Glucose Meter: One Touch  Times checking blood sugar at home (number): 1  Times checking blood sugar at home (per): Day  Blood glucose trend: Increasing        Taking Medications:  Diabetes Medication(s)     Biguanides       metFORMIN (GLUCOPHAGE) 1000 MG tablet    TAKE 1 TABLET BY MOUTH TWICE DAILY WITH MEALS    Incretin Mimetic Agents (GLP-1 Receptor Agonists)       dulaglutide (TRULICITY) 1.5 MG/0.5ML pen    Inject 1.5 mg Subcutaneous every 7 days          Taking Medication Assessed Today: Yes  Current Treatments: Oral Medication (taken by mouth), Non-insulin Injectables  Problems taking diabetes medications regularly?: No  Diabetes medication side effects?: Yes (Nausea from increased dose of Trulicity)    Problem Solving:  Problem Solving Assessed Today: Yes  Is the patient at risk for hypoglycemia?: No  Hypoglycemia Frequency: Rarely  Hypoglycemia Treatment: Glucose (tablets or gel), Candy  Patient carries a carbohydrate source: No  Medical ID: No  Does patient have DKA prevention plan?: No  Does patient have severe weather/disaster plan for diabetes management?: No    Hypoglycemia symptoms  Confusion: No  Dizziness or Light-Headedness: No  Headaches: No  Hunger: Yes  Mood changes: No  Nervousness/Anxiety: No  Sleepiness: No  Speech difficulty:  No  Sweats: No  Feeling shaky: No    Hypoglycemia Complications  Blackouts: No  Hospitalization: No  Nocturnal hypoglycemia: No  Required assistance: No  Required glucagon injection: No  Seizures: No    Reducing Risks:  Reducing Risks Assessed Today: Yes  CAD Risks: Family history, Hypertension, Obesity, Stress  Has dilated eye exam at least once a year?: Yes  Sees dentist every 6 months?: Yes  Feet checked by healthcare provider in the last year?: No (due)    Healthy Coping:  Healthy Coping Assessed Today: Yes  Emotional response to diabetes: Confidence diabetes can be controlled  Informal Support system:: Children, Family, Parent, Spouse  Stage of change: ACTION (Actively working towards change)  Support resources: None  Patient Activation Measure Survey Score:  JAIME Score (Last Two) 12/19/2018   JAIME Raw Score 40   Activation Score 100   JAIME Level 4         Care Plan and Education Provided:  Care Plan: Diabetes   Updates made by Laura Mendoza RN since 9/2/2022 12:00 AM      Problem: HbA1C Not In Goal       Goal: Establish Regular Follow-Ups with PCP       Task: Discuss with PCP the recommended timing for patient's next follow up visit(s)    Responsible User: Laura Mendoza RN      Task: Discuss schedule for PCP visits with patient    Responsible User: Laura Mendoza RN      Goal: Get HbA1C Level in Goal       Task: Educate patient on diabetes education self-management topics    Responsible User: Laura Mendoza RN      Task: Educate patient on benefits of regular glucose monitoring    Responsible User: Laura Mendoza RN      Task: Refer patient to appropriate extended care team member, as needed (Medication Therapy Management, Behavioral Health, Physical Therapy, etc.)    Responsible User: Laura Mendoza RN      Task: Discuss diabetes treatment plan with patient    Responsible User: Laura Mendoza RN      Problem: Diabetes Self-Management Education Needed to Optimize Self-Care Behaviors       Goal: Understand diabetes pathophysiology  and disease progression       Task: Provide education on diabetes pathophysiology and disease progression specfic to patient's diabetes type    Responsible User: Laura Mendoza RN      Goal: Healthy Eating - follow a healthy eating pattern for diabetes       Task: Provide education on portion control and consistency in amount, composition and timing of food intake    Responsible User: Laura Mendoza RN      Task: Provide education on managing carbohydrate intake (carbohydrate counting, plate planning method, etc.)    Responsible User: Laura Mendoza RN      Task: Provide education on weight management    Responsible User: Laura Mendoza RN      Task: Provide education on heart healthy eating    Responsible User: Laura Mendoza RN      Task: Provide education on eating out    Responsible User: Laura Mendoza RN      Task: Develop individualized healthy eating plan with patient    Responsible User: Laura Mendoza RN      Goal: Being Active - get regular physical activity, working up to at least 150 minutes per week       Task: Provide education on relationship of activity to glucose and precautions to take if at risk for low glucose    Responsible User: Laura Mendoza RN      Task: Discuss barriers to physical activity with patient    Responsible User: Laura Mendoza RN      Task: Develop physical activity plan with patient    Responsible User: Laura Mendoza RN      Task: Explore community resources including walking groups, assistance programs, and home videos    Responsible User: Laura Mendoza RN      Goal: Monitoring - monitor glucose and ketones as directed       Task: Provide education on blood glucose monitoring (purpose, proper technique, frequency, glucose targets, interpreting results, when to use glucose control solution, sharps disposal)    Responsible User: Laura Mendoza RN      Task: Provide education on continuous glucose monitoring (sensor placement, use of saturnino or /reader, understanding glucose trends, alerts and alarms,  differences between sensor glucose and blood glucose)    Responsible User: Laura Mendoza RN      Task: Provide education on ketone monitoring (when to monitor, frequency, etc.)    Responsible User: Laura Mendoza RN      Goal: Taking Medication - patient is consistently taking medications as directed    Start Date: 8/26/2022   Expected End Date: 9/30/2022   Note:    Patient will decrease dose of Trulicity and we will see how she is doing at next visit     Task: Provide education on action of prescribed medication, including when to take and possible side effects    Priority: High   Outcome: Positive   Responsible User: Laura Mendoza RN      Task: Provide education on insulin and injectable diabetes medications, including administration, storage, site selection and rotation for injection sites    Responsible User: Laura Mendoza RN      Task: Discuss barriers to medication adherence with patient and provide management technique ideas as appropriate    Responsible User: Laura Mendoza RN      Task: Provide education on frequency and refill details of medications    Responsible User: aLura Mendoza RN      Goal: Problem Solving - know how to prevent and manage short-term diabetes complications       Task: Provide education on high blood glucose - causes, signs/symptoms, prevention and treatment    Responsible User: Laura Mendoza RN      Task: Provide education on low blood glucose - causes, signs/symptoms, prevention, treatment, carrying a carbohydrate source at all times, and medical identification    Responsible User: Laura Mendoza RN      Task: Provide education on safe travel with diabetes    Responsible User: Laura Mendoza RN      Task: Provide education on how to care for diabetes on sick days    Responsible User: Laura Mendoza RN      Task: Provide education on when to call a health care provider    Responsible User: Laura Mendoza RN      Goal: Reducing Risks - know how to prevent and treat long-term diabetes complications       Task:  Provide education on major complications of diabetes, prevention, early diagnostic measures and treatment of complications    Responsible User: Laura Mendoza RN      Task: Provide education on recommended care for dental, eye and foot health    Responsible User: Laura Mendoza RN      Task: Provide education on Hemoglobin A1c - goals and relationship to blood glucose levels    Responsible User: Laura Mendoza RN      Task: Provide education on recommendations for heart health - lipid levels and goals, blood pressure and goals, and aspirin therapy, if indicated    Responsible User: Laura Mendoza RN      Task: Provide education on tobacco cessation    Responsible User: Laura Mendoza RN      Goal: Healthy Coping - use available resources to cope with the challenges of managing diabetes       Task: Discuss recognizing feelings about having diabetes    Responsible User: Laura Mendoza RN      Task: Provide education on the benefits of making appropriate lifestyle changes    Responsible User: Laura Mendoza RN      Task: Provide education on benefits of utilizing support systems    Responsible User: Laura Mendoza RN      Task: Discuss methods for coping with stress    Responsible User: Laura Mendoza RN      Task: Provide education on when to seek professional counseling    Responsible User: Laura Mendoza RN              Time Spent: 30 minutes  Encounter Type: Individual    Any diabetes medication dose changes were made via the CDE Protocol per the patient's primary care provider. A copy of this encounter was shared with the provider.

## 2022-09-04 ENCOUNTER — HEALTH MAINTENANCE LETTER (OUTPATIENT)
Age: 52
End: 2022-09-04

## 2022-09-10 DIAGNOSIS — J45.20 MILD INTERMITTENT ASTHMA WITHOUT COMPLICATION: ICD-10-CM

## 2022-09-12 NOTE — TELEPHONE ENCOUNTER
Levalbuterol 45 mcg/act inhaler    Last Written Prescription Date:  4/13/22  Last Fill Quantity: 45g,   # refills: 3  Last Office Visit: 4/28/22  Future Office visit:

## 2022-09-14 RX ORDER — LEVALBUTEROL TARTRATE 45 UG/1
AEROSOL, METERED ORAL
Qty: 45 G | Refills: 3 | Status: SHIPPED | OUTPATIENT
Start: 2022-09-14 | End: 2024-08-15

## 2022-09-22 DIAGNOSIS — J30.1 SEASONAL ALLERGIC RHINITIS DUE TO POLLEN: ICD-10-CM

## 2022-09-22 RX ORDER — FLUTICASONE PROPIONATE 50 MCG
SPRAY, SUSPENSION (ML) NASAL
Qty: 16 G | Refills: 3 | Status: SHIPPED | OUTPATIENT
Start: 2022-09-22 | End: 2022-12-23

## 2022-09-22 NOTE — TELEPHONE ENCOUNTER
Flonase      Last Written Prescription Date:  2/28/22  Last Fill Quantity: 16 g,   # refills: 3  Last Office Visit: 4/28/22  Future Office visit:       Routing refill request to provider for review/approval because:

## 2022-10-07 DIAGNOSIS — E11.9 TYPE 2 DIABETES MELLITUS WITHOUT COMPLICATION, WITHOUT LONG-TERM CURRENT USE OF INSULIN (H): ICD-10-CM

## 2022-10-07 NOTE — TELEPHONE ENCOUNTER
metFORMIN      Last Written Prescription Date:  8/22/22  Last Fill Quantity: 80,   # refills: 0  Last Office Visit: 4/25/22  Future Office visit:    Next 5 appointments (look out 90 days)    Nov 16, 2022  4:30 PM  (Arrive by 4:15 PM)  SHORT with LALY Burkett  [facility] [place] ([facility] [place] ) [address]  [place] [address]  [phone]           Routing refill request to provider for review/approval because:

## 2022-10-10 DIAGNOSIS — E11.9 TYPE 2 DIABETES MELLITUS WITHOUT COMPLICATION, WITHOUT LONG-TERM CURRENT USE OF INSULIN (H): ICD-10-CM

## 2022-10-10 NOTE — TELEPHONE ENCOUNTER
metFORMIN      Last Written Prescription Date:  10/7/22  Last Fill Quantity: 60,   # refills: 0  Last Office Visit: 4/28/22  Future Office visit:    Next 5 appointments (look out 90 days)    Nov 16, 2022  4:30 PM  (Arrive by 4:15 PM)  SHORT with LALY Burkett  [facility] [place] ([facility] [place] ) [address]  [place] [address]  [phone]           Routing refill request to provider for review/approval because:

## 2022-10-20 ENCOUNTER — APPOINTMENT (OUTPATIENT)
Dept: GENERAL RADIOLOGY | Facility: HOSPITAL | Age: 52
End: 2022-10-20
Attending: NURSE PRACTITIONER
Payer: COMMERCIAL

## 2022-10-20 ENCOUNTER — HOSPITAL ENCOUNTER (EMERGENCY)
Facility: HOSPITAL | Age: 52
Discharge: HOME | End: 2022-10-20
Attending: NURSE PRACTITIONER | Admitting: NURSE PRACTITIONER
Payer: COMMERCIAL

## 2022-10-20 VITALS
DIASTOLIC BLOOD PRESSURE: 82 MMHG | RESPIRATION RATE: 16 BRPM | OXYGEN SATURATION: 98 % | BODY MASS INDEX: 36.54 KG/M2 | HEART RATE: 84 BPM | SYSTOLIC BLOOD PRESSURE: 141 MMHG | TEMPERATURE: 98 F | WEIGHT: 191.8 LBS

## 2022-10-20 DIAGNOSIS — M25.421 PAIN AND SWELLING OF RIGHT ELBOW: ICD-10-CM

## 2022-10-20 DIAGNOSIS — M25.521 PAIN AND SWELLING OF RIGHT ELBOW: ICD-10-CM

## 2022-10-20 PROCEDURE — 99213 OFFICE O/P EST LOW 20 MIN: CPT | Performed by: NURSE PRACTITIONER

## 2022-10-20 PROCEDURE — 73090 X-RAY EXAM OF FOREARM: CPT | Mod: RT

## 2022-10-20 PROCEDURE — 73070 X-RAY EXAM OF ELBOW: CPT | Mod: RT

## 2022-10-20 PROCEDURE — G0463 HOSPITAL OUTPT CLINIC VISIT: HCPCS

## 2022-10-20 ASSESSMENT — ENCOUNTER SYMPTOMS
COLOR CHANGE: 0
CHILLS: 0
ACTIVITY CHANGE: 1
NAUSEA: 0
VOMITING: 0
NUMBNESS: 0
FEVER: 0
SHORTNESS OF BREATH: 0

## 2022-10-20 NOTE — DISCHARGE INSTRUCTIONS
Keep affected extremity elevated as much as possible for next 24 - 48 hours. Ice to affected area 20 minutes every hour as needed for comfort. After 48 hours you can apply heat. Ibuprofen 600 to 800 mg (3 - 4 tabs of over the counter med) every six to eight hours as needed;not to exceed maximum amount of 3200 mg in 24 hours. Take with food. Tylenol 650 to 1000 mg every four to six hours as needed (not to exceed more than 4000 mg in a 24 hour period). May use interchangeably. Suggest medicating around the clock for the next 24-48 hours. Use ace wrap and arm sling  until you have completed your follow-up appointment.  Follow up with primary provider  as needed  Orthopedic referral placed

## 2022-10-20 NOTE — ED PROVIDER NOTES
History     Chief Complaint   Patient presents with     Arm Pain     HPI  Edith Doty is a 51 year old female who presents with a 4-week history of right arm/elbow pain that started after she fell.on her arm, 4 weeks ago.  Has swelling over her elbow, accompanied with weakness and tingling in her right arm.  Left hand dominant.  Has taken acetaminophen; last dose 2 days ago.  Non-smoker.  Denies previous injuries.  Denies fevers, chills, nausea, and vomiting.    Musculoskeletal problem/pain      Duration: four weeks ago    Description  Location: right arm. Left hand dominant    Intensity:  5/10 tight, sore, burning medial epicondyle.    Accompanying signs and symptoms: tingling, weakness of right arm and swelling    History  Previous similar problem: no   Previous evaluation:  none    Precipitating or alleviating factors:  Trauma or overuse: YES- fell on her arm four weeks ago and is a  by trade  Aggravating factors include: lifting and pronate and supinate    Therapies tried and outcome: acetaminophen last dose two days ago    Allergies:  Allergies   Allergen Reactions     Amoxicillin Trihydrate Itching     Augmentin       Clavulanic Acid Potassium Itching     Augmentin       Levaquin [Levofloxacin] Swelling     Penicillins Hives       Problem List:    Patient Active Problem List    Diagnosis Date Noted     Adjustment disorder with anxious mood 05/12/2019     Priority: Medium     Type 2 diabetes mellitus without complication, without long-term current use of insulin (H) 08/30/2017     Priority: Medium     Obesity 03/02/2014     Priority: Medium     Allergic rhinitis due to pollen 04/25/2011     Priority: Medium     HTN (hypertension) 04/25/2011     Priority: Medium     Hypothyroidism, acquired 01/01/2011     Priority: Medium     Asthma, mild intermittent 01/01/2011     Priority: Medium     Asthma 10/29/2009     Priority: Medium     Overview:   Seasonal. [facility] record.        Hypertension  10/29/2009     Priority: Medium     Overview:   [facility] record.        Achilles tendon tear 10/28/2009     Priority: Medium     Chronic lymphocytic thyroiditis 01/28/2009     Priority: Medium     Nontoxic uninodular goiter 11/28/2007     Priority: Medium        Past Medical History:    Past Medical History:   Diagnosis Date     Allergic Rhinitis, Seasonal 04/25/2011     Asthma 01/01/2011     Hypertension, Benign 04/25/2011     Hypothyroidism 01/01/2011     Nontoxic uninodular goiter 11/28/2007     Prediabetes 04/25/2011       Past Surgical History:    Past Surgical History:   Procedure Laterality Date     CHOLECYSTECTOMY  01/01/1996     eye surgery for strabismus at ages 2 & 4       HYSTERECTOMY TOTAL ABDOMINAL, SALPINGECTOMY Bilateral 07/02/2018    [facility]. Ovaries remain. 10 cm uterine mass removed: well circumscribed mass consistent with leiomyoma.     HYSTERECTOMY, PAP NO LONGER INDICATED N/A 07/02/2018    Cervix removed per Hyst path report.       Family History:    Family History   Problem Relation Age of Onset     Diabetes Father      Other - See Comments Sister         endometriosis     Allergies Sister         environmental     Other - See Comments Mother         fibromyalgia and endometreosis     Heart Disease Mother         murmer     Allergies Mother         environmental     Hypertension Brother      Allergies Brother         environmental     Asthma No family hx of      Thyroid Disease No family hx of        Social History:  Marital Status:   [2]  Social History     Tobacco Use     Smoking status: Never     Smokeless tobacco: Never     Tobacco comments:     no passive exposure   Vaping Use     Vaping Use: Never used   Substance Use Topics     Alcohol use: Yes     Alcohol/week: 0.0 standard drinks     Comment: socially      Drug use: No        Medications:    fluticasone (FLONASE) 50 MCG/ACT nasal spray  metFORMIN (GLUCOPHAGE) 1000 MG tablet  ACCU-CHEK GUIDE test strip  amLODIPine  (NORVASC) 10 MG tablet  blood glucose monitoring (SOFTCLIX) lancets  chlorthalidone (HYGROTON) 25 MG tablet  dulaglutide (TRULICITY) 1.5 MG/0.5ML pen  Lancets (ONETOUCH DELICA PLUS SAEUDM46O) MISC  levalbuterol (XOPENEX HFA) 45 MCG/ACT inhaler  levothyroxine (SYNTHROID/LEVOTHROID) 150 MCG tablet  lisinopril (ZESTRIL) 10 MG tablet  metoprolol succinate ER (TOPROL-XL) 100 MG 24 hr tablet  metoprolol succinate ER (TOPROL-XL) 50 MG 24 hr tablet          Review of Systems   Constitutional: Positive for activity change. Negative for chills and fever.   Respiratory: Negative for shortness of breath.    Gastrointestinal: Negative for nausea and vomiting.   Musculoskeletal:        Right elbow swelling and pain that extends into lower arm   Skin: Negative for color change.   Neurological: Negative for numbness (tingling right arm).       Physical Exam   BP: 141/82  Pulse: 84  Temp: 98  F (36.7  C)  Resp: 16  Weight: 87 kg (191 lb 12.8 oz)  SpO2: 98 %      Physical Exam  Vitals and nursing note reviewed.   Constitutional:       General: She is not in acute distress.     Appearance: She is overweight.   Cardiovascular:      Rate and Rhythm: Normal rate.   Pulmonary:      Effort: Pulmonary effort is normal.   Musculoskeletal:         General: Swelling and tenderness present.      Right upper arm: Swelling (Moderate distal two thirds) and tenderness present. No bony tenderness.      Right elbow: Swelling (Moderate) present. Decreased range of motion. Tenderness present in medial epicondyle and olecranon process.      Right forearm: Swelling (Proximal one third), tenderness and bony tenderness present.      Right wrist: Tenderness (Tenderness in upper forearm with flexion) present. No snuff box tenderness. Decreased range of motion. Normal pulse.        Arms:       Comments: Left elbow/upper arm/proximal forearm  Radial pulse 3+   Skin:     General: Skin is warm and dry.      Findings: No bruising or erythema.   Neurological:       Mental Status: She is alert.         ED Course                 Procedures           Results for orders placed or performed during the hospital encounter of 10/20/22 (from the past 24 hour(s))   Radius/Ulna XR, PA & LAT, right    Narrative    Exam: XR FOREARM RIGHT 2 VIEWS, XR ELBOW RIGHT 2 VIEWS    Technique: Right elbow, 2 views, and right forearm, 2 views    Comparison: None.    Exam reason: pain over medial epicondyle of elbow and ulna with  pronation/supination and palpation, swollen    Findings:  No acute fracture or dislocation. There is a small osseous fragment at  the medial epicondyle, which may be due to sequela of remote injury or  medial epicondylitis.     Soft tissues appear normal.      Impression    Impression:  No acute fracture or dislocation.    Small osseous fragment at the medial epicondyle, which may be sequela  of remote injury or due to medial epicondylitis.    LORENA CHU MD         SYSTEM ID:  [id number]   Elbow XR, 2 views, right    Narrative    Exam: XR FOREARM RIGHT 2 VIEWS, XR ELBOW RIGHT 2 VIEWS    Technique: Right elbow, 2 views, and right forearm, 2 views    Comparison: None.    Exam reason: pain over medial epicondyle of elbow and ulna with  pronation/supination and palpation, swollen    Findings:  No acute fracture or dislocation. There is a small osseous fragment at  the medial epicondyle, which may be due to sequela of remote injury or  medial epicondylitis.     Soft tissues appear normal.      Impression    Impression:  No acute fracture or dislocation.    Small osseous fragment at the medial epicondyle, which may be sequela  of remote injury or due to medial epicondylitis.    LORENA CHU MD         SYSTEM ID:  [email or web address]       Medications - No data to display    Assessments & Plan (with Medical Decision Making)     I have reviewed the nursing notes.    I have reviewed the findings, diagnosis, plan and need for follow up with the patient.  (M25.521,  M25.421) Pain and  swelling of right elbow  Comment: 51 year old female who presents with a 4-week history of right arm/elbow pain that started after she fell.on her arm, 4 weeks ago.  Has swelling over her elbow, accompanied with weakness and tingling in her right arm.  Left hand dominant.  Has taken acetaminophen; last dose 2 days ago.  Non-smoker.  Denies previous injuries.  Denies fevers, chills, nausea, and vomiting.    MDM: Moderate amount of swelling around right elbow that extends into upper and lower arm.  Tenderness with palpation along the ulna and medial Epicondyle.  Pain with supination/pronation.  Radial pulse 3+ no erythema or ecchymosis    Right elbow/forearm x-rays reviewed and per radiology;No acute fracture or dislocation.  Small osseous fragment at the medial epicondyle, which may be sequela  of remote injury or due to medial epicondylitis.    Ace wrap applied to elbow and right arm placed in sling    Plan: RICE.  Keep affected extremity elevated as much as possible for next 24 - 48 hours. Ice to affected area 20 minutes every hour as needed for comfort. After 48 hours you can apply heat. Ibuprofen 600 to 800 mg (3 - 4 tabs of over the counter med) every six to eight hours as needed;not to exceed maximum amount of 3200 mg in 24 hours. Take with food. Tylenol 650 to 1000 mg every four to six hours as needed (not to exceed more than 4000 mg in a 24 hour period). May use interchangeably. Suggest medicating around the clock for the next 24-48 hours. Use ace wrap and arm sling  until you have completed your follow-up appointment.  Follow up with primary provider  as needed   Orthopedic  Referral placed        These discharge instructions and medications were reviewed with her and understanding verbalized.    This document was prepared using a combination of typing and voice generated software.  While every attempt was made for accuracy, spelling and grammatical errors may exist.    Discharge Medication List as  of 10/20/2022  4:31 PM          Final diagnoses:   Pain and swelling of right elbow       10/20/2022   [facility]       Mary Yu, CNP  10/20/22 4101

## 2022-10-20 NOTE — ED TRIAGE NOTES
Pt presents with c/o right arm pain. States it might be in her elbow but pain goes from shoulder down to forearm. States she can't turn it to the left. Also can't extend arm. Sx has been developing over time but increased pain started in September. Pt did have a fall in September that increased pain. CMS intact. Pt has been taking ibuprofen but did not take anything today.

## 2022-10-24 ENCOUNTER — TRANSFERRED RECORDS (OUTPATIENT)
Dept: HEALTH INFORMATION MANAGEMENT | Facility: CLINIC | Age: 52
End: 2022-10-24

## 2022-10-28 ENCOUNTER — HOSPITAL ENCOUNTER (OUTPATIENT)
Dept: EDUCATION SERVICES | Facility: HOSPITAL | Age: 52
Discharge: HOME | End: 2022-10-28
Attending: NURSE PRACTITIONER | Admitting: NURSE PRACTITIONER
Payer: COMMERCIAL

## 2022-10-28 VITALS
BODY MASS INDEX: 36.57 KG/M2 | RESPIRATION RATE: 16 BRPM | WEIGHT: 193.7 LBS | HEART RATE: 80 BPM | HEIGHT: 61 IN | DIASTOLIC BLOOD PRESSURE: 82 MMHG | SYSTOLIC BLOOD PRESSURE: 130 MMHG | OXYGEN SATURATION: 99 %

## 2022-10-28 DIAGNOSIS — E11.9 TYPE 2 DIABETES MELLITUS WITHOUT COMPLICATION, WITHOUT LONG-TERM CURRENT USE OF INSULIN (H): Primary | ICD-10-CM

## 2022-10-28 LAB — HBA1C MFR BLD: 7.1 % (ref 4.3–?)

## 2022-10-28 PROCEDURE — 83036 HEMOGLOBIN GLYCOSYLATED A1C: CPT | Performed by: PHYSICIAN ASSISTANT

## 2022-10-28 PROCEDURE — G0108 DIAB MANAGE TRN  PER INDIV: HCPCS

## 2022-10-28 ASSESSMENT — PAIN SCALES - GENERAL: PAINLEVEL: NO PAIN (0)

## 2022-10-28 NOTE — PROGRESS NOTES
Diabetes Self-Management Education & Support    Presents for: Individual review    Type of Service: In Person Visit    Assessment Type: established.     ASSESSMENT:  [place], 51 year old female, here for BG review, A1C. Up slightly at 7.1%. Previously 6.6 in April 2022. Previously, increased Trulicity dose to 3 mg and felt nauseous, noted increased BG with increased dose. Pt was decreased in August back to 1.5 mg once weekly. Since decreasing, her nausea has gone away and feels better overall.   Per BG report compared to August report, BG is trending down to target range despite higher A1C today. Discussed slowing increasing exercise in short increments (walking), has a treadmill available to use. Would like to avoid medication changes at this time and recheck A1C in January when able to next to see if bg continues to improve.     2 week BG report from meter: daily checks with average of 140.   Lowest 128- highest 164.  Prior report average was 146-180 fasting.   Target for fasting is . Post meals <180. Pt has not checked post meals, schedules varies and has a hard time remembering to check after meals.     /82. Weight 193# (up 1 # since August visit). NOT on STATIN or ASA. No tobacco use.     Patient's most recent   Lab Results   Component Value Date    A1C 6.6 04/13/2022    A1C 7.1 08/27/2020    HEMOGLOBINA1 7.1 10/28/2022     is not meeting goal of <7.0    Diabetes knowledge and skills assessment:   Patient is knowledgeable in diabetes management concepts related to: Taking Medication  Continue education with the following diabetes management concepts: Healthy Eating, Being Active, Monitoring, Taking Medication, Problem Solving, Reducing Risks and Healthy Coping  Based on learning assessment above, most appropriate setting for further diabetes education would be: Individual setting.      PLAN  Continue current plan. Follow up 3 months, work on diet, exercise.      A1C was 7.1%  today. Goal is less than   7% (up from 6.6 in April).     Alternate checking blood sugars 1x/day when able.  Fasting in the morning or 2 hours after largest meal.   Target blood sugar: Fasting or before meals target is . 2 hours after meal <180.     Medications: Continue Metformin 1000 mg twice daily. Trulicity 1.5 mg once weekly.     Limit higher carbohydrate foods such as: rice, breads, cereal, pasta, sweets. Avoid sugary drinks.  Good snack examples: meat, cheese, cottage cheese, nuts, hard boiled egg, veggies, fruit/berries, yogurt     Exercise: increase exercise as tolerated, (even multiple short times during your day helps). Goal is 150 minutes/week- 30 minutes 5 days of the week.   Weight: 193#    Foot exam: due, PCP to check at next visit.  (Yearly)   Eye exam: February 2022 (yearly)     Follow up: 1/30/2023 at 3 pm.  Please bring your meter to your appointment.   Provided contact informatio: telephone and my chart.     Topics to cover at upcoming visits: Healthy Eating, Being Active, Monitoring, Taking Medication, Problem Solving, Reducing Risks and Healthy Coping  See Care Plan for co-developed, patient-state behavior change goals.  AVS provided for patient today.    Education Materials Provided:  No new materials provided today      SUBJECTIVE/OBJECTIVE:  Presents for: Individual review  Accompanied by: Self  Diabetes education in the past 24mo: Yes  Focus of Visit: Monitoring, Taking Medication  Diabetes type: Type 2  Date of diagnosis: August 2017  Disease course: Other  How confident are you filling out medical forms by yourself:: Extremely  Diabetes management related comments/concerns: none  Transportation concerns: No  Difficulty affording diabetes medication?: No  Difficulty affording diabetes testing supplies?: No  Other concerns:: None  Cultural Influences/Ethnic Background:  Choose not to answer    Diabetes Symptoms & Complications:  Fatigue: No  Neuropathy: No  Polydipsia: No  Polyphagia: No  Polyuria: No  Visual  "change: No  Slow healing wounds: No  Symptom course: Stable  Weight trend: Stable  Complications assessed today?: Yes  Autonomic neuropathy: No  CVA: No  Heart disease: No  Nephropathy: No  Peripheral neuropathy: No  Peripheral Vascular Disease: No  Retinopathy: No  Sexual dysfunction: No    Patient Problem List and Family Medical History reviewed for relevant medical history, current medical status, and diabetes risk factors.    Vitals:  /82   Pulse 80   Resp 16   Ht 1.543 m (5' 0.75\")   Wt 87.9 kg (193 lb 11.2 oz)   LMP 03/07/2015 (Exact Date)   SpO2 99%   BMI 36.90 kg/m    Estimated body mass index is 36.9 kg/m  as calculated from the following:    Height as of this encounter: 1.543 m (5' 0.75\").    Weight as of this encounter: 87.9 kg (193 lb 11.2 oz).   Last 3 BP:   BP Readings from Last 3 Encounters:   10/28/22 130/82   10/20/22 141/82   08/26/22 125/85       History   Smoking Status     Never   Smokeless Tobacco     Never       Labs:  Lab Results   Component Value Date    A1C 6.6 04/13/2022    A1C 7.1 08/27/2020    HEMOGLOBINA1 7.1 10/28/2022     Lab Results   Component Value Date     04/13/2022     06/25/2018     Lab Results   Component Value Date    LDL 69 09/23/2021    LDL 78 04/08/2019     HDL Cholesterol   Date Value Ref Range Status   04/08/2019 73 >49 mg/dL Final     Direct Measure HDL   Date Value Ref Range Status   09/23/2021 53 >=50 mg/dL Final   ]  GFR Estimate   Date Value Ref Range Status   04/13/2022 >90 >60 mL/min/1.73m2 Final     Comment:     Effective December 21, 2021 eGFRcr in adults is calculated using the 2021 CKD-EPI creatinine equation which includes age and gender (Nathen roach al., NEJM, DOI: 10.1056/CHEDnj6254824)   06/03/2019 >90 >60 mL/min/[1.73_m2] Final     GFR Estimate If Black   Date Value Ref Range Status   06/03/2019 >90 >60 mL/min/[1.73_m2] Final     Lab Results   Component Value Date    CR 0.68 04/13/2022    CR 0.65 06/25/2018     No results found " for: MICROALBUMIN    Healthy Eating:  Healthy Eating Assessed Today: Yes  Cultural/[employer] diet restrictions?: No  Meal planning/habits: Carb counting, Smaller portions  How many times a week on average do you eat food made away from home (restaurant/take-out)?: 2  Meals include: Breakfast, Dinner, Afternoon Snack  Breakfast: cereal and maybe toast.  Lunch: depends on work schedule. left overs. chick parm and rice.  Dinner: chicken and rice, vegetable. pizza.  Snacks: banana or apple. or pretzels, handful.  Beverages: Water, Milk, Juice  Has patient met with a dietitian in the past?: No    Being Active:  Being Active Assessed Today: No  Exercise:: Yes (trying to walk more.)  Days per week of moderate to strenuous exercise (like a brisk walk): 1  On average, minutes per day of exercise at this level: 20  How intense was your typical exercise? : Moderate (like brisk walking)  Exercise Minutes per Week: 20  Barrier to exercise: Time    Monitoring:  Monitoring Assessed Today: Yes  Did patient bring glucose meter to appointment? : Yes  Blood Glucose Meter: Accu-chek  Times checking blood sugar at home (number): 1 (checks fasting. forgets post meal- schedules vary.)  Times checking blood sugar at home (per): Day  Blood glucose trend: Decreasing (A1C slightly elevated from April. BG report comparted to last visit, improving)    Taking Medications:  Diabetes Medication(s)     Biguanides       metFORMIN (GLUCOPHAGE) 1000 MG tablet    TAKE 1 TABLET BY MOUTH TWICE DAILY WITH MEALS    Incretin Mimetic Agents (GLP-1 Receptor Agonists)       dulaglutide (TRULICITY) 1.5 MG/0.5ML pen    Inject 1.5 mg Subcutaneous every 7 days          Taking Medication Assessed Today: Yes  Current Treatments: Oral Medication (taken by mouth), Non-insulin Injectables (Trulicity 1.5 mg once weekly (Mondays) Metformin 1000 mg twice daily.)  Problems taking diabetes medications regularly?: No  Diabetes medication side effects?: No    Problem  Solving:  Problem Solving Assessed Today: Yes  Is the patient at risk for hypoglycemia?: No    Reducing Risks:  Reducing Risks Assessed Today: Yes  Diabetes Risks: Age over 45 years, Hypertriglyceridemia  CAD Risks: Diabetes Mellitus, Family history, Hypertension, Obesity, Sedentary lifestyle, Stress, Dyslipidemia  Has dilated eye exam at least once a year?: Yes (February 2022)  Sees dentist every 6 months?: Yes  Feet checked by healthcare provider in the last year?: No (due, has pcp in November.)    Healthy Coping:  Healthy Coping Assessed Today: Yes  Emotional response to diabetes: Acceptance  Informal Support system:: Children, Family, Parent, Spouse  Stage of change: ACTION (Actively working towards change)  Patient Activation Measure Survey Score:  JAIME Score (Last Two) 12/19/2018   JAIME Raw Score 40   Activation Score 100   JAIME Level 4       Care Plan and Education Provided:  Care Plan: Diabetes   Updates made by Reshma Sherman RN since 10/28/2022 12:00 AM      Problem: HbA1C Not In Goal       Goal: Get HbA1C Level in Goal    Note:    Alternate checking BG fasting and post prandial     Task: Educate patient on benefits of regular glucose monitoring Completed 10/28/2022   Responsible User: Laura Mendoza RN      Task: Discuss diabetes treatment plan with patient Completed 10/28/2022   Responsible User: Laura Mendoza RN      Problem: Diabetes Self-Management Education Needed to Optimize Self-Care Behaviors       Goal: Healthy Eating - follow a healthy eating pattern for diabetes       Task: Provide education on managing carbohydrate intake (carbohydrate counting, plate planning method, etc.) Completed 10/28/2022   Responsible User: Laura Mendoza RN      Goal: Being Active - get regular physical activity, working up to at least 150 minutes per week    Note:    Encourage short increments, build up as able.        Task: Discuss barriers to physical activity with patient Completed 10/28/2022   Responsible User: Laura Mendoza  RN      Goal: Taking Medication - patient is consistently taking medications as directed    Start Date: 8/26/2022   Expected End Date: 9/30/2022   Note:    Patient will decrease dose of Trulicity and we will see how she is doing at next visit.   10/28- improved GI sx. BG report trending down. Continue current plan, recheck 3 months.      Task: Provide education on frequency and refill details of medications Completed 10/28/2022   Responsible User: Laura Mendoza RN      Goal: Problem Solving - know how to prevent and manage short-term diabetes complications    Note:    Taking trip, discussed Trulicity timing.      Task: Provide education on safe travel with diabetes Completed 10/28/2022   Responsible User: Laura Mendoza RN      Goal: Reducing Risks - know how to prevent and treat long-term diabetes complications       Task: Provide education on recommended care for dental, eye and foot health Completed 10/28/2022   Responsible User: Laura Mendoza RN      Task: Provide education on Hemoglobin A1c - goals and relationship to blood glucose levels Completed 10/28/2022   Responsible User: Laura Mendoza RN          Time Spent: 30 minutes  Encounter Type: Individual    Any diabetes medication dose changes were made via the CDE Protocol per the patient's primary care provider. A copy of this encounter was shared with the provider.  Reshma Sherman RN Diabetes Educator,  [phone]  10/28/2022 at 4:13 PM

## 2022-10-28 NOTE — PATIENT INSTRUCTIONS
Recap of our visit:     A1C review:  Your A1C was 7.1%  today. Goal is less than  7%    Alternate checking blood sugars 1x/day when able.  Fasting in the morning or 2 hours after your largest meal are good times to check.   Target blood sugar: Fasting or before meals target is . 2 hours after meal <180.     Medications: Continue Metformin 1000 mg twice daily. Trulicity 1.5 mg once weekly.     Limit higher carbohydrate foods such as: rice, breads, cereal, pasta, sweets. Avoid sugary drinks.  Good snack examples: meat, cheese, cottage cheese, nuts, hard boiled egg, veggies, fruit/berries, yogurt     Exercise: increase exercise as tolerated, (even multiple short times during your day helps). Goal is 150 minutes/week- 30 minutes 5 days of the week.   Weight: 193#    Foot exam: due, PCP to check at next visit.  (Yearly)   Eye exam: February 2022 (yearly)       Follow up: 1/30/2023 at 3 pm.  Please bring your meter to your appointment.     If you have any questions or concerns, please call me at [phone] or send [employer] message.    Thank you for coming in today!  Reshma Sherman, RN Diabetes Educator

## 2022-11-05 DIAGNOSIS — E11.9 TYPE 2 DIABETES MELLITUS WITHOUT COMPLICATION, WITHOUT LONG-TERM CURRENT USE OF INSULIN (H): ICD-10-CM

## 2022-11-07 NOTE — TELEPHONE ENCOUNTER
metFORMIN      Last Written Prescription Date:  10/10/22  Last Fill Quantity: 180,   # refills: 0  Last Office Visit: 4/28/22  Future Office visit:    Next 5 appointments (look out 90 days)    Nov 09, 2022  3:30 PM  (Arrive by 3:15 PM)  SHORT with LALY Burkett  [facility] ([facility] ) [address] [place] [address]  [phone]   Nov 16, 2022  4:30 PM  (Arrive by 4:15 PM)  SHORT with LALY Burkett  [facility] ([facility] [place] ) [address]  [facility] [address]  [phone]           Routing refill request to provider for review/approval because:

## 2022-11-09 ENCOUNTER — OFFICE VISIT (OUTPATIENT)
Dept: FAMILY MEDICINE | Facility: OTHER | Age: 52
End: 2022-11-09
Attending: PHYSICIAN ASSISTANT
Payer: COMMERCIAL

## 2022-11-09 ENCOUNTER — TRANSFERRED RECORDS (OUTPATIENT)
Dept: HEALTH INFORMATION MANAGEMENT | Facility: CLINIC | Age: 52
End: 2022-11-09

## 2022-11-09 VITALS
HEIGHT: 61 IN | BODY MASS INDEX: 36.44 KG/M2 | SYSTOLIC BLOOD PRESSURE: 136 MMHG | TEMPERATURE: 97.9 F | WEIGHT: 193 LBS | RESPIRATION RATE: 12 BRPM | DIASTOLIC BLOOD PRESSURE: 88 MMHG | HEART RATE: 94 BPM | OXYGEN SATURATION: 99 %

## 2022-11-09 DIAGNOSIS — M77.11 LATERAL EPICONDYLITIS OF RIGHT ELBOW: ICD-10-CM

## 2022-11-09 DIAGNOSIS — Z23 NEED FOR PROPHYLACTIC VACCINATION AND INOCULATION AGAINST INFLUENZA: Primary | ICD-10-CM

## 2022-11-09 PROCEDURE — 99213 OFFICE O/P EST LOW 20 MIN: CPT | Performed by: PHYSICIAN ASSISTANT

## 2022-11-09 RX ORDER — LEVOTHYROXINE SODIUM 150 UG/1
1 TABLET ORAL
COMMUNITY
Start: 2022-11-03 | End: 2022-11-09

## 2022-11-09 ASSESSMENT — ASTHMA QUESTIONNAIRES: ACT_TOTALSCORE: 22

## 2022-11-09 ASSESSMENT — PAIN SCALES - GENERAL: PAINLEVEL: MODERATE PAIN (4)

## 2022-11-09 NOTE — NURSING NOTE
"No chief complaint on file.      Initial /88 (BP Location: Left arm, Patient Position: Sitting, Cuff Size: Adult Regular)   Pulse 94   Temp 97.9  F (36.6  C) (Tympanic)   Resp 12   Ht 1.543 m (5' 0.75\")   Wt 87.5 kg (193 lb)   LMP 03/07/2015 (Exact Date)   SpO2 99%   BMI 36.77 kg/m   Estimated body mass index is 36.77 kg/m  as calculated from the following:    Height as of this encounter: 1.543 m (5' 0.75\").    Weight as of this encounter: 87.5 kg (193 lb).  Medication Reconciliation: complete  Rosario Nolen LPN      "

## 2022-11-09 NOTE — PROGRESS NOTES
"  Assessment & Plan     Need for prophylactic vaccination and inoculation against influenza  She is given this.   - INFLUENZA QUAD, RECOMBINANT, P-FREE (RIV4) (FLUBLOK)    Lateral epicondylitis of right elbow  She can't move her elbow lateral and medial elbow . Ice heat ibuprofen and then see PT.  Working on movement and hope will be able to reduce her pain.  Xray was reviewed. Osseous deformity noted on her xray but not broken.     - Physical Therapy Referral; Future      5 minutes spent on the date of the encounter doing chart review, history and exam, documentation and further activities per the note       BMI:   Estimated body mass index is 36.77 kg/m  as calculated from the following:    Height as of this encounter: 1.543 m (5' 0.75\").    Weight as of this encounter: 87.5 kg (193 lb).   Weight management plan: Discussed healthy diet and exercise guidelines    See Patient Instructions    No follow-ups on file.    LALY Ortega  [facility] - ISABEL Abdi is a 51 year old presenting for the following health issues:  No chief complaint on file.      HPI     ED/UC Followup:    Facility:  [facility]  Date of visit: 10/20/22  Reason for visit: elbow pain   Current Status: ongoing elbow pain, has also seen ortho for this        Review of Systems   Constitutional, HEENT, cardiovascular, pulmonary, gi and gu systems are negative, except as otherwise noted.      Objective    /88 (BP Location: Left arm, Patient Position: Sitting, Cuff Size: Adult Regular)   Pulse 94   Temp 97.9  F (36.6  C) (Tympanic)   Resp 12   Ht 1.543 m (5' 0.75\")   Wt 87.5 kg (193 lb)   LMP 03/07/2015 (Exact Date)   SpO2 99%   BMI 36.77 kg/m    Body mass index is 36.77 kg/m .  Physical Exam   GENERAL: healthy, alert and no distress  NECK: no adenopathy, no asymmetry, masses, or scars and thyroid normal to palpation  RESP: lungs clear to auscultation - no rales, rhonchi or " wheezes  CV: regular rate and rhythm, normal S1 S2, no S3 or S4, no murmur, click or rub, no peripheral edema and peripheral pulses strong  MS: extremities normal- no gross deformities noted, can't move her elbow in any rotational fashion and very painful on palpation.   SKIN: no suspicious lesions or rashes    Hospital Outpatient Visit on 10/28/2022   Component Date Value Ref Range Status     Hemoglobin A1C POCT 10/28/2022 7.1 (A)  <5.7 % Final

## 2022-11-14 ENCOUNTER — TELEPHONE (OUTPATIENT)
Dept: FAMILY MEDICINE | Facility: OTHER | Age: 52
End: 2022-11-14

## 2022-11-14 DIAGNOSIS — M77.11 LATERAL EPICONDYLITIS OF RIGHT ELBOW: Primary | ICD-10-CM

## 2022-11-15 ENCOUNTER — HOSPITAL ENCOUNTER (OUTPATIENT)
Dept: OCCUPATIONAL THERAPY | Facility: HOSPITAL | Age: 52
Setting detail: THERAPIES SERIES
Discharge: HOME | End: 2022-11-15
Attending: PHYSICIAN ASSISTANT
Payer: COMMERCIAL

## 2022-11-15 DIAGNOSIS — M77.11 LATERAL EPICONDYLITIS OF RIGHT ELBOW: ICD-10-CM

## 2022-11-15 PROCEDURE — 97033 APP MDLTY 1+IONTPHRSIS EA 15: CPT | Mod: GO

## 2022-11-15 PROCEDURE — 97535 SELF CARE MNGMENT TRAINING: CPT | Mod: GO

## 2022-11-15 PROCEDURE — 97165 OT EVAL LOW COMPLEX 30 MIN: CPT | Mod: GO

## 2022-11-15 NOTE — PROGRESS NOTES
11/15/22 0800   General Information/History   Start Of Care Date 11/15/22   Referring Physician Linn RUFFIN   Orders Evaluate And Treat As Indicated   Orders Date 11/14/22   Medical Diagnosis Lateral epicond   Additional Occupational Profile Info/Pertinent history of current problem Pt reported she started noticing pain in her Rt elbow back in August and it got slightly better and returned. Pt reported she feels pain in lateral and medial elbow and that it radiates into dorsal forearm.   How/Where did it occur With repetition/overuse   Onset date of current episode/exacerbation 08/26/22   Chronicity Chronic   Hand Dominance Left   Affected side Right   Functional limitations perform required work activities;perform activities of daily living   Reported Symptoms Pain;Loss of strength;Tingling;Loss of Motion/Stiffness   Prior level of function Independent ADL;Independent IADL   Assistive devices none   [place]   Patient role/Employment history Employed   Occupation    Employment Status Working in normal job without restrictions   Primary Job Tasks Keyboarding;Using a mouse;Prolonged sitting;Driving   Occupation Comments pt works from home with travel several hours from home 4 times/year   Patient/Family goals statement to decrease pain   General observations Pt presented to evaluation alone. She did not have any visible swelling or bruising.   Fall Risk Screen   Fall screen completed by OT   Have you fallen 2 or more times in the past year? Yes   Have you fallen and had an injury in the past year? No   Is patient a fall risk? No   Abuse Screen (yes response referral indicated)   Feels Unsafe at Home or Work/School no   Feels Threatened by Someone no   Does Anyone Try to Keep You From Having Contact with Others or Doing Things Outside Your Home? no   Physical Signs of Abuse Present no   Pain   Pain Primary Pain Report   Primary Pain Report   Location Rt elbow    Radiation Dorsal Forearm   Pain Quality Burning;Aching   Frequency Constant   Scale 4/10;8/10  (today pain is 3/10)   Pain Is Same All Of The Time   Pain Is Exacerbated By Twisting , Pulling;Using Arm At Shoulder Level;Activity/movement;Gripping;Lifting;Lying On Extremity   Pain Is Relieved By Nsaids,analgesics;Ice;Rest   Progression Since Onset Unchanged   Vitals Signs   Weight 193   Tenderness   Tenderness Medial Elbow;Lateral Elbow   Lateral Elbow   Left None   Right Moderate   Medial Elbow   Left None   Right Moderate   ROM   ROM   (AROM in elbow and forearm is WFL)   Special Tests   Special Tests Assessed   Right Hand Special Tests Comments tinel's cubital tunnel and phalen's did not produce any increased tingling or numbness   Sensation Findings   Sensation Findings   (intact)   Strength   Strength Strength    Avg - Left 62    Avg - Right 37    Elbow Extended Avg - Left 60    Elbow Extended Avg - Right 20  (with increased pain)   Lateral Pinch - Left 18   Lateral Pinch - Right 15   3 Point Pinch - Left 16   3 Point Pinch - Right 8   Education Assessment   Preferred Learning Style Listening;Demonstration   Barriers to Learning No barriers   Therapy Interventions   Planned Therapy Interventions Ultrasound;Iontophoresis (list drug);Strengthening;ROM;Splinting;Education of splint wear, care, fit and precautions;Stretching;Ergonomic Patient Education;Home Program  (dexamethasone)   Clinical Impression   Criteria for Skilled Therapeutic Interventions Met yes   OT Diagnosis decreased ability to manage ADLS/IADLs   Influenced by the following impairments Pain;Decreased strength   Assessment of Occupational Performance 1-3 Performance Deficits   Identified Performance Deficits work (typing) cooking, lifitng, gripping   Clinical Decision Making (Complexity) Low complexity   Therapy Frequency 2x/wk   Predicted Duration of Therapy Intervention (days/wks) 6 weeks   Risks and Benefits of Treatment have  been explained. Yes   Patient, Family & other staff in agreement with plan of care Yes   Clinical Impression Comments Pt has decreased  and pinch strength in Rt hand and c/o constant pain, milli. with twisting movements and extended elbow activities. Pt would benefit from OT services to address concerns. Rehab potential is good.   Hand Eval Goals   Hand Eval Goals 1;2;3   Hand Goal 1   Goal Identifier LTG 1   Goal Description Pt will be able to  a pan to cook a meal   Target Date 12/16/22   Hand Goal 2   Goal Identifier STG 1   Goal Description Pt will be able to follow and tolerate HEP consistently for 2 weeks   Target Date 12/02/22   Hand Goal 3   Goal Identifier LTG 2   Goal Description Pt will have a decrease in pain from 4/10 to 1/10 to allow her to tolerated work tasks without pain.   Target Date 12/23/22   Total Evaluation Time   OT Alondra, Low Complexity Minutes (16953) 30

## 2022-11-18 ENCOUNTER — HOSPITAL ENCOUNTER (OUTPATIENT)
Dept: OCCUPATIONAL THERAPY | Facility: HOSPITAL | Age: 52
Setting detail: THERAPIES SERIES
Discharge: HOME | End: 2022-11-18
Attending: PHYSICIAN ASSISTANT
Payer: COMMERCIAL

## 2022-11-18 PROCEDURE — 97140 MANUAL THERAPY 1/> REGIONS: CPT | Mod: GO

## 2022-11-18 PROCEDURE — 97033 APP MDLTY 1+IONTPHRSIS EA 15: CPT | Mod: GO

## 2022-11-23 ENCOUNTER — HOSPITAL ENCOUNTER (OUTPATIENT)
Dept: OCCUPATIONAL THERAPY | Facility: HOSPITAL | Age: 52
Setting detail: THERAPIES SERIES
Discharge: HOME | End: 2022-11-23
Attending: PHYSICIAN ASSISTANT
Payer: COMMERCIAL

## 2022-11-23 PROCEDURE — 97033 APP MDLTY 1+IONTPHRSIS EA 15: CPT | Mod: GO

## 2022-11-23 PROCEDURE — 97140 MANUAL THERAPY 1/> REGIONS: CPT | Mod: GO

## 2022-11-30 ENCOUNTER — HOSPITAL ENCOUNTER (OUTPATIENT)
Dept: OCCUPATIONAL THERAPY | Facility: HOSPITAL | Age: 52
Setting detail: THERAPIES SERIES
Discharge: HOME | End: 2022-11-30
Attending: PHYSICIAN ASSISTANT
Payer: COMMERCIAL

## 2022-11-30 PROCEDURE — 97140 MANUAL THERAPY 1/> REGIONS: CPT | Mod: GO

## 2022-11-30 PROCEDURE — 97033 APP MDLTY 1+IONTPHRSIS EA 15: CPT | Mod: GO

## 2022-12-08 ENCOUNTER — HOSPITAL ENCOUNTER (OUTPATIENT)
Dept: OCCUPATIONAL THERAPY | Facility: HOSPITAL | Age: 52
Setting detail: THERAPIES SERIES
Discharge: HOME | End: 2022-12-08
Attending: PHYSICIAN ASSISTANT
Payer: COMMERCIAL

## 2022-12-08 PROCEDURE — 97035 APP MDLTY 1+ULTRASOUND EA 15: CPT | Mod: GO

## 2022-12-08 PROCEDURE — 97140 MANUAL THERAPY 1/> REGIONS: CPT | Mod: GO

## 2022-12-13 DIAGNOSIS — I10 BENIGN ESSENTIAL HYPERTENSION: ICD-10-CM

## 2022-12-14 ENCOUNTER — HOSPITAL ENCOUNTER (OUTPATIENT)
Dept: OCCUPATIONAL THERAPY | Facility: HOSPITAL | Age: 52
Setting detail: THERAPIES SERIES
Discharge: HOME | End: 2022-12-14
Attending: PHYSICIAN ASSISTANT
Payer: COMMERCIAL

## 2022-12-14 PROCEDURE — 97035 APP MDLTY 1+ULTRASOUND EA 15: CPT | Mod: GO

## 2022-12-14 PROCEDURE — 97110 THERAPEUTIC EXERCISES: CPT | Mod: GO

## 2022-12-14 RX ORDER — CHLORTHALIDONE 25 MG/1
25 TABLET ORAL DAILY
Qty: 30 TABLET | Refills: 0 | Status: SHIPPED | OUTPATIENT
Start: 2022-12-14 | End: 2022-12-20

## 2022-12-16 DIAGNOSIS — I10 BENIGN ESSENTIAL HYPERTENSION: ICD-10-CM

## 2022-12-16 RX ORDER — METOPROLOL SUCCINATE 100 MG/1
100 TABLET, EXTENDED RELEASE ORAL DAILY
Qty: 90 TABLET | Refills: 1 | Status: SHIPPED | OUTPATIENT
Start: 2022-12-16 | End: 2023-03-06

## 2022-12-16 RX ORDER — METOPROLOL SUCCINATE 50 MG/1
TABLET, EXTENDED RELEASE ORAL
Qty: 90 TABLET | Refills: 1 | Status: SHIPPED | OUTPATIENT
Start: 2022-12-16 | End: 2023-03-06

## 2022-12-16 NOTE — TELEPHONE ENCOUNTER
Metoprolol 50 mg discontinued on 11/9/22 for therapy completed.   Patient reports taking this in the afternoon and takes 100 mg in the morning.   Patient was not aware she was to discontinue the 50 mg and has been taking this.   Patient requesting refill for 50 mg.  Please advise, thank you.

## 2022-12-20 ENCOUNTER — HOSPITAL ENCOUNTER (OUTPATIENT)
Dept: OCCUPATIONAL THERAPY | Facility: HOSPITAL | Age: 52
Setting detail: THERAPIES SERIES
Discharge: HOME | End: 2022-12-20
Attending: PHYSICIAN ASSISTANT
Payer: COMMERCIAL

## 2022-12-20 DIAGNOSIS — I10 BENIGN ESSENTIAL HYPERTENSION: ICD-10-CM

## 2022-12-20 PROCEDURE — 97140 MANUAL THERAPY 1/> REGIONS: CPT | Mod: GO

## 2022-12-20 PROCEDURE — 97035 APP MDLTY 1+ULTRASOUND EA 15: CPT | Mod: GO

## 2022-12-20 RX ORDER — CHLORTHALIDONE 25 MG/1
TABLET ORAL
Qty: 90 TABLET | Refills: 0 | Status: SHIPPED | OUTPATIENT
Start: 2022-12-20 | End: 2023-04-18

## 2022-12-23 DIAGNOSIS — J30.1 SEASONAL ALLERGIC RHINITIS DUE TO POLLEN: ICD-10-CM

## 2022-12-23 RX ORDER — FLUTICASONE PROPIONATE 50 MCG
SPRAY, SUSPENSION (ML) NASAL
Qty: 16 G | Refills: 3 | Status: SHIPPED | OUTPATIENT
Start: 2022-12-23 | End: 2023-06-05

## 2022-12-23 NOTE — TELEPHONE ENCOUNTER
Flonase      Last Written Prescription Date:  09/22/22  Last Fill Quantity: 16g,   # refills: 3  Last Office Visit: 11/09/22  Future Office visit:    Next 5 appointments (look out 90 days)    Jan 09, 2023  4:30 PM  (Arrive by 4:15 PM)  SHORT with LALY Burkett  [facility] [place] ([facility] [place] ) [address]  [place] [address]  [phone]             
Partially impaired: cannot see medication labels or newsprint, but can see obstacles in path, and the surrounding layout; can count fingers at arm's length

## 2023-01-05 ENCOUNTER — HOSPITAL ENCOUNTER (OUTPATIENT)
Dept: OCCUPATIONAL THERAPY | Facility: HOSPITAL | Age: 53
Setting detail: THERAPIES SERIES
Discharge: HOME | End: 2023-01-05
Attending: PHYSICIAN ASSISTANT
Payer: COMMERCIAL

## 2023-01-05 PROCEDURE — 97140 MANUAL THERAPY 1/> REGIONS: CPT | Mod: GO

## 2023-01-05 PROCEDURE — 97035 APP MDLTY 1+ULTRASOUND EA 15: CPT | Mod: GO

## 2023-01-06 NOTE — PROGRESS NOTES
[facility]    Outpatient Occupational Therapy Discharge Note  Patient: Edith Doty  : 1970    Beginning/End Dates of Reporting Period:  11/15/2022 to 2023    Referring Provider: Linn RUFFIN    Therapy Diagnosis: pain in dominant arm    Client Self Report: Pt seen in OT from [id number]. Pt reported she has not really had any pain in elbow but a little soreness over her dorsal forearm. She stated she changed her work station at home and that seems to be helping also. Pt reports she has been doing the stretches and exercises but is concerned the pain will return.    Objective Measurements:     Objective Measure:  strength Rt hand   Details: 54# extended  54# (increased from 37#, extended 20#)               Outcome Measures (most recent score):      Goals:     Goal Identifier LTG 1   Goal Description Pt will be able to  a pan to cook a meal   Target Date 22   Date Met  23   Progress (detail required for progress note):       Goal Identifier STG 1   Goal Description Pt will be able to follow and tolerate HEP consistently for 2 weeks   Target Date 22   Date Met  22   Progress (detail required for progress note):       Goal Identifier LTG 2   Goal Description Pt will have a decrease in pain from 4/10 to 1/10 to allow her to tolerated work tasks without pain.   Target Date 22   Date Met  23   Progress (detail required for progress note):           Plan:  Discharge from therapy.    Discharge:    Reason for Discharge: Patient has met all goals.    Equipment Issued: none    Discharge Plan: Patient to continue home program.

## 2023-01-09 ENCOUNTER — OFFICE VISIT (OUTPATIENT)
Dept: FAMILY MEDICINE | Facility: OTHER | Age: 53
End: 2023-01-09
Attending: PHYSICIAN ASSISTANT
Payer: COMMERCIAL

## 2023-01-09 VITALS
RESPIRATION RATE: 16 BRPM | OXYGEN SATURATION: 98 % | HEART RATE: 79 BPM | TEMPERATURE: 96.8 F | WEIGHT: 193 LBS | BODY MASS INDEX: 36.77 KG/M2 | SYSTOLIC BLOOD PRESSURE: 110 MMHG | DIASTOLIC BLOOD PRESSURE: 70 MMHG

## 2023-01-09 DIAGNOSIS — E03.9 ACQUIRED HYPOTHYROIDISM: ICD-10-CM

## 2023-01-09 DIAGNOSIS — M77.11 LATERAL EPICONDYLITIS OF RIGHT ELBOW: Primary | ICD-10-CM

## 2023-01-09 DIAGNOSIS — E11.9 TYPE 2 DIABETES MELLITUS WITHOUT COMPLICATION, WITHOUT LONG-TERM CURRENT USE OF INSULIN (H): ICD-10-CM

## 2023-01-09 DIAGNOSIS — M77.01 MEDIAL EPICONDYLITIS OF ELBOW, RIGHT: ICD-10-CM

## 2023-01-09 PROCEDURE — 99213 OFFICE O/P EST LOW 20 MIN: CPT | Performed by: PHYSICIAN ASSISTANT

## 2023-01-09 RX ORDER — BLOOD SUGAR DIAGNOSTIC
STRIP MISCELLANEOUS
Qty: 100 STRIP | Refills: 4 | Status: SHIPPED | OUTPATIENT
Start: 2023-01-09 | End: 2023-07-28

## 2023-01-09 ASSESSMENT — PATIENT HEALTH QUESTIONNAIRE - PHQ9: SUM OF ALL RESPONSES TO PHQ QUESTIONS 1-9: 0

## 2023-01-09 ASSESSMENT — ANXIETY QUESTIONNAIRES
GAD7 TOTAL SCORE: 0
1. FEELING NERVOUS, ANXIOUS, OR ON EDGE: NOT AT ALL
GAD7 TOTAL SCORE: 0
3. WORRYING TOO MUCH ABOUT DIFFERENT THINGS: NOT AT ALL
6. BECOMING EASILY ANNOYED OR IRRITABLE: NOT AT ALL
2. NOT BEING ABLE TO STOP OR CONTROL WORRYING: NOT AT ALL
7. FEELING AFRAID AS IF SOMETHING AWFUL MIGHT HAPPEN: NOT AT ALL
5. BEING SO RESTLESS THAT IT IS HARD TO SIT STILL: NOT AT ALL
4. TROUBLE RELAXING: NOT AT ALL

## 2023-01-09 ASSESSMENT — PAIN SCALES - GENERAL: PAINLEVEL: NO PAIN (0)

## 2023-01-09 NOTE — PROGRESS NOTES
Assessment & Plan     Type 2 diabetes mellitus without complication, without long-term current use of insulin (H)  She is due for her refill on strips.   Checking twice a day and also when low.  She is getting great low numbers. Weight is downa nd keeping it down.   - ACCU-CHEK GUIDE test strip; TESTING two TIMES DAILY    Lateral epicondylitis of right elbow  Better but not gone.  Pain in mid forearm now and medial elbow.  Went through 3 months of PT.   - Orthopedic  Referral; Future    Medial epicondylitis of elbow, right  As above. See OA and see if they can given an injection or recommendations if surgical. She is only noting this now if using. But arm is still weak.  Really did not respond to PT like I thought it would.   - Orthopedic  Referral; Future    Review of external notes as documented elsewhere in note  Ordering of each unique test  Prescription drug management  10  minutes spent on the date of the encounter doing chart review, history and exam, documentation and further activities per the note       See Patient Instructions    No follow-ups on file.    LALY Ortega  [facility] - ISABEL Abdi is a 52 year old, presenting for the following health issues:  Follow Up and Musculoskeletal Problem      HPI     Pain History:  When did you first notice your pain? - Chronic Pain   Have you seen this provider for your pain in the past?   Yes   Where in your body do you have pain? elbow  Are you seeing anyone else for your pain? No    PHQ-9 SCORE 9/11/2020 4/13/2022 1/9/2023   PHQ-9 Total Score - - -   PHQ-9 Total Score 0 0 0       RYAN-7 SCORE 9/11/2020 4/13/2022 1/9/2023   Total Score 0 0 0           PHQ-9 SCORE 9/11/2020 4/13/2022 1/9/2023   PHQ-9 Total Score - - -   PHQ-9 Total Score 0 0 0     RYAN-7 SCORE 9/11/2020 4/13/2022 1/9/2023   Total Score 0 0 0     PEG Score 1/9/2023   PEG Total Score 3.33       Chronic Pain Follow Up:    Location of pain: right  elbo  Analgesia/pain control:    - Recent changes:  Shoulder starting to hurt/ pops and pulls on elbow on occasion    - Overall control: Tolerable with discomfort    - Current treatments: exercises and stretches with ice   Adherence:     - Do you ever take more pain medicine than prescribed? No    - When did you take your last dose of pain medicine?  none   Adverse effects: No   PDMP Review     None        Last CSA Agreement:   CSA -- Patient Level:    CSA: None found at the patient level.       Last UDS:         Asthma Follow-Up    Was ACT completed today?    Yes    ACT Total Scores 11/9/2022   ACT TOTAL SCORE (Goal Greater than or Equal to 20) 22   In the past 12 months, how many times did you visit the emergency room for your asthma without being admitted to the hospital? 0   In the past 12 months, how many times were you hospitalized overnight because of your asthma? 0          How many days per week do you miss taking your asthma controller medication?  I do not have an asthma controller medication    Please describe any recent triggers for your asthma: None    Have you had any Emergency Room Visits, Urgent Care Visits, or Hospital Admissions since your last office visit?  No      Review of Systems   CONSTITUTIONAL: NEGATIVE for fever, chills, change in weight  INTEGUMENTARY/SKIN: NEGATIVE for worrisome rashes, moles or lesions  EYES: NEGATIVE for vision changes or irritation  ENT/MOUTH: NEGATIVE for ear, mouth and throat problems  RESP: NEGATIVE for significant cough or SOB  BREAST: NEGATIVE for masses, tenderness or discharge  CV: NEGATIVE for chest pain, palpitations or peripheral edema  GI: NEGATIVE for nausea, abdominal pain, heartburn, or change in bowel habits  : NEGATIVE for frequency, dysuria, or hematuria  MUSCULOSKELETAL:improved from 7 and very limited use to a 2 and can turn but still very weak on use.   NEURO: NEGATIVE for weakness, dizziness or paresthesias  ENDOCRINE: NEGATIVE for temperature  intolerance, skin/hair changes  HEME: NEGATIVE for bleeding problems  PSYCHIATRIC: NEGATIVE for changes in mood or affect      Objective    /70 (BP Location: Right arm, Patient Position: Sitting, Cuff Size: Adult Large)   Pulse 79   Temp 96.8  F (36  C) (Tympanic)   Resp 16   Wt 87.5 kg (193 lb)   LMP 03/07/2015 (Exact Date)   SpO2 98%   BMI 36.77 kg/m    Body mass index is 36.77 kg/m .  Physical Exam   GENERAL: healthy, alert and no distress  EYES: Eyes grossly normal to inspection, PERRL and conjunctivae and sclerae normal  HENT: ear canals and TM's normal, nose and mouth without ulcers or lesions  NECK: no adenopathy, no asymmetry, masses, or scars and thyroid normal to palpation  RESP: lungs clear to auscultation - no rales, rhonchi or wheezes  CV: regular rate and rhythm, normal S1 S2, no S3 or S4, no murmur, click or rub, no peripheral edema and peripheral pulses strong  ABDOMEN: soft, nontender, no hepatosplenomegaly, no masses and bowel sounds normal  MS: no gross musculoskeletal defects noted, no edema  SKIN: no suspicious lesions or rashes  NEURO: Normal strength and tone, mentation intact and speech normal    Hospital Outpatient Visit on 10/28/2022   Component Date Value Ref Range Status     Hemoglobin A1C POCT 10/28/2022 7.1 (A)  <5.7 % Final

## 2023-01-25 ENCOUNTER — TRANSFERRED RECORDS (OUTPATIENT)
Dept: HEALTH INFORMATION MANAGEMENT | Facility: CLINIC | Age: 53
End: 2023-01-25

## 2023-01-30 ENCOUNTER — HOSPITAL ENCOUNTER (OUTPATIENT)
Dept: EDUCATION SERVICES | Facility: HOSPITAL | Age: 53
Discharge: HOME | End: 2023-01-30
Attending: NURSE PRACTITIONER | Admitting: PHYSICIAN ASSISTANT
Payer: COMMERCIAL

## 2023-01-30 VITALS
DIASTOLIC BLOOD PRESSURE: 74 MMHG | HEART RATE: 81 BPM | RESPIRATION RATE: 18 BRPM | HEIGHT: 61 IN | OXYGEN SATURATION: 100 % | WEIGHT: 189.2 LBS | BODY MASS INDEX: 35.72 KG/M2 | SYSTOLIC BLOOD PRESSURE: 116 MMHG

## 2023-01-30 DIAGNOSIS — E11.9 TYPE 2 DIABETES MELLITUS WITHOUT COMPLICATION, WITHOUT LONG-TERM CURRENT USE OF INSULIN (H): ICD-10-CM

## 2023-01-30 PROCEDURE — G0108 DIAB MANAGE TRN  PER INDIV: HCPCS

## 2023-01-30 ASSESSMENT — PAIN SCALES - GENERAL: PAINLEVEL: NO PAIN (0)

## 2023-01-30 NOTE — PATIENT INSTRUCTIONS
Recap of our visit:     Monitoring:  A1C review:  Your A1C was 7.1 on 10/28/2023. Goal is less than 7%  Next A1C: Plan to do with labs on 3/3/23.    Check blood sugars once x/day. Fasting in the morning or 2 hours after your largest meal are good times to check.  Target blood sugar: Fasting or before meals target is . 2 hours after meal <180.     Medications:   Continue:Metformin 1000 mg twice daily, refill sent today. Trulicity 1.5 mg weekly.     Healthy Eating:  Limit higher carbohydrate foods such as: rice, breads, cereal, pasta, sweets. Avoid sugary drinks.  Good snack examples: meat, cheese, cottage cheese, nuts, hard boiled egg, veggies, fruit/berries, yogurt (Greek yogurt/protein).     Activity/Exercise:   Increase exercise as tolerated, even multiple short times during your day helps.   Adult goal is 150 minutes/week =  30 minutes 5 days of the week.   Weight: 189#, trending down.     Foot exam: due- yearly.   Eye exam: due in February.     Great work! Congratulations!    Follow up: 4/24/2023 at 3 pm for BG review.   Please bring your meter/reader to your appointment.     If you have any questions or concerns, please call me at [phone] or send [employer] message.    Thank you for coming in today!  Reshma Sherman, RN Diabetes Educator

## 2023-01-30 NOTE — PROGRESS NOTES
"Diabetes Self-Management Education & Support    Presents for: Individual review    Type of Service: In Person Visit    Assessment Type:   ASSESSMENT:  [place] comes to [facility] for BG review. Due for A1C, pt defers, ordered with labs in March for PCP follow up.   BG 2 week report: ave 134. Lowest 105- highest 172. BG trending down from previous visit comparison. Tolerating Current Metformin 1000 mg twice daily, refill sent to pharmacy. Trulicity 1.5 mg once weekly. Has refills available. Feels better on this dose, BG trending down. Reviewed labs on file.   No medication changes recommended today.     Activity- time and weather are barriers. Work and sub-zero temps with windchill.   /74. No Statin. No ASA. No tobacco use. Foot exam due, patient aware- pt reporting new numbness left foot middle of ball of foot, \"small spot\" feels different. Eye exam due in February, plans to call/schedule this week for February.    Wt Readings from Last 10 Encounters:   01/30/23 85.8 kg (189 lb 3.2 oz)   01/09/23 87.5 kg (193 lb)   11/09/22 87.5 kg (193 lb)   10/28/22 87.9 kg (193 lb 11.2 oz)   10/20/22 87 kg (191 lb 12.8 oz)   08/26/22 87.5 kg (192 lb 14.4 oz)   05/10/22 90.3 kg (199 lb 1.6 oz)   04/28/22 90.3 kg (199 lb)   04/13/22 91.6 kg (202 lb)   09/24/21 85.3 kg (188 lb)       Patient's most recent   Lab Results   Component Value Date    A1C 6.6 04/13/2022    A1C 7.1 08/27/2020    HEMOGLOBINA1 7.1 10/28/2022     is not meeting goal of <7.0- due for A1C, plans to check with labs scheduled 3/3/23.     Diabetes knowledge and skills assessment:   Patient is knowledgeable in diabetes management concepts related to: Healthy Eating, Monitoring and Taking Medication  Continue education with the following diabetes management concepts: Healthy Eating, Being Active, Monitoring, Taking Medication, Problem Solving, Reducing Risks and Healthy Coping  Based on learning assessment above, most appropriate setting for further diabetes education " would be: Individual setting.      PLAN    Monitoring:  A1C was 7.1 on 10/28/2023. Goal is less than 7%  Next A1C: Plan to do with labs on 3/3/23.     Check blood sugars once x/day. Fasting in the morning or 2 hours after your largest meal are good times to check.  Target blood sugar: Fasting or before meals target is . 2 hours after meal <180.      Medications:   Continue:Metformin 1000 mg twice daily, refill sent today. Trulicity 1.5 mg weekly.      Healthy Eating:  Limit higher carbohydrate foods such as: rice, breads, cereal, pasta, sweets. Avoid sugary drinks.  Good snack examples: meat, cheese, cottage cheese, nuts, hard boiled egg, veggies, fruit/berries, yogurt (Greek yogurt/protein).      Activity/Exercise:   Increase exercise as tolerated, even multiple short times during your day helps.   Adult goal is 150 minutes/week =  30 minutes 5 days of the week.   Weight: 189#, trending down.      Foot exam: due- yearly.   Eye exam: due in February.      Follow up: 4/24/2023 at 3 pm for BG review.   Please bring your meter/reader to your appointment.      If you have any questions or concerns, please call me at [phone] or send [employer] message.  Topics to cover at upcoming visits: Healthy Eating, Being Active, Monitoring, Taking Medication, Problem Solving, Reducing Risks and Healthy Coping  See Care Plan for co-developed, patient-state behavior change goals.  AVS provided for patient today.    Education Materials Provided:  No new materials provided today      SUBJECTIVE/OBJECTIVE:  Presents for: Individual review  Accompanied by: Self  Diabetes education in the past 24mo: Yes  Focus of Visit: Monitoring, Taking Medication  Diabetes type: Type 2  Date of diagnosis: August 2017  Disease course: Improving  How confident are you filling out medical forms by yourself:: Extremely  Diabetes management related comments/concerns: none  Transportation concerns: No  Difficulty affording diabetes medication?:  "No  Difficulty affording diabetes testing supplies?: No  Other concerns:: None  Cultural Influences/Ethnic Background:  Choose not to answer    Diabetes Symptoms & Complications:  Fatigue: No  Neuropathy: Yes (noticing a \"little bit\" of numbness on left foot, middle of ball of foot.)  Polydipsia: No  Polyphagia: No  Polyuria: No  Visual change: No (due for exam February.)  Slow healing wounds: No  Symptom course: Worsening (new sx-neuropathy.)  Weight trend: Decreasing  Complications assessed today?: Yes  Autonomic neuropathy: No  CVA: No  Heart disease: No  Nephropathy: No  Peripheral neuropathy: No  Foot ulcerations: No  Peripheral Vascular Disease: No  Retinopathy: No  Sexual dysfunction: No    Patient Problem List and Family Medical History reviewed for relevant medical history, current medical status, and diabetes risk factors.    Vitals:  /74   Pulse 81   Resp 18   Ht 1.543 m (5' 0.75\")   Wt 85.8 kg (189 lb 3.2 oz)   LMP 03/07/2015 (Exact Date)   SpO2 100%   BMI 36.04 kg/m    Estimated body mass index is 36.04 kg/m  as calculated from the following:    Height as of this encounter: 1.543 m (5' 0.75\").    Weight as of this encounter: 85.8 kg (189 lb 3.2 oz).   Last 3 BP:   BP Readings from Last 3 Encounters:   01/30/23 116/74   01/09/23 110/70   11/09/22 136/88       History   Smoking Status     Never   Smokeless Tobacco     Never       Labs:  Lab Results   Component Value Date    A1C 6.6 04/13/2022    A1C 7.1 08/27/2020    HEMOGLOBINA1 7.1 10/28/2022     Lab Results   Component Value Date     04/13/2022     06/25/2018     Lab Results   Component Value Date    LDL 69 09/23/2021    LDL 78 04/08/2019     HDL Cholesterol   Date Value Ref Range Status   04/08/2019 73 >49 mg/dL Final     Direct Measure HDL   Date Value Ref Range Status   09/23/2021 53 >=50 mg/dL Final   ]  GFR Estimate   Date Value Ref Range Status   04/13/2022 >90 >60 mL/min/1.73m2 Final     Comment:     Effective December " 21, 2021 eGFRcr in adults is calculated using the 2021 CKD-EPI creatinine equation which includes age and gender (Nathen roach al., NEJ, DOI: 10.1056/SRXYun0044230)   06/03/2019 >90 >60 mL/min/[1.73_m2] Final     GFR Estimate If Black   Date Value Ref Range Status   06/03/2019 >90 >60 mL/min/[1.73_m2] Final     Lab Results   Component Value Date    CR 0.68 04/13/2022    CR 0.65 06/25/2018     No results found for: MICROALBUMIN    Healthy Eating:  Healthy Eating Assessed Today: Yes  Cultural/Religion diet restrictions?: No  Meal planning/habits: Carb counting, Smaller portions, Other (Trulicity- decreased apetitie.)  How many times a week on average do you eat food made away from home (restaurant/take-out)?: 0 (once in last 3 weeks.)  Meals include: Breakfast, Dinner, Afternoon Snack  Breakfast: cereal and maybe toast.  Lunch: depends on work schedule. left overs.milk and banana  Dinner: chicken and rice, vegetable. frozen pizza.  Snacks: banana or apple. or pretzels, handful.  Beverages: Water, Milk, Juice  Has patient met with a dietitian in the past?: No    Being Active:  Being Active Assessed Today: No  Exercise:: Yes (trying to walk more.)  Days per week of moderate to strenuous exercise (like a brisk walk): 1  On average, minutes per day of exercise at this level: 20  How intense was your typical exercise? : Moderate (like brisk walking)  Exercise Minutes per Week: 20  Barrier to exercise: Time (weather, winter)    Monitoring:  Monitoring Assessed Today: Yes  Did patient bring glucose meter to appointment? : Yes  Blood Glucose Meter: Accu-chek  Times checking blood sugar at home (number): 1 (checks fasting. forgets post meal- schedules vary.)  Times checking blood sugar at home (per): Day  Blood glucose trend: Decreasing (2 week BG report ave 134. lowest 105 -highest 172.)    Taking Medications:  Diabetes Medication(s)     Biguanides       metFORMIN (GLUCOPHAGE) 1000 MG tablet    Take 1 tablet (1,000 mg) by mouth  2 times daily (with meals)    Incretin Mimetic Agents       TRULICITY 1.5 MG/0.5ML pen    ADMINISTER 1.5 MG UNDER THE SKIN EVERY 7 DAYS          Taking Medication Assessed Today: Yes  Current Treatments: Oral Medication (taken by mouth), Non-insulin Injectables (Trulicity 1.5 mg once weekly (Mondays) Metformin 1000 mg twice daily.)  Problems taking diabetes medications regularly?: No  Diabetes medication side effects?: No    Problem Solving:  Problem Solving Assessed Today: Yes  Is the patient at risk for hypoglycemia?: No     Reducing Risks:  Reducing Risks Assessed Today: Yes  Diabetes Risks: Age over 45 years, Hypertriglyceridemia, Sedentary Lifestyle  CAD Risks: Diabetes Mellitus, Family history, Hypertension, Obesity, Sedentary lifestyle, Stress, Dyslipidemia  Has dilated eye exam at least once a year?: Yes (February 2022)  Sees dentist every 6 months?: Yes  Feet checked by healthcare provider in the last year?: No (due, has pcp in November.)    Healthy Coping:  Healthy Coping Assessed Today: Yes  Emotional response to diabetes: Acceptance  Informal Support system:: Children, Family, Parent, Spouse  Stage of change: ACTION (Actively working towards change)  Patient Activation Measure Survey Score:  JAIME Score (Last Two) 12/19/2018 11/9/2022   JAIME Raw Score 40 33   Activation Score 100 65.8   JAIME Level 4 3       Care Plan and Education Provided:  Care Plan: Diabetes   Updates made by Reshma Sherman RN since 1/30/2023 12:00 AM      Problem: HbA1C Not In Goal       Goal: Establish Regular Follow-Ups with PCP       Task: Discuss with PCP the recommended timing for patient's next follow up visit(s)    Responsible User: Reshma Sherman RN      Task: Discuss schedule for PCP visits with patient    Responsible User: Reshma Sherman RN      Goal: Get HbA1C Level in Goal       Task: Educate patient on diabetes education self-management topics    Responsible User: Reshma Sherman RN      Task: Educate patient on benefits of  regular glucose monitoring    Responsible User: Reshma Sherman RN      Task: Refer patient to appropriate extended care team member, as needed (Medication Therapy Management, Behavioral Health, Physical Therapy, etc.)    Responsible User: Reshma Sherman RN      Task: Discuss diabetes treatment plan with patient    Responsible User: Reshma Sherman RN      Problem: Diabetes Self-Management Education Needed to Optimize Self-Care Behaviors       Goal: Understand diabetes pathophysiology and disease progression    This Visit's Progress: 100%      Task: Provide education on diabetes pathophysiology and disease progression specfic to patient's diabetes type Completed 1/30/2023   Responsible User: Reshma Sherman RN      Goal: Healthy Eating - follow a healthy eating pattern for diabetes    This Visit's Progress: 100%      Task: Provide education on portion control and consistency in amount, composition and timing of food intake    Responsible User: Reshma Sherman RN      Task: Provide education on managing carbohydrate intake (carbohydrate counting, plate planning method, etc.)    Responsible User: Reshma Sherman RN      Task: Provide education on weight management    Responsible User: Reshma Sherman RN      Task: Provide education on heart healthy eating    Responsible User: Reshma Sherman RN      Task: Provide education on eating out Completed 1/30/2023   Responsible User: Reshma Sherman RN      Task: Develop individualized healthy eating plan with patient    Responsible User: Reshma Sherman RN      Goal: Being Active - get regular physical activity, working up to at least 150 minutes per week    This Visit's Progress: On track      Task: Provide education on relationship of activity to glucose and precautions to take if at risk for low glucose    Responsible User: Reshma Sherman RN      Task: Discuss barriers to physical activity with patient Completed 1/30/2023   Responsible User: Reshma Sherman RN      Task: Develop  physical activity plan with patient    Responsible User: Reshma Sherman RN      Task: Explore community resources including walking groups, assistance programs, and home videos    Responsible User: Reshma Sherman RN      Goal: Monitoring - monitor glucose and ketones as directed    This Visit's Progress: 100%   Note:    Tests daily. Mostly fasting, trying to remember to check post meals on occasion.      Task: Provide education on blood glucose monitoring (purpose, proper technique, frequency, glucose targets, interpreting results, when to use glucose control solution, sharps disposal)    Responsible User: Reshma Sherman RN      Task: Provide education on continuous glucose monitoring (sensor placement, use of saturnino or /reader, understanding glucose trends, alerts and alarms, differences between sensor glucose and blood glucose)    Responsible User: Reshma Sherman RN      Task: Provide education on ketone monitoring (when to monitor, frequency, etc.)    Responsible User: Reshma Sherman RN      Goal: Taking Medication - patient is consistently taking medications as directed    This Visit's Progress: 100%   Note:    Takes medication as prescribed. No missed doses.   Metformin  Trulicity    Tolerating, no side effects on current plan.     Task: Provide education on action of prescribed medication, including when to take and possible side effects Completed 1/30/2023   Responsible User: Reshma Sherman RN      Task: Provide education on insulin and injectable diabetes medications, including administration, storage, site selection and rotation for injection sites    Responsible User: Reshma Sherman RN      Task: Discuss barriers to medication adherence with patient and provide management technique ideas as appropriate    Responsible User: Reshma Sherman RN      Task: Provide education on frequency and refill details of medications Completed 1/30/2023   Responsible User: Reshma Sherman RN      Goal: Problem Solving -  know how to prevent and manage short-term diabetes complications    This Visit's Progress: 100%      Task: Provide education on high blood glucose - causes, signs/symptoms, prevention and treatment Completed 1/30/2023   Responsible User: Reshma Sherman RN      Task: Provide education on low blood glucose - causes, signs/symptoms, prevention, treatment, carrying a carbohydrate source at all times, and medical identification    Responsible User: Reshma Sherman RN      Task: Provide education on safe travel with diabetes    Responsible User: Reshma Sherman RN      Task: Provide education on how to care for diabetes on sick days    Responsible User: Reshma Sherman RN      Task: Provide education on when to call a health care provider Completed 1/30/2023   Responsible User: Reshma Sherman RN      Goal: Reducing Risks - know how to prevent and treat long-term diabetes complications    This Visit's Progress: 80%   Note:    Eye exam due February, call to schedule appt  Foot exam due- appt with PCP in March.   No Tobacco use.       Task: Provide education on major complications of diabetes, prevention, early diagnostic measures and treatment of complications Completed 1/30/2023   Responsible User: Reshma Sherman RN      Task: Provide education on recommended care for dental, eye and foot health    Responsible User: Reshma Sherman RN      Task: Provide education on Hemoglobin A1c - goals and relationship to blood glucose levels Completed 1/30/2023   Responsible User: Reshma Sherman RN      Task: Provide education on recommendations for heart health - lipid levels and goals, blood pressure and goals, and aspirin therapy, if indicated    Responsible User: Reshma Sherman RN      Task: Provide education on tobacco cessation    Responsible User: Reshma Sherman RN      Goal: Healthy Coping - use available resources to cope with the challenges of managing diabetes    This Visit's Progress: On track      Task: Discuss recognizing  feelings about having diabetes Completed 1/30/2023   Responsible User: Reshma Sherman RN      Task: Provide education on the benefits of making appropriate lifestyle changes Completed 1/30/2023   Responsible User: Reshma Sherman RN      Task: Provide education on benefits of utilizing support systems Completed 1/30/2023   Responsible User: Reshma Sherman RN      Task: Discuss methods for coping with stress    Responsible User: Reshma Sherman RN      Task: Provide education on when to seek professional counseling    Responsible User: Reshma Sherman RN          Time Spent: 30 minutes  Encounter Type: Individual    Any diabetes medication dose changes were made via the CDE Protocol per the patient's primary care provider. A copy of this encounter was shared with the provider.  Resmha Sherman RN Diabetes Educator,  [phone]  1/30/2023 at 3:50 PM

## 2023-02-03 DIAGNOSIS — E11.9 TYPE 2 DIABETES MELLITUS WITHOUT COMPLICATION, WITHOUT LONG-TERM CURRENT USE OF INSULIN (H): ICD-10-CM

## 2023-03-03 ENCOUNTER — LAB (OUTPATIENT)
Dept: LAB | Facility: OTHER | Age: 53
End: 2023-03-03
Payer: COMMERCIAL

## 2023-03-03 DIAGNOSIS — E11.9 TYPE 2 DIABETES MELLITUS WITHOUT COMPLICATION, WITHOUT LONG-TERM CURRENT USE OF INSULIN (H): ICD-10-CM

## 2023-03-03 DIAGNOSIS — E03.9 ACQUIRED HYPOTHYROIDISM: ICD-10-CM

## 2023-03-03 LAB
ALBUMIN SERPL BCG-MCNC: 4.2 G/DL (ref 3.5–5.2)
ALP SERPL-CCNC: 54 U/L (ref 35–104)
ALT SERPL W P-5'-P-CCNC: 42 U/L (ref 10–35)
ANION GAP SERPL CALCULATED.3IONS-SCNC: 14 MMOL/L (ref 7–15)
AST SERPL W P-5'-P-CCNC: 27 U/L (ref 10–35)
BASOPHILS # BLD AUTO: 0.1 10E3/UL (ref 0–0.2)
BASOPHILS NFR BLD AUTO: 1 %
BILIRUB SERPL-MCNC: 1.3 MG/DL
BUN SERPL-MCNC: 28.7 MG/DL (ref 6–20)
CALCIUM SERPL-MCNC: 10.5 MG/DL (ref 8.6–10)
CHLORIDE SERPL-SCNC: 99 MMOL/L (ref 98–107)
CHOLEST SERPL-MCNC: 176 MG/DL
CREAT SERPL-MCNC: 1.09 MG/DL (ref 0.51–0.95)
DEPRECATED HCO3 PLAS-SCNC: 27 MMOL/L (ref 22–29)
EOSINOPHIL # BLD AUTO: 0.3 10E3/UL (ref 0–0.7)
EOSINOPHIL NFR BLD AUTO: 4 %
ERYTHROCYTE [DISTWIDTH] IN BLOOD BY AUTOMATED COUNT: 11.8 % (ref 10–15)
EST. AVERAGE GLUCOSE BLD GHB EST-MCNC: 157 MG/DL
GFR SERPL CREATININE-BSD FRML MDRD: 61 ML/MIN/1.73M2
GLUCOSE SERPL-MCNC: 144 MG/DL (ref 70–99)
HBA1C MFR BLD: 7.1 %
HCT VFR BLD AUTO: 36.9 % (ref 35–47)
HDLC SERPL-MCNC: 54 MG/DL
HGB BLD-MCNC: 13.4 G/DL (ref 11.7–15.7)
IMM GRANULOCYTES # BLD: 0 10E3/UL
IMM GRANULOCYTES NFR BLD: 0 %
LDLC SERPL CALC-MCNC: 52 MG/DL
LYMPHOCYTES # BLD AUTO: 2.9 10E3/UL (ref 0.8–5.3)
LYMPHOCYTES NFR BLD AUTO: 35 %
MCH RBC QN AUTO: 32.6 PG (ref 26.5–33)
MCHC RBC AUTO-ENTMCNC: 36.3 G/DL (ref 31.5–36.5)
MCV RBC AUTO: 90 FL (ref 78–100)
MONOCYTES # BLD AUTO: 0.7 10E3/UL (ref 0–1.3)
MONOCYTES NFR BLD AUTO: 8 %
NEUTROPHILS # BLD AUTO: 4.4 10E3/UL (ref 1.6–8.3)
NEUTROPHILS NFR BLD AUTO: 52 %
NONHDLC SERPL-MCNC: 122 MG/DL
NRBC # BLD AUTO: 0 10E3/UL
NRBC BLD AUTO-RTO: 0 /100
PLATELET # BLD AUTO: 336 10E3/UL (ref 150–450)
POTASSIUM SERPL-SCNC: 3.2 MMOL/L (ref 3.4–5.3)
PROT SERPL-MCNC: 6.8 G/DL (ref 6.4–8.3)
RBC # BLD AUTO: 4.11 10E6/UL (ref 3.8–5.2)
SODIUM SERPL-SCNC: 140 MMOL/L (ref 136–145)
TRIGL SERPL-MCNC: 348 MG/DL
TSH SERPL DL<=0.005 MIU/L-ACNC: 0.5 UIU/ML (ref 0.3–4.2)
WBC # BLD AUTO: 8.3 10E3/UL (ref 4–11)

## 2023-03-03 PROCEDURE — 83036 HEMOGLOBIN GLYCOSYLATED A1C: CPT

## 2023-03-03 PROCEDURE — 80061 LIPID PANEL: CPT

## 2023-03-03 PROCEDURE — 36415 COLL VENOUS BLD VENIPUNCTURE: CPT

## 2023-03-03 PROCEDURE — 80050 GENERAL HEALTH PANEL: CPT

## 2023-03-03 NOTE — PROGRESS NOTES
Assessment & Plan     HTN (hypertension)  She has resistant hypertension.  Finally we have her controll on multiple agents.  Se feels well,  Good lifestyle changes, watching diet down 20# and seems to be more active. Walking on a treadtmill and feels like she is making a lot of positive changes in her diet and exercise program.    - metoprolol succinate ER (TOPROL XL) 100 MG 24 hr tablet; Take 1 tablet (100 mg) by mouth daily In morning  - metoprolol succinate ER (TOPROL XL) 50 MG 24 hr tablet; Take 1 tablet (50 mg) by mouth daily In afternoon.  - amLODIPine (NORVASC) 10 MG tablet; Take 1 tablet (10 mg) by mouth daily    Type 2 diabetes mellitus with hyperglycemia, without long-term current use of insulin (H)  A1c is 7.1 on Trulicity. We tried to increase but this gave her too many sie effects.  She feels quiet well. She is very happy with her control checking her sugars twice a day.  Needing eye exam.  And is taking 81 mg  Daily.  She feels well.   - dulaglutide (TRULICITY) 1.5 MG/0.5ML pen; Inject 1.5 mg Subcutaneous every 7 days    Review of external notes as documented elsewhere in note  Ordering of each unique test  Prescription drug management  10  minutes spent on the date of the encounter doing chart review, history and exam, documentation and further activities per the note       See Patient Instructions    No follow-ups on file.    LALY Ortega  [facility] - ISABEL Abdi is a 52 year old, presenting for the following health issues:  Diabetes and Lipids      HPI     Diabetes Follow-up    How often are you checking your blood sugar? Two times daily  Blood sugar testing frequency justification:  checking blood glucose levels   What time of day are you checking your blood sugars (select all that apply)?  morning fasting and at night  Have you had any blood sugars above 200?  No  Have you had any blood sugars below 70?  No    What symptoms do you notice when your blood  sugar is low?  Lethargy and Other: nausea and upset stomach    What concerns do you have today about your diabetes? None     Do you have any of these symptoms? (Select all that apply)  No numbness or tingling in feet.  No redness, sores or blisters on feet.  No complaints of excessive thirst.  No reports of blurry vision.  No significant changes to weight.    Have you had a diabetic eye exam in the last 12 months? No        BP Readings from Last 2 Encounters:   03/06/23 110/70   01/30/23 116/74     Hemoglobin A1C (%)   Date Value   03/03/2023 7.1 (H)   04/13/2022 6.6 (H)   08/27/2020 7.1 (A)   08/07/2019 6.5 (A)     LDL Cholesterol Calculated (mg/dL)   Date Value   03/03/2023 52   09/23/2021 69   04/08/2019 78   08/15/2017 63         Hyperlipidemia Follow-Up      Are you regularly taking any medication or supplement to lower your cholesterol?   No    Are you having muscle aches or other side effects that you think could be caused by your cholesterol lowering medication?  No      Hypertension Follow-up      Do you check your blood pressure regularly outside of the clinic? No     Are you following a low salt diet? Yes    Are your blood pressures ever more than 140 on the top number (systolic) OR more   than 90 on the bottom number (diastolic), for example 140/90? No            Review of Systems   CONSTITUTIONAL:NEGATIVE for fever, chills, change in weight  INTEGUMENTARY/SKIN: NEGATIVE for worrisome rashes, moles or lesions  EYES: NEGATIVE for vision changes or irritation  ENT/MOUTH: NEGATIVE for ear, mouth and throat problems  RESP:NEGATIVE for significant cough or SOB  CV: NEGATIVE for chest pain, palpitations or peripheral edema  GI: NEGATIVE for nausea, abdominal pain, heartburn, stools are lower than usual.  That has been since the medication   MUSCULOSKELETAL: NEGATIVE for significant arthralgias or myalgia  NEURO: NEGATIVE for weakness, dizziness or paresthesias  PSYCHIATRIC: NEGATIVE for changes in mood or  affect      Objective    /70 (BP Location: Right arm, Patient Position: Sitting, Cuff Size: Adult Large)   Pulse 77   Temp 97.6  F (36.4  C) (Tympanic)   Resp 16   Wt 83 kg (183 lb)   LMP 03/07/2015 (Exact Date)   SpO2 100%   BMI 34.86 kg/m    Body mass index is 34.86 kg/m .  Physical Exam   GENERAL: healthy, alert and no distress  HENT: ear canals and TM's normal, nose and mouth without ulcers or lesions  NECK: no adenopathy, no asymmetry, masses, or scars and thyroid normal to palpation  RESP: lungs clear to auscultation - no rales, rhonchi or wheezes  CV: regular rate and rhythm, normal S1 S2, no S3 or S4, no murmur, click or rub, no peripheral edema and peripheral pulses strong  ABDOMEN: soft, nontender, no hepatosplenomegaly, no masses and bowel sounds normal  MS: no gross musculoskeletal defects noted, no edema  SKIN: no suspicious lesions or rashes  NEURO: Normal strength and tone, mentation intact and speech normal  BACK: no CVA tenderness, no paralumbar tenderness  PSYCH: mentation appears normal, affect normal/bright  LYMPH: no cervical, supraclavicular, axillary, or inguinal adenopathy    Lab on 03/03/2023   Component Date Value Ref Range Status     TSH 03/03/2023 0.50  0.30 - 4.20 uIU/mL Final     Sodium 03/03/2023 140  136 - 145 mmol/L Final     Potassium 03/03/2023 3.2 (L)  3.4 - 5.3 mmol/L Final     Chloride 03/03/2023 99  98 - 107 mmol/L Final     Carbon Dioxide (CO2) 03/03/2023 27  22 - 29 mmol/L Final     Anion Gap 03/03/2023 14  7 - 15 mmol/L Final     Urea Nitrogen 03/03/2023 28.7 (H)  6.0 - 20.0 mg/dL Final     Creatinine 03/03/2023 1.09 (H)  0.51 - 0.95 mg/dL Final     Calcium 03/03/2023 10.5 (H)  8.6 - 10.0 mg/dL Final     Glucose 03/03/2023 144 (H)  70 - 99 mg/dL Final     Alkaline Phosphatase 03/03/2023 54  35 - 104 U/L Final     AST 03/03/2023 27  10 - 35 U/L Final     ALT 03/03/2023 42 (H)  10 - 35 U/L Final     Protein Total 03/03/2023 6.8  6.4 - 8.3 g/dL Final     Albumin  03/03/2023 4.2  3.5 - 5.2 g/dL Final     Bilirubin Total 03/03/2023 1.3 (H)  <=1.2 mg/dL Final     GFR Estimate 03/03/2023 61  >60 mL/min/1.73m2 Final    eGFR calculated using 2021 CKD-EPI equation.     Estimated Average Glucose 03/03/2023 157  mg/dL Final     Hemoglobin A1C 03/03/2023 7.1 (H)  <5.7 % Final    Normal <5.7%   Prediabetes 5.7-6.4%    Diabetes 6.5% or higher     Note: Adopted from ADA consensus guidelines.     Cholesterol 03/03/2023 176  <200 mg/dL Final     Triglycerides 03/03/2023 348 (H)  <150 mg/dL Final     Direct Measure HDL 03/03/2023 54  >=50 mg/dL Final     LDL Cholesterol Calculated 03/03/2023 52  <=100 mg/dL Final     Non HDL Cholesterol 03/03/2023 122  <130 mg/dL Final     WBC Count 03/03/2023 8.3  4.0 - 11.0 10e3/uL Final     RBC Count 03/03/2023 4.11  3.80 - 5.20 10e6/uL Final     Hemoglobin 03/03/2023 13.4  11.7 - 15.7 g/dL Final     Hematocrit 03/03/2023 36.9  35.0 - 47.0 % Final     MCV 03/03/2023 90  78 - 100 fL Final     MCH 03/03/2023 32.6  26.5 - 33.0 pg Final     MCHC 03/03/2023 36.3  31.5 - 36.5 g/dL Final     RDW 03/03/2023 11.8  10.0 - 15.0 % Final     Platelet Count 03/03/2023 336  150 - 450 10e3/uL Final     % Neutrophils 03/03/2023 52  % Final     % Lymphocytes 03/03/2023 35  % Final     % Monocytes 03/03/2023 8  % Final     % Eosinophils 03/03/2023 4  % Final     % Basophils 03/03/2023 1  % Final     % Immature Granulocytes 03/03/2023 0  % Final     NRBCs per 100 WBC 03/03/2023 0  <1 /100 Final     Absolute Neutrophils 03/03/2023 4.4  1.6 - 8.3 10e3/uL Final     Absolute Lymphocytes 03/03/2023 2.9  0.8 - 5.3 10e3/uL Final     Absolute Monocytes 03/03/2023 0.7  0.0 - 1.3 10e3/uL Final     Absolute Eosinophils 03/03/2023 0.3  0.0 - 0.7 10e3/uL Final     Absolute Basophils 03/03/2023 0.1  0.0 - 0.2 10e3/uL Final     Absolute Immature Granulocytes 03/03/2023 0.0  <=0.4 10e3/uL Final     Absolute NRBCs 03/03/2023 0.0  10e3/uL Final

## 2023-03-06 ENCOUNTER — OFFICE VISIT (OUTPATIENT)
Dept: FAMILY MEDICINE | Facility: OTHER | Age: 53
End: 2023-03-06
Attending: PHYSICIAN ASSISTANT
Payer: COMMERCIAL

## 2023-03-06 VITALS
TEMPERATURE: 97.6 F | OXYGEN SATURATION: 100 % | WEIGHT: 183 LBS | RESPIRATION RATE: 16 BRPM | DIASTOLIC BLOOD PRESSURE: 70 MMHG | HEART RATE: 77 BPM | SYSTOLIC BLOOD PRESSURE: 110 MMHG | BODY MASS INDEX: 34.86 KG/M2

## 2023-03-06 DIAGNOSIS — I10 BENIGN ESSENTIAL HYPERTENSION: ICD-10-CM

## 2023-03-06 DIAGNOSIS — E11.65 TYPE 2 DIABETES MELLITUS WITH HYPERGLYCEMIA, WITHOUT LONG-TERM CURRENT USE OF INSULIN (H): ICD-10-CM

## 2023-03-06 PROCEDURE — 99214 OFFICE O/P EST MOD 30 MIN: CPT | Performed by: PHYSICIAN ASSISTANT

## 2023-03-06 RX ORDER — METOPROLOL SUCCINATE 50 MG/1
TABLET, EXTENDED RELEASE ORAL
Qty: 90 TABLET | Refills: 1 | Status: SHIPPED | OUTPATIENT
Start: 2023-03-06 | End: 2023-09-01

## 2023-03-06 RX ORDER — DULAGLUTIDE 1.5 MG/.5ML
1.5 INJECTION, SOLUTION SUBCUTANEOUS
Qty: 2 ML | Refills: 3 | Status: SHIPPED | OUTPATIENT
Start: 2023-03-06 | End: 2023-04-02

## 2023-03-06 RX ORDER — AMLODIPINE BESYLATE 10 MG/1
10 TABLET ORAL DAILY
Qty: 90 TABLET | Refills: 2 | Status: SHIPPED | OUTPATIENT
Start: 2023-03-06 | End: 2023-12-13

## 2023-03-06 RX ORDER — METOPROLOL SUCCINATE 100 MG/1
100 TABLET, EXTENDED RELEASE ORAL DAILY
Qty: 90 TABLET | Refills: 1 | Status: SHIPPED | OUTPATIENT
Start: 2023-03-06 | End: 2023-09-01

## 2023-03-06 ASSESSMENT — PAIN SCALES - GENERAL: PAINLEVEL: NO PAIN (0)

## 2023-03-07 NOTE — PROGRESS NOTES
[facility]    Outpatient Occupational Therapy Discharge Note  Patient: Edith GARCÍA Soren  : 1970    Beginning/End Dates of Reporting Period:  2022 to 2023    Referring Provider: Linn RUFFIN    Therapy Diagnosis: pain in dominant UE    Client Self Report: Pt seen in OT from 2048-4529. Pt reported she has not really had any pain in elbow but a little soreness over her dorsal forearm. She stated she changed her work station at home and that seems to be helping also. Pt reports she has been doing the stretches and exercises but is concerned the pain will return.    Objective Measurements:     Objective Measure:  strength Rt hand   Details: 54# extended  54# (increased from 37#, extended 20#)              Outcome Measures (most recent score):      Goals:     Goal Identifier LTG 1   Goal Description Pt will be able to  a pan to cook a meal   Target Date 22   Date Met  23   Progress (detail required for progress note):       Goal Identifier STG 1   Goal Description Pt will be able to follow and tolerate HEP consistently for 2 weeks   Target Date 22   Date Met  22   Progress (detail required for progress note):       Goal Identifier LTG 2   Goal Description Pt will have a decrease in pain from 4/10 to 1/10 to allow her to tolerated work tasks without pain.   Target Date 22   Date Met  23   Progress (detail required for progress note):       Goal Identifier     Goal Description     Target Date     Date Met      Progress (detail required for progress note):       Goal Identifier     Goal Description     Target Date     Date Met      Progress (detail required for progress note):       Goal Identifier     Goal Description     Target Date     Date Met      Progress (detail required for progress note):       Goal Identifier     Goal Description      Target Date     Date Met      Progress (detail required for progress note):       Goal Identifier     Goal Description     Target Date     Date Met      Progress (detail required for progress note):           Plan:  Discharge from therapy.    Discharge:    Reason for Discharge: Patient has met all goals.    Equipment Issued: none    Discharge Plan: Patient to continue home program.

## 2023-03-14 ENCOUNTER — VIRTUAL VISIT (OUTPATIENT)
Dept: PHARMACY | Facility: PHYSICIAN GROUP | Age: 53
End: 2023-03-14
Payer: COMMERCIAL

## 2023-03-14 DIAGNOSIS — E03.9 ACQUIRED HYPOTHYROIDISM: ICD-10-CM

## 2023-03-14 DIAGNOSIS — J45.20 MILD INTERMITTENT ASTHMA WITHOUT COMPLICATION: ICD-10-CM

## 2023-03-14 DIAGNOSIS — J30.1 ALLERGIC RHINITIS DUE TO POLLEN, UNSPECIFIED SEASONALITY: ICD-10-CM

## 2023-03-14 DIAGNOSIS — I10 PRIMARY HYPERTENSION: ICD-10-CM

## 2023-03-14 DIAGNOSIS — E11.9 TYPE 2 DIABETES MELLITUS WITHOUT COMPLICATION, WITHOUT LONG-TERM CURRENT USE OF INSULIN (H): Primary | ICD-10-CM

## 2023-03-14 DIAGNOSIS — Z71.85 IMMUNIZATION COUNSELING: ICD-10-CM

## 2023-03-14 PROCEDURE — 99605 MTMS BY PHARM NP 15 MIN: CPT

## 2023-03-14 RX ORDER — LORATADINE 10 MG/1
10 TABLET ORAL DAILY PRN
COMMUNITY

## 2023-03-14 RX ORDER — ASPIRIN 81 MG/1
81 TABLET ORAL DAILY
COMMUNITY

## 2023-03-14 NOTE — PATIENT INSTRUCTIONS
"Recommendations from today's MTM visit:                                                    MTM (medication therapy management) is a service provided by a clinical pharmacist designed to help you get the most of out of your medicines.   Today we reviewed what your medicines are for, how to know if they are working, that your medicines are safe and how to make your medicine regimen as easy as possible.      1. You are eligible for the following vaccines at this time: a dose of [id number] (a pneumococcal vaccine), a two-dose series of Shingrix (the shingles vaccine), and a bivalent COVID-19 booster vaccine. You can receive these vaccines at your local pharmacy at your convenience.     The following recommendations are being made directly to LALY Paez:  1. Consider initiation of a moderate intensity statin (such as atorvastatin 20 mg daily) based on the patient's age and diagnosis of type 2 diabetes per the 2018 ACC/AHA Cholesterol Guidelines.     Follow-up: 1 year, sooner if needed.     It was great speaking with you today.  I value your experience and would be very thankful for your time in providing feedback in our clinic survey. In the next few days, you may receive an email or text message from [employer] with a link to a survey related to your  clinical pharmacist.\"     To schedule another MTM appointment, please call the clinic directly or you may call the MTM scheduling line at [phone] or toll-free at [phone].     My Clinical Pharmacist's contact information:                                                      Please feel free to contact me with any questions or concerns you have.      Reji Quinn, PharmD  Medication Therapy Management Pharmacist  [facility]  Office phone: [phone]   "

## 2023-03-14 NOTE — PROGRESS NOTES
Medication Therapy Management (MTM) Encounter    ASSESSMENT:                            Medication Adherence/Access: No issues identified    Type 2 Diabetes: Patient would benefit from initiation of a moderate intensity statin (such as atorvastatin 20 mg daily) due to having type 2 diabetes and being between the age of 40-75 per the 2018 ACC/AHA Cholesterol Guidelines.     Hypertension: Stable. Patient is meeting blood pressure goal of < 130/80mmHg.    Hypothyroidism: Stable. Last TSH is within normal limits.     Asthma: Stable.    Allergic Rhinitis: Stable.    Immunizations: Patient is eligible for the following vaccines: a dose of PCV20, a two-dose series of Shingrix, and a bivalent COVID-19 booster vaccine.     PLAN:                            The following recommendations are being made directly to LALY Paez:  1. Consider initiation of a moderate intensity statin (such as atorvastatin 20 mg daily) based on the patient's age and diagnosis of type 2 diabetes per the 2018 ACC/AHA Cholesterol Guidelines.     The following recommendations are being made directly to the patient:  1. You are eligible for the following vaccines at this time: a dose of Rafzbyn19 (a pneumococcal vaccine), a two-dose series of Shingrix (the shingles vaccine), and a bivalent COVID-19 booster vaccine. You can receive these vaccines at your local pharmacy at your convenience.     Follow-up: 1 year, sooner if needed.     SUBJECTIVE/OBJECTIVE:                          Edith Doty is a 52 year old female called for an initial visit. She was referred to me from her insurance company, [employer].      Reason for visit: Initial visit - comprehensive medication review.    Allergies/ADRs: Reviewed in chart  Past Medical History: Reviewed in chart  Tobacco: She reports that she has never smoked. She has never used smokeless tobacco.  Alcohol: occasionally.   Caffeine: soda every once in a while.    Medication Adherence/Access: no issues  reported  Patient uses pill box(es).  Patient takes medications 2 time(s) per day.   Per patient, misses medication 0 times per week.   Medication barriers: none.   The patient fills medications at [facility]: NO, fills medications at [facility] in [place], [place].    Type 2 Diabetes:  Currently taking Trulicity (dulaglutide) 1.5 mg every 7 days (on ), metformin 1000 mg twice daily with meals. Patient is not experiencing side effects. In the past, Trulicity 3 mg dose made her have a stomach ache.   Blood sugar monitorin time daily. Ranges (patient reported): See below  Fasting- 120-140 mg/dL  Symptoms of low blood sugar? Patient gets a feeling like she needs to eat, Frequency of lows- only when she does not eat.  Symptoms of high blood sugar? none  Eye exam: due  Foot exam: due  Diet: protein (chicken), vegetables, starch, sometimes salad.   Exercise: not currently. Tries to get on the treadmill more.   Aspirin: aspirin 81 mg daily, denies issues.  Statin: No   ACEi/ARB: Yes: lisinopril 10 mg twice daily.   Urine Albumin:   Lab Results   Component Value Date    UMALCR 73.27 (H) 2022      Lab Results   Component Value Date    A1C 7.1 2023    A1C 6.6 2022    A1C 6.3 2021    A1C 7.1 2020    A1C 6.5 2019    A1C 7.1 2019    A1C 6.5 2018    A1C 6.5 2017     Hypertension: Current medications include chlorthalidone 25 mg daily, metoprolol succinate  mg in the morning and 50 mg in the afternoon, lisinopril 10 mg twice daily, amlodipine 10 mg daily. Patient does not self-monitor blood pressure often.  Patient reports no current medication side effects.  BP Readings from Last 3 Encounters:   23 110/70   23 116/74   23 110/70     Hypothyroidism: Patient is taking Levothyroxine 150 mcg daily. Patient is having the following symptoms: none.   TSH   Date Value Ref Range Status   2023 0.50 0.30 - 4.20 uIU/mL Final   2022 1.90 0.40 -  4.00 mU/L Final   06/25/2018 0.14 (L) 0.40 - 4.00 mU/L Final     Asthma: Current medications: Short-Acting Bronchodilator: Levalbuterol MDI and pt reports using infrequently.  Triggers include: upper respiratory infections, humidity and cold air.  Patient reports the following symptoms: none.  Asthma Action Plan on file: YES  ACT Total Scores 9/11/2020 4/13/2022 11/9/2022   ACT TOTAL SCORE (Goal Greater than or Equal to 20) 24 22 22   In the past 12 months, how many times did you visit the emergency room for your asthma without being admitted to the hospital? 0 0 0   In the past 12 months, how many times were you hospitalized overnight because of your asthma? 0 0 0     Allergic Rhinitis: Current medications include fluticasone nasal spray 50 mcg/actuation 2 sprays into each nostril once daily, loratadine 10 mg daily as needed (usually in the summer). Primary symptoms are nasal congestion, sneezing, and scratchy throat.  Primary triggers are dust mites. Patient feels that current therapy is effective.     Supplements: Current supplements include a multivitamin daily. Patient reports no issues.    Immunizations:   Most Recent Immunizations   Administered Date(s) Administered     COVID-19 Vaccine 12+ (Pfizer) 04/13/2021, 03/23/2021     COVID-19 Vaccine 18+ (Moderna) 01/21/2022     FLU 6-35 months 10/30/2017     HepB 04/06/2005, 01/26/2005, 12/01/2004     Influenza (IIV3) PF 11/07/2016     Influenza Intranasal Vaccine 10/11/2013     Influenza Vaccine >6 months (Alfuria,Fluzone) 11/19/2021     Influenza Vaccine, 6+MO IM (QUADRIVALENT W/PRESERVATIVES) 09/28/2019     Nasal Influenza Vaccine 2-49 (FluMist) 11/02/2014     Pneumococcal 23 valent 12/01/2004     TD (ADULT, 7+) 11/02/2005     TDAP Vaccine (Adacel) 12/02/2019     Today's Vitals: LMP 03/07/2015 (Exact Date)   ----------------  I spent 15 minutes with this patient today. I offer these suggestions for consideration by LALY Paez. A copy of the visit note was  provided to the patient's provider(s).    A summary of these recommendations was sent via [employer].    Reji Quinn, PharmD  Medication Therapy Management Pharmacist  [facility]  Office phone: [phone]    Telemedicine Visit Details  Type of service:  Telephone visit  Start Time: 1:02 PM  End Time: 1:17 PM     Medication Therapy Recommendations  Immunization counseling    Rationale: Preventive therapy - Needs additional medication therapy - Indication   Recommendation: Order Vaccine - PREVNAR 20 IM   Status: Patient Agreed - Adherence/Education   Note: Patient is eligible for the following vaccines: a dose of PCV20, a two-dose series of Shingrix, and a bivalent COVID-19 booster vaccine.         Type 2 diabetes mellitus without complication, without long-term current use of insulin (H)    Rationale: Preventive therapy - Needs additional medication therapy - Indication   Recommendation: Start Medication - atorvastatin 20 MG tablet   Status: Contact Provider - Awaiting Response

## 2023-03-14 NOTE — Clinical Note
Dutch Esteves,   Please see my note/recommendations from my MTM visit with [place]. Reach out with any questions or concerns!  Thanks! Reji Quinn, PharmD Medication Therapy Management Pharmacist [facility] Office phone: [phone]

## 2023-03-17 DIAGNOSIS — I10 BENIGN ESSENTIAL HYPERTENSION: ICD-10-CM

## 2023-03-20 RX ORDER — LISINOPRIL 10 MG/1
10 TABLET ORAL 2 TIMES DAILY
Qty: 180 TABLET | Refills: 0 | Status: SHIPPED | OUTPATIENT
Start: 2023-03-20 | End: 2023-09-13

## 2023-03-20 NOTE — TELEPHONE ENCOUNTER
Lisinopril 10 mg       Last Written Prescription Date:  4/13/22  Last Fill Quantity: 180,   # refills: 3  Last Office Visit: 3/6/23  Future Office visit:       Routing refill request to provider for review/approval because:  ACE Inhibitors (Including Combos) Protocol Failed      Normal serum creatinine on file in past 12 months          Recent Labs   Lab Test 03/03/23  0821 09/23/21  0730 06/03/19  0905   CR 1.09*   < >  --    CREAT  --   --  0.6    < > = values in this interval not displayed.      Ok to refill medication if creatinine is low    Normal serum potassium on file in past 12 months        Recent Labs   Lab Test 03/03/23  0821   POTASSIUM 3.2*

## 2023-03-31 DIAGNOSIS — E11.65 TYPE 2 DIABETES MELLITUS WITH HYPERGLYCEMIA, WITHOUT LONG-TERM CURRENT USE OF INSULIN (H): ICD-10-CM

## 2023-03-31 NOTE — TELEPHONE ENCOUNTER
Trulicity      Last Written Prescription Date:  03/06/23  Last Fill Quantity: 2ml,   # refills: 3  Last Office Visit: 03/06/23  Future Office visit:

## 2023-04-02 RX ORDER — DULAGLUTIDE 1.5 MG/.5ML
INJECTION, SOLUTION SUBCUTANEOUS
Qty: 2 ML | Refills: 3 | Status: SHIPPED | OUTPATIENT
Start: 2023-04-02 | End: 2023-07-19

## 2023-04-10 ENCOUNTER — TELEPHONE (OUTPATIENT)
Dept: FAMILY MEDICINE | Facility: OTHER | Age: 53
End: 2023-04-10

## 2023-04-10 DIAGNOSIS — E11.65 TYPE 2 DIABETES MELLITUS WITH HYPERGLYCEMIA, WITHOUT LONG-TERM CURRENT USE OF INSULIN (H): Primary | ICD-10-CM

## 2023-04-18 DIAGNOSIS — I10 BENIGN ESSENTIAL HYPERTENSION: ICD-10-CM

## 2023-04-18 NOTE — TELEPHONE ENCOUNTER
Chorthalidone       Last Written Prescription Date:  12/20/2022  Last Fill Quantity: 90,   # refills: 0  Last Office Visit: 03/06/2023  Future Office visit:       Routing refill request to provider for review/approval because:

## 2023-04-19 RX ORDER — CHLORTHALIDONE 25 MG/1
25 TABLET ORAL DAILY
Qty: 90 TABLET | Refills: 0 | Status: SHIPPED | OUTPATIENT
Start: 2023-04-19 | End: 2023-07-11

## 2023-04-24 ENCOUNTER — HOSPITAL ENCOUNTER (OUTPATIENT)
Dept: EDUCATION SERVICES | Facility: HOSPITAL | Age: 53
Discharge: HOME | End: 2023-04-24
Attending: NURSE PRACTITIONER | Admitting: NURSE PRACTITIONER
Payer: COMMERCIAL

## 2023-04-24 VITALS
HEIGHT: 61 IN | HEART RATE: 83 BPM | RESPIRATION RATE: 16 BRPM | WEIGHT: 187.9 LBS | OXYGEN SATURATION: 98 % | SYSTOLIC BLOOD PRESSURE: 103 MMHG | BODY MASS INDEX: 35.48 KG/M2 | DIASTOLIC BLOOD PRESSURE: 75 MMHG

## 2023-04-24 DIAGNOSIS — E11.9 TYPE 2 DIABETES MELLITUS WITHOUT COMPLICATION, WITHOUT LONG-TERM CURRENT USE OF INSULIN (H): ICD-10-CM

## 2023-04-24 ASSESSMENT — PAIN SCALES - GENERAL: PAINLEVEL: NO PAIN (0)

## 2023-04-24 NOTE — PROGRESS NOTES
"Diabetes Self-Management Education & Support    Presents for: Individual review    Type of Service: In Person Visit    Assessment Type:   ASSESSMENT:  Trinidad returns for BG review, follow up.   A1C 3/3/2023 7.1, no change since last lab.   2 week meter report: Average 147. 123-179. Most reading are slightly above target.     Metformin 1000 mg twce daily. Truclicity 1.5 mg once weekly- does not tolerate 3 mg dosing.   Trinidad would like better control, interested in other medication options. Reviewed labs on file, including eGFR.     Discussed SGLT2 today- reviewed action, side effects, administration, importance to stay hydrated. Contact PCP or DRC if noticing UTI sx or yeast infection symptoms.  Denies hx or current bladder cancer. Has had UTI in past, but not frequent concerns for her. Pt would like to consider if PCP agrees with this plan and if insurance will cover.      In the coming months, described, \"lots of stress\".  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     /75. Statin use. No asa- defer to PCP for recommendations.   Foot exam due, denies current concerns. Eye exam-scheduled.   No tobacco use.     Wt Readings from Last 10 Encounters:   04/24/23 85.2 kg (187 lb 14.4 oz)   03/06/23 83 kg (183 lb)   01/30/23 85.8 kg (189 lb 3.2 oz)   01/09/23 87.5 kg (193 lb)   11/09/22 87.5 kg (193 lb)   10/28/22 87.9 kg (193 lb 11.2 oz)   10/20/22 87 kg (191 lb 12.8 oz)   08/26/22 87.5 kg (192 lb 14.4 oz)   05/10/22 90.3 kg (199 lb 1.6 oz)   04/28/22 90.3 kg (199 lb)       Patient's most recent   Lab Results   Component Value Date    A1C 7.1 03/03/2023    A1C 7.1 08/27/2020    HEMOGLOBINA1 7.1 10/28/2022     is not meeting goal of <7.0    Diabetes knowledge and skills assessment:   Patient is knowledgeable in diabetes management concepts related to: Healthy Eating, Monitoring and Taking Medication    Continue education with the following diabetes management concepts: Healthy Eating, Being " Active, Monitoring, Taking Medication, Problem Solving, Reducing Risks and Healthy Coping    Based on learning assessment above, most appropriate setting for further diabetes education would be: Individual setting.      PLAN  Monitoring:  Your A1C was 7.1 on 3/3/2023. Goal is less than 7%   Previous A1C: 7.1-October   Next A1C: June 4th- soonest.     Check blood sugars 1 x/day.   Fasting in the morning or 2 hours after your largest meal are good times to check.     Target blood sugar: Fasting or before meals target is . 2 hours after meal <180.     Medications:   Continue:Metformin, Trulicity  Considering: Jardiance or Farxiga.      Healthy Eating:    Mediterranean Lifestyle: Fish/seafood 2 times a week, vegetables, fruit, nuts, legumes, whole grains, water, regular activity.      Limit higher carbohydrate foods such as: rice, breads, cereal, pasta, sweets.   Avoid sugary drinks: regular soda/pop, juice, sports drinks. (Sugar free, zero are okay).     Snacks: Try to  work on healthy, low carbohydrate food choices.   Good snack examples: meat, cheese, cottage cheese, nuts, hard boiled egg, veggies, fruit/berries, yogurt (Greek yogurt/protein).     Target Carb:Women 45 grams/meal 15-30 grams/snacks.  Less if working towards weight loss.     Activity/Exercise:   Increase exercise as tolerated, even multiple short times during your day helps.   Adult goal is 150 minutes/week =  30 minutes 5 days of the week.   Weight: 187#    Foot exam: yearly.  Eye exam: scheduled.     Follow up: 6/22/2023 at 3 pm for A1C.   I will call after talking with your primary about meds.       Please bring your meter/reader to your appointment.   Topics to cover at upcoming visits: Healthy Eating, Being Active, Monitoring, Taking Medication, Problem Solving, Reducing Risks and Healthy Coping    See Care Plan for co-developed, patient-state behavior change goals.  AVS provided for patient today.    Education Materials Provided:  No new  "materials provided today      SUBJECTIVE/OBJECTIVE:  Presents for: Individual review  Accompanied by: Self  Diabetes education in the past 24mo: Yes  Focus of Visit: Monitoring, Taking Medication  Diabetes type: Type 2  Date of diagnosis: August 2017  Disease course: Improving  How confident are you filling out medical forms by yourself:: Extremely  Diabetes management related comments/concerns: none  Transportation concerns: No  Difficulty affording diabetes medication?: No  Difficulty affording diabetes testing supplies?: No  Other concerns:: None  Cultural Influences/Ethnic Background:  Choose not to answer    Diabetes Symptoms & Complications:  Fatigue: Sometimes (work- working more)  Neuropathy: Yes (noticing a \"little bit\" of numbness on left foot, middle of ball of foot.)  Polydipsia: No  Polyphagia: No  Polyuria: No  Visual change: No (due for exam February.)  Slow healing wounds: No  Symptom course: Stable  Weight trend: Decreasing  Complications assessed today?: Yes  Autonomic neuropathy: No  CVA: No  Heart disease: No  Nephropathy: No  Peripheral neuropathy: No  Foot ulcerations: No  Peripheral Vascular Disease: No  Retinopathy: No  Sexual dysfunction: No    Patient Problem List and Family Medical History reviewed for relevant medical history, current medical status, and diabetes risk factors.    Vitals:  /75   Pulse 83   Resp 16   Ht 1.543 m (5' 0.75\")   Wt 85.2 kg (187 lb 14.4 oz)   LMP 03/07/2015 (Exact Date)   SpO2 98%   BMI 35.80 kg/m    Estimated body mass index is 35.8 kg/m  as calculated from the following:    Height as of this encounter: 1.543 m (5' 0.75\").    Weight as of this encounter: 85.2 kg (187 lb 14.4 oz).   Last 3 BP:   BP Readings from Last 3 Encounters:   04/24/23 103/75   03/06/23 110/70   01/30/23 116/74       History   Smoking Status     Never   Smokeless Tobacco     Never       Labs:  Lab Results   Component Value Date    A1C 7.1 03/03/2023    A1C 7.1 08/27/2020    " HEMOGLOBINA1 7.1 10/28/2022     Lab Results   Component Value Date     03/03/2023     04/13/2022     06/25/2018     Lab Results   Component Value Date    LDL 52 03/03/2023    LDL 78 04/08/2019     HDL Cholesterol   Date Value Ref Range Status   04/08/2019 73 >49 mg/dL Final     Direct Measure HDL   Date Value Ref Range Status   03/03/2023 54 >=50 mg/dL Final   ]  GFR Estimate   Date Value Ref Range Status   03/03/2023 61 >60 mL/min/1.73m2 Final     Comment:     eGFR calculated using 2021 CKD-EPI equation.   06/03/2019 >90 >60 mL/min/[1.73_m2] Final     GFR Estimate If Black   Date Value Ref Range Status   06/03/2019 >90 >60 mL/min/[1.73_m2] Final     Lab Results   Component Value Date    CR 1.09 03/03/2023    CR 0.65 06/25/2018     No results found for: MICROALBUMIN    Healthy Eating:  Healthy Eating Assessed Today: Yes  Cultural/Samaritan diet restrictions?: No  Meal planning/habits: Carb counting  How many times a week on average do you eat food made away from home (restaurant/take-out)?: 2  Meals include: Breakfast, Dinner, Afternoon Snack  Breakfast: cereal mostly. milk. sometimes banana.  Lunch: dry cereal. doesn't always have lunch. left over, sometin in fridge. chicken victorino.  Dinner: chicken. tacos. meatloaf.  Beverages: Water, Milk, Juice    Being Active:  Being Active Assessed Today: Yes  Exercise:: Yes (walking a lot. generally treadmill 20-30 minutes.)  Days per week of moderate to strenuous exercise (like a brisk walk): 1  On average, minutes per day of exercise at this level: 20  How intense was your typical exercise? : Moderate (like brisk walking)  Exercise Minutes per Week: 20  Barrier to exercise: Time    Monitoring:  Monitoring Assessed Today: Yes  Did patient bring glucose meter to appointment? : Yes  Blood Glucose Meter: Accu-chek  Times checking blood sugar at home (number): 1  Times checking blood sugar at home (per): Day  Blood glucose trend: Fluctuating    Taking  Medications:  Diabetes Medication(s)     Biguanides       metFORMIN (GLUCOPHAGE) 1000 MG tablet    Take 1 tablet (1,000 mg) by mouth 2 times daily (with meals)    Incretin Mimetic Agents       TRULICITY 1.5 MG/0.5ML pen    ADMINISTER 1.5 MG UNDER THE SKIN EVERY 7 DAYS          Taking Medication Assessed Today: Yes  Current Treatments: Oral Medication (taken by mouth) (Metformin 1000 mg twce daily. Truclicity 1.5 mg once weekly)  Problems taking diabetes medications regularly?: No  Diabetes medication side effects?: No    Problem Solving:  Problem Solving Assessed Today: Yes  Is the patient at risk for hypoglycemia?: No    Reducing Risks:  CAD Risks: Diabetes Mellitus, Family history, Hypertension, Obesity, Sedentary lifestyle, Stress  Has dilated eye exam at least once a year?: Yes  Sees dentist every 6 months?: Yes  Feet checked by healthcare provider in the last year?: No    Healthy Coping:  Healthy Coping Assessed Today: Yes  Emotional response to diabetes: Confidence diabetes can be controlled, Concern for health and well-being  Informal Support system:: Children, Family, Parent, Spouse  Stage of change: ACTION (Actively working towards change)  Patient Activation Measure Survey Score:      12/19/2018    10:00 AM 11/9/2022     3:59 PM   JAIME Score (Last Two)   JAIME Raw Score 40 33   Activation Score 100 65.8   JAIME Level 4 3         Care Plan and Education Provided:  Care Plan: Diabetes   Updates made by Reshma Sherman RN since 4/25/2023 12:00 AM      Problem: Diabetes Self-Management Education Needed to Optimize Self-Care Behaviors       Goal: Healthy Eating - follow a healthy eating pattern for diabetes    This Visit's Progress: 100%   Recent Progress: 100%   Note:    Reviewed dietary recommendations 30-45 grams/meals/carb. 15-30 grams/snacks.      Task: Provide education on portion control and consistency in amount, composition and timing of food intake Completed 4/25/2023   Responsible User: Reshma Sherman RN       Task: Provide education on managing carbohydrate intake (carbohydrate counting, plate planning method, etc.) Completed 4/25/2023   Responsible User: Reshma Sherman RN      Goal: Being Active - get regular physical activity, working up to at least 150 minutes per week    This Visit's Progress: 10%   Recent Progress: On track   Note:    Increase activity as tolerated. Adult target 150 minutes/week.   Start small and increase time and frequency.      Goal: Taking Medication - patient is consistently taking medications as directed    Recent Progress: 100%   Note:    Takes medication as prescribed. No missed doses.   Metformin  Trulicity    Tolerating, no side effects on current plan.  Considering SGLT2.      Goal: Reducing Risks - know how to prevent and treat long-term diabetes complications    Recent Progress: 80%   Note:    ADA BP target <130/80, meeting.   Statin use  No ASA use, defer to PCP for recommendations.   Eye exam due February, scheduled.   Foot exam due yearly. Denies current concerns.   No Tobacco use.       Task: Provide education on recommended care for dental, eye and foot health Completed 4/25/2023   Responsible User: Reshma Sherman RN      Goal: Healthy Coping - use available resources to cope with the challenges of managing diabetes    This Visit's Progress: On track      Task: Discuss methods for coping with stress Completed 4/25/2023   Responsible User: Reshma Sherman RN          Time Spent: 30 minutes  Encounter Type: Individual    Any diabetes medication dose changes were made via the CDE Protocol per the patient's primary care provider. A copy of this encounter was shared with the provider.  Reshma Sherman RN Diabetes Educator,  [phone]  4/25/2023 at 9:44 AM

## 2023-04-24 NOTE — PATIENT INSTRUCTIONS
Recap of our visit:     Monitoring:  Your A1C was 7.1 on 3/3/2023. Goal is less than 7%   Previous A1C: 7.1-October   Next A1C: June 4th- soonest.     Check blood sugars 1 x/day.   Fasting in the morning or 2 hours after your largest meal are good times to check.     Target blood sugar: Fasting or before meals target is . 2 hours after meal <180.     Medications:   Continue:Metformin, Trulicity  Considering: Jardiance or Farxiga.      Healthy Eating:    Mediterranean Lifestyle: Fish/seafood 2 times a week, vegetables, fruit, nuts, legumes, whole grains, water, regular activity.      Limit higher carbohydrate foods such as: rice, breads, cereal, pasta, sweets.   Avoid sugary drinks: regular soda/pop, juice, sports drinks. (Sugar free, zero are okay).     Snacks: Try to  work on healthy, low carbohydrate food choices.   Good snack examples: meat, cheese, cottage cheese, nuts, hard boiled egg, veggies, fruit/berries, yogurt (Greek yogurt/protein).     Target Carb:Women 45 grams/meal 15-30 grams/snacks.  Less if working towards weight loss.     Activity/Exercise:   Increase exercise as tolerated, even multiple short times during your day helps.   Adult goal is 150 minutes/week =  30 minutes 5 days of the week.   Weight: 187#    Foot exam: yearly.  Eye exam: scheduled.     Follow up: 6/22/2023 at 3 pm for A1C.   I will call after talking with your primary about meds.       Please bring your meter/reader to your appointment.     If you have any questions or concerns, please call me at [phone] or send [employer] message.    Thank you for coming in today!  Reshma Sherman, [facility] Diabetes Educator

## 2023-04-28 DIAGNOSIS — E11.9 TYPE 2 DIABETES MELLITUS WITHOUT COMPLICATION, WITHOUT LONG-TERM CURRENT USE OF INSULIN (H): Primary | ICD-10-CM

## 2023-04-28 NOTE — TELEPHONE ENCOUNTER
S-(situation): SGLT2 consideration    B-(background):   Metformin  Trulicity 1.5 mg once weekly- doesn't tolerate 3 mg dose.   A1C 7.1- stable    A-(assessment):   [place]inidad is asking/would like to consider SGLT2 if it will help provide better glucose control-if PCP recommends/agrees.   Target A1C <7.0% and has been just above last several checks.   She would like BG in better control.     During her DRC visit, reviewed action, side effects, administration.   No strong history of UTI or yeast infection. Has has a couple in the past., nothing recently.      Pt may benefit with additional medication/class to reach her BG/A1C goals.      R-(recommendations):   Pend order for PCP for consideration if in agreement to sign.   DRC RN will follow up with patient.     Reshma Sherman RN Diabetes Educator,  [phone]  4/28/2023 at 3:14 PM

## 2023-05-18 ENCOUNTER — PATIENT OUTREACH (OUTPATIENT)
Dept: EDUCATION SERVICES | Facility: HOSPITAL | Age: 53
End: 2023-05-18

## 2023-05-18 NOTE — PROGRESS NOTES
Diabetes Self-Management Education & Support    Follow up Jardiance start. 10 mg once daily.     Trinidad feels like she is tolerating, denies side effects. Is drinking water, has noticed increased urination, anticipated. .     BG trending down- 119, 120-130 range.   She is pleased with results.     Continue plan. Pt aware to call with concerns/questions.  Next [facility] visit June 22.    Reshma Sherman RN Diabetes Educator,  [phone]  5/18/2023 at 3:49 PM

## 2023-05-21 DIAGNOSIS — E11.9 TYPE 2 DIABETES MELLITUS WITHOUT COMPLICATION, WITHOUT LONG-TERM CURRENT USE OF INSULIN (H): ICD-10-CM

## 2023-05-24 RX ORDER — LANCETS
EACH MISCELLANEOUS
Qty: 100 EACH | Refills: 3 | Status: SHIPPED | OUTPATIENT
Start: 2023-05-24 | End: 2023-11-09

## 2023-06-03 ENCOUNTER — HEALTH MAINTENANCE LETTER (OUTPATIENT)
Age: 53
End: 2023-06-03

## 2023-06-05 DIAGNOSIS — J30.1 SEASONAL ALLERGIC RHINITIS DUE TO POLLEN: ICD-10-CM

## 2023-06-05 RX ORDER — FLUTICASONE PROPIONATE 50 MCG
2 SPRAY, SUSPENSION (ML) NASAL DAILY
Qty: 16 G | Refills: 3 | Status: SHIPPED | OUTPATIENT
Start: 2023-06-05 | End: 2023-08-28

## 2023-06-09 ENCOUNTER — TRANSFERRED RECORDS (OUTPATIENT)
Dept: HEALTH INFORMATION MANAGEMENT | Facility: CLINIC | Age: 53
End: 2023-06-09
Payer: COMMERCIAL

## 2023-06-09 LAB — RETINOPATHY: NEGATIVE

## 2023-06-22 ENCOUNTER — HOSPITAL ENCOUNTER (OUTPATIENT)
Dept: EDUCATION SERVICES | Facility: HOSPITAL | Age: 53
Discharge: HOME | End: 2023-06-22
Attending: NURSE PRACTITIONER | Admitting: NURSE PRACTITIONER
Payer: COMMERCIAL

## 2023-06-22 VITALS
BODY MASS INDEX: 35.5 KG/M2 | HEIGHT: 61 IN | DIASTOLIC BLOOD PRESSURE: 74 MMHG | SYSTOLIC BLOOD PRESSURE: 122 MMHG | RESPIRATION RATE: 16 BRPM | WEIGHT: 188 LBS | HEART RATE: 87 BPM | OXYGEN SATURATION: 98 %

## 2023-06-22 DIAGNOSIS — E11.9 TYPE 2 DIABETES MELLITUS WITHOUT COMPLICATION, WITHOUT LONG-TERM CURRENT USE OF INSULIN (H): ICD-10-CM

## 2023-06-22 LAB — HBA1C MFR BLD: 6.9 % (ref 4.3–?)

## 2023-06-22 PROCEDURE — 83036 HEMOGLOBIN GLYCOSYLATED A1C: CPT | Performed by: PHYSICIAN ASSISTANT

## 2023-06-22 PROCEDURE — G0108 DIAB MANAGE TRN  PER INDIV: HCPCS

## 2023-06-22 ASSESSMENT — PAIN SCALES - GENERAL: PAINLEVEL: NO PAIN (0)

## 2023-06-22 NOTE — PATIENT INSTRUCTIONS
Recap of our visit:     Monitoring:  Your A1C was 6.9 today. Goal is less than 7%   Previous A1C: 7.1%   Next A1C: September    Check blood sugars 1 x/day. Vary times: Fasting in the morning or 2 hours after your largest meal are good times to check.     Target blood sugar: Fasting or before meals target is . 2 hours after meal <180.    We only need 1/2 of these numbers to be within target then your A1c will be within target.    Medications:   Continue:Metformin 1000 mg twice daily. Truclicity 1.5 mg once weekly. Jardiance 10 mg daily.     Healthy Eating:  diabetesfoodhub.org     Target Carb: Women 45 grams/meal 15-30 grams carbs optional snack.   Less if working towards weight loss.     Make sure you always have a protein with any all carb option      Mediterranean Lifestyle: Fish/seafood 2 times a week, vegetables, fruit, nuts, legumes, whole grains, water, regular activity.    Eat a Healthy diet  Eat more vegetables/plants in your diet  Eat healthy fats  Olive oil  Avocados  Eat healthy proteins  Poultry without the skin  Fish  Limit red meat     Limit higher carbohydrate foods such as: rice, breads, cereal, pasta, sweets.   Avoid sugary drinks: regular soda/pop, juice, sports drinks. (Sugar free, zero are okay).     Snacks: Try to  work on healthy, low carbohydrate food choices.   Good snack examples: meat, cheese, cottage cheese, nuts, hard boiled egg, veggies, fruit/berries, yogurt (Greek yogurt/protein).       Activity/Exercise:   Try to walk or be active 5-10 minutes after eating any meal     Continue working on healthy eating and moving (start low and slow, work up to 30 min, 5x/week). Increase exercise as tolerated, even multiple short times during your day helps.Slowly work up to 30 minutes of physical activity most days of the week    Adult goal is 150 minutes/week =  30 minutes 5 days of the week.   Aerobic activity 150 minute a week  2 days of resistance exercising      Healthy Coping:    Practicing health promotion can help improve diabetes (suggested by the ADA, March 2019)              Meditation                          Apps: for IPhone and Android                          -Calm                          -Headspace                          -Insight Timer              Yoga              Mindful eating         Other:   Foot exam: yearly, due  Eye exam: yearly, current- report requested   Encourage regular dental check ups.      Great work! Congratulations!    Follow up: 9/26/2023 @ 3 pm for A1C.   Please bring your meter/reader to your appointment.     If you have any questions or concerns, please call me at [phone] (Alicia Nurse directly) or send [employer] message.    Scheduling Number: [phone]    Thank you for coming in today!  Reshma Sherman RN Diabetes Educator

## 2023-06-22 NOTE — PROGRESS NOTES
Diabetes Self-Management Education & Support    Presents for: Individual review    Type of Service: In Person Visit    Assessment Type:   ASSESSMENT:  [place] 52 year old, comes/returns to [facility] for update A1C/BG review.   Last visit, Jardiance 10 mg once daily.     PCP: LALY Paez.   Insurance Coverage:  [employer]     Monitoring:  A1C:  7.1 in March. Update today: 6.9% Pt's personal goal is <6.5%  Meter Report: Unable to download meter today. Reviewed history: all FB, 130, 143, 151, 145, 124, 148.   7 day ave 140. 14 day ave 148.     Diabetes Medications:   Metformin 1000 mg twice daily.   Truclicity 1.5 mg once weekly.   Jardiance 10 mg daily. - started about 1 1/2 month ago. Tolerating. No lightheadedness, dizzines. No UTI sx or yeast concerns.   Talked about possibility going up on dose- will wait to see next A1C in September.     Health Eating:  Had son's graduation party this last week. Has been eating leftovers mostly. Breakfast- cereal mostly.  Sometimes a banana Lunch- typically left overs. Dinner varies with chicken. Meatloaf, tacos, pizza.     Activity:  Is more active with summer her now. Walking around a lot more.     Reducing Risks:   /74  Statin use no- defer to PCP for recommendations.   ASA use yes  Tobacco use- no   Foot exam due, no concerns   Eye exam- 2023 [facility]- report requested, Pt signed ADRIANA today.    PCP follow up  Scheduled in September.      Allergies   Allergen Reactions     Amoxicillin Trihydrate Itching     Augmentin       Clavulanic Acid Potassium Itching     Augmentin       Levaquin [Levofloxacin] Swelling     Penicillins Hives        Wt Readings from Last 10 Encounters:   23 85.3 kg (188 lb)   23 85.2 kg (187 lb 14.4 oz)   23 83 kg (183 lb)   23 85.8 kg (189 lb 3.2 oz)   23 87.5 kg (193 lb)   22 87.5 kg (193 lb)   10/28/22 87.9 kg (193 lb 11.2 oz)   10/20/22 87 kg (191 lb 12.8 oz)   22 87.5 kg  (192 lb 14.4 oz)   05/10/22 90.3 kg (199 lb 1.6 oz)     Patient's most recent   Lab Results   Component Value Date    A1C 7.1 03/03/2023    A1C 7.1 08/27/2020    HEMOGLOBINA1 6.9 06/22/2023     is meeting goal of <7.0    Diabetes knowledge and skills assessment:   Patient is knowledgeable in diabetes management concepts related to: Healthy Eating, Being Active, Monitoring and Taking Medication  Continue education with the following diabetes management concepts: Healthy Eating, Being Active, Monitoring, Taking Medication, Problem Solving, Reducing Risks and Healthy Coping  Based on learning assessment above, most appropriate setting for further diabetes education would be: Individual setting.      PLAN  Monitoring:  Your A1C was 6.9 today. Goal is less than 7%   Previous A1C: 7.1%   Next A1C: September    Check blood sugars 1 x/day. Vary times: Fasting in the morning or 2 hours after your largest meal are good times to check.     Target blood sugar: Fasting or before meals target is . 2 hours after meal <180.    We only need 1/2 of these numbers to be within target then your A1c will be within target.    Medications:   Continue:Metformin 1000 mg twice daily. Truclicity 1.5 mg once weekly. Jardiance 10 mg daily.     Healthy Eating:  diabetesfoodhub.org     Target Carb: Women 45 grams/meal 15-30 grams carbs optional snack.   Less if working towards weight loss.     Make sure you always have a protein with any all carb option      Mediterranean Lifestyle: Fish/seafood 2 times a week, vegetables, fruit, nuts, legumes, whole grains, water, regular activity.    Eat a Healthy diet  a. Eat more vegetables/plants in your diet  b. Eat healthy fats  i. Olive oil  ii. Avocados  c. Eat healthy proteins  i. Poultry without the skin  ii. Fish  iii. Limit red meat     Limit higher carbohydrate foods such as: rice, breads, cereal, pasta, sweets.   Avoid sugary drinks: regular soda/pop, juice, sports drinks. (Sugar free, zero  are okay).     Snacks: Try to  work on healthy, low carbohydrate food choices.   Good snack examples: meat, cheese, cottage cheese, nuts, hard boiled egg, veggies, fruit/berries, yogurt (Greek yogurt/protein).       Activity/Exercise:   Try to walk or be active 5-10 minutes after eating any meal     Continue working on healthy eating and moving (start low and slow, work up to 30 min, 5x/week). Increase exercise as tolerated, even multiple short times during your day helps.Slowly work up to 30 minutes of physical activity most days of the week    Adult goal is 150 minutes/week =  30 minutes 5 days of the week.   i. Aerobic activity 150 minute a week  ii. 2 days of resistance exercising      Healthy Coping:   Practicing health promotion can help improve diabetes (suggested by the ADA, March 2019)              Meditation                          Apps: for [employer] and [employer]                          -Calm                          -Headspace                          -Insight Timer              Yoga              Mindful eating         Other:   Foot exam: yearly, due  Eye exam: yearly, current- report requested   Encourage regular dental check ups.      Great work! Congratulations!    Follow up: 9/26/2023 @ 3 pm for A1C.   Please bring your meter/reader to your appointment.     If you have any questions or concerns, please call me at [phone] ([place], Nurse directly) or send [employer]Ã¢â€žÂ¢ message.    Scheduling Number: [phone]  Topics to cover at upcoming visits: Healthy Eating, Being Active, Monitoring, Taking Medication, Problem Solving, Reducing Risks and Healthy Coping    See Care Plan for co-developed, patient-state behavior change goals.  AVS provided for patient today.    Education Materials Provided:  Jardiance coupon savings card info      SUBJECTIVE/OBJECTIVE:  Presents for: Individual review  Accompanied by: Self  Diabetes education in the past 24mo: Yes  Focus of Visit: Monitoring, Taking Medication  Diabetes  "type: Type 2  Date of diagnosis: August 2017  Disease course: Stable  How confident are you filling out medical forms by yourself:: Extremely  Diabetes management related comments/concerns: a1c today  Transportation concerns: No  Difficulty affording diabetes medication?: No  Difficulty affording diabetes testing supplies?: No  Other concerns:: None  Cultural Influences/Ethnic Background:  Choose not to answer    Diabetes Symptoms & Complications:  Fatigue: Sometimes (work- working more)  Neuropathy: Yes (noticing a \"little bit\" of numbness on left foot, middle of ball of foot.)  Polydipsia: No  Polyphagia: No  Polyuria: Sometimes (Jardiance)  Visual change: No  Slow healing wounds: No  Symptom course: Stable  Weight trend: Stable  Complications assessed today?: Yes  Autonomic neuropathy: No  CVA: No  Heart disease: No  Nephropathy: No  Peripheral neuropathy: No  Foot ulcerations: No  Peripheral Vascular Disease: No  Retinopathy: No  Sexual dysfunction: No    Patient Problem List and Family Medical History reviewed for relevant medical history, current medical status, and diabetes risk factors.    Vitals:  /74   Pulse 87   Resp 16   Ht 1.543 m (5' 0.75\")   Wt 85.3 kg (188 lb)   LMP 03/07/2015 (Exact Date)   SpO2 98%   BMI 35.82 kg/m    Estimated body mass index is 35.82 kg/m  as calculated from the following:    Height as of this encounter: 1.543 m (5' 0.75\").    Weight as of this encounter: 85.3 kg (188 lb).   Last 3 BP:   BP Readings from Last 3 Encounters:   06/22/23 122/74   04/24/23 103/75   03/06/23 110/70       History   Smoking Status     Never   Smokeless Tobacco     Never       Labs:  Lab Results   Component Value Date    A1C 7.1 03/03/2023    A1C 7.1 08/27/2020    HEMOGLOBINA1 6.9 06/22/2023     Lab Results   Component Value Date     03/03/2023     04/13/2022     06/25/2018     Lab Results   Component Value Date    LDL 52 03/03/2023    LDL 78 04/08/2019     HDL " Cholesterol   Date Value Ref Range Status   04/08/2019 73 >49 mg/dL Final     Direct Measure HDL   Date Value Ref Range Status   03/03/2023 54 >=50 mg/dL Final   ]  GFR Estimate   Date Value Ref Range Status   03/03/2023 61 >60 mL/min/1.73m2 Final     Comment:     eGFR calculated using 2021 CKD-EPI equation.   06/03/2019 >90 >60 mL/min/[1.73_m2] Final     GFR Estimate If Black   Date Value Ref Range Status   06/03/2019 >90 >60 mL/min/[1.73_m2] Final     Lab Results   Component Value Date    CR 1.09 03/03/2023    CR 0.65 06/25/2018     No results found for: MICROALBUMIN    Healthy Eating:  Healthy Eating Assessed Today: Yes  Cultural/Muslim diet restrictions?: No  Meal planning/habits: Carb counting  How many times a week on average do you eat food made away from home (restaurant/take-out)?: 2  Meals include: Breakfast, Dinner, Afternoon Snack  Breakfast: cereal mostly. milk. sometimes banana.  Lunch: dry cereal. left over, something in fridge. chicken cordon victorino. sandwich.  Dinner: chicken. tacos. meatloaf.  Beverages: Water, Milk, Juice    Being Active:  Being Active Assessed Today: Yes  Exercise:: Yes (walking more outside lately.)  How intense was your typical exercise? : Moderate (like brisk walking)  Barrier to exercise: Time    Monitoring:  Monitoring Assessed Today: Yes  Did patient bring glucose meter to appointment? : Yes  Blood Glucose Meter: Accu-chek  Times checking blood sugar at home (number): 1  Times checking blood sugar at home (per): Day  Blood glucose trend: Fluctuating    Taking Medications:  Diabetes Medication(s)     Biguanides       metFORMIN (GLUCOPHAGE) 1000 MG tablet    Take 1 tablet (1,000 mg) by mouth 2 times daily (with meals)    Sodium-Glucose Co-Transporter 2 (SGLT2) Inhibitors       empagliflozin (JARDIANCE) 10 MG TABS tablet    Take 1 tablet (10 mg) by mouth daily    Incretin Mimetic Agents       TRULICITY 1.5 MG/0.5ML pen    ADMINISTER 1.5 MG UNDER THE SKIN EVERY 7 DAYS         Taking Medication Assessed Today: Yes  Current Treatments: Oral Medication (taken by mouth) (Metformin 1000 mg twce daily. Truclicity 1.5 mg once weekly. Jardiance 10 mg daily.)  Problems taking diabetes medications regularly?: No  Diabetes medication side effects?: No    Problem Solving:  Problem Solving Assessed Today: Yes  Is the patient at risk for hypoglycemia?: No    Reducing Risks:  Reducing Risks Assessed Today: Yes  Diabetes Risks: Age over 45 years  CAD Risks: Diabetes Mellitus, Family history, Hypertension, Obesity, Sedentary lifestyle, Stress  Has dilated eye exam at least once a year?: Yes (6/9/2023)  Sees dentist every 6 months?: Yes  Feet checked by healthcare provider in the last year?: No    Healthy Coping:  Healthy Coping Assessed Today: Yes  Emotional response to diabetes: Confidence diabetes can be controlled, Concern for health and well-being  Informal Support system:: Children, Family, Parent, Spouse  Stage of change: ACTION (Actively working towards change)  Patient Activation Measure Survey Score:      12/19/2018    10:00 AM 11/9/2022     3:59 PM   JAIME Score (Last Two)   JAIME Raw Score 40 33   Activation Score 100 65.8   JAIME Level 4 3       Care Plan and Education Provided:  Care Plan: Diabetes   Updates made by Reshma Sherman, RN since 6/23/2023 12:00 AM      Problem: Diabetes Self-Management Education Needed to Optimize Self-Care Behaviors       Goal: Being Active - get regular physical activity, working up to at least 150 minutes per week    This Visit's Progress: 80%   Recent Progress: 10%   Note:    Increase activity as tolerated. Adult target 150 minutes/week.   Start small and increase time and frequency.      Goal: Monitoring - monitor glucose and ketones as directed    This Visit's Progress: 100%   Recent Progress: 100%   Note:    Tests daily. Mostly fasting, trying to remember to check post meals on occasion.      Goal: Taking Medication - patient is consistently taking  medications as directed    This Visit's Progress: 100%   Recent Progress: 100%   Note:    Takes medication as prescribed. No missed doses.   Metformin  Trulicity  Jardiance.     Tolerating, no side effects on current plan.       Goal: Reducing Risks - know how to prevent and treat long-term diabetes complications    Recent Progress: 80%   Note:    ADA BP target <130/80, meeting.   A1C <7.0- meeting target.   Statin use  No ASA use, defer to PCP for recommendations.   Eye exam due current.   Foot exam due yearly. Denies current concerns.   No Tobacco use.            Time Spent: 30 minutes  Encounter Type: Individual    Any diabetes medication dose changes were made via the CDE Protocol per the patient's primary care provider. A copy of this encounter was shared with the provider.     Reshma Sherman RN Diabetes Educator,  [phone]

## 2023-07-10 DIAGNOSIS — I10 BENIGN ESSENTIAL HYPERTENSION: ICD-10-CM

## 2023-07-11 RX ORDER — CHLORTHALIDONE 25 MG/1
TABLET ORAL
Qty: 90 TABLET | Refills: 0 | Status: SHIPPED | OUTPATIENT
Start: 2023-07-11 | End: 2023-10-10

## 2023-07-11 NOTE — TELEPHONE ENCOUNTER
[place]      Last Written Prescription Date:  4.20.23  Last Fill Quantity: #90,   # refills: 0  Last Office Visit: 3.6.23  Future Office visit:    Next 5 appointments (look out 90 days)    Sep 06, 2023  3:30 PM  (Arrive by 3:15 PM)  SHORT with LALY Burkett  [facility] [place] ([facility] [place] ) [address]  [place] [address]  [phone]           Routing refill request to provider for review/approval because:

## 2023-07-17 DIAGNOSIS — E11.65 TYPE 2 DIABETES MELLITUS WITH HYPERGLYCEMIA, WITHOUT LONG-TERM CURRENT USE OF INSULIN (H): ICD-10-CM

## 2023-07-19 RX ORDER — DULAGLUTIDE 1.5 MG/.5ML
INJECTION, SOLUTION SUBCUTANEOUS
Qty: 2 ML | Refills: 0 | Status: SHIPPED | OUTPATIENT
Start: 2023-07-19 | End: 2023-09-26

## 2023-07-19 NOTE — TELEPHONE ENCOUNTER
Trulicity      Last Written Prescription Date:  6.20.23  Last Fill Quantity: #2mL,   # refills: 0  Last Office Visit: 3.6.23  Future Office visit:    Next 5 appointments (look out 90 days)    Sep 06, 2023  3:30 PM  (Arrive by 3:15 PM)  SHORT with LALY Burkett  [facility] [place] ([facility] [place] ) [address]  [place] [address]  [phone]           Routing refill request to provider for review/approval because:

## 2023-07-28 DIAGNOSIS — E11.9 TYPE 2 DIABETES MELLITUS WITHOUT COMPLICATION, WITHOUT LONG-TERM CURRENT USE OF INSULIN (H): ICD-10-CM

## 2023-07-28 RX ORDER — EMPAGLIFLOZIN 10 MG/1
TABLET, FILM COATED ORAL
Qty: 90 TABLET | Refills: 1 | Status: SHIPPED | OUTPATIENT
Start: 2023-07-28 | End: 2023-09-26

## 2023-07-28 RX ORDER — BLOOD SUGAR DIAGNOSTIC
STRIP MISCELLANEOUS
Qty: 100 STRIP | Refills: 4 | Status: SHIPPED | OUTPATIENT
Start: 2023-07-28 | End: 2023-09-26

## 2023-07-28 NOTE — TELEPHONE ENCOUNTER
Jardiance  Last Written Prescription Date: 4/28/23  Last Fill Quantity: 90 # of Refills: 1  Last Office Visit: 3/6/23    Accu-Chek test strip  Last Written Prescription Date: 1/9/23  Last Fill Quantity: 100 # of Refills: 4  Last Office Visit: 3/6/23

## 2023-08-24 ENCOUNTER — ANCILLARY PROCEDURE (OUTPATIENT)
Dept: MAMMOGRAPHY | Facility: OTHER | Age: 53
End: 2023-08-24
Attending: PHYSICIAN ASSISTANT
Payer: COMMERCIAL

## 2023-08-24 DIAGNOSIS — Z12.31 VISIT FOR SCREENING MAMMOGRAM: ICD-10-CM

## 2023-08-24 PROCEDURE — 77067 SCR MAMMO BI INCL CAD: CPT | Mod: TC | Performed by: STUDENT IN AN ORGANIZED HEALTH CARE EDUCATION/TRAINING PROGRAM

## 2023-08-24 PROCEDURE — 77063 BREAST TOMOSYNTHESIS BI: CPT | Mod: TC | Performed by: STUDENT IN AN ORGANIZED HEALTH CARE EDUCATION/TRAINING PROGRAM

## 2023-08-25 ENCOUNTER — HOSPITAL ENCOUNTER (OUTPATIENT)
Dept: ULTRASOUND IMAGING | Facility: HOSPITAL | Age: 53
Discharge: HOME | End: 2023-08-25
Attending: PHYSICIAN ASSISTANT
Payer: COMMERCIAL

## 2023-08-25 ENCOUNTER — HOSPITAL ENCOUNTER (OUTPATIENT)
Dept: MAMMOGRAPHY | Facility: OTHER | Age: 53
Discharge: HOME | End: 2023-08-25
Attending: PHYSICIAN ASSISTANT
Payer: COMMERCIAL

## 2023-08-25 ENCOUNTER — TELEPHONE (OUTPATIENT)
Dept: MAMMOGRAPHY | Facility: OTHER | Age: 53
End: 2023-08-25

## 2023-08-25 DIAGNOSIS — R92.8 ABNORMAL SCREENING MAMMOGRAM: ICD-10-CM

## 2023-08-25 PROCEDURE — G0279 TOMOSYNTHESIS, MAMMO: HCPCS | Mod: TC | Performed by: STUDENT IN AN ORGANIZED HEALTH CARE EDUCATION/TRAINING PROGRAM

## 2023-08-25 PROCEDURE — 76642 ULTRASOUND BREAST LIMITED: CPT | Mod: RT

## 2023-08-25 PROCEDURE — 77065 DX MAMMO INCL CAD UNI: CPT | Mod: TC | Performed by: STUDENT IN AN ORGANIZED HEALTH CARE EDUCATION/TRAINING PROGRAM

## 2023-08-26 DIAGNOSIS — J30.1 SEASONAL ALLERGIC RHINITIS DUE TO POLLEN: ICD-10-CM

## 2023-08-28 RX ORDER — FLUTICASONE PROPIONATE 50 MCG
SPRAY, SUSPENSION (ML) NASAL
Qty: 48 G | Refills: 0 | Status: SHIPPED | OUTPATIENT
Start: 2023-08-28 | End: 2024-01-04

## 2023-08-28 NOTE — TELEPHONE ENCOUNTER
fluticasone (FLONASE) 50 MCG/ACT nasal spray 16 g 3 6/5/2023     Last Office Visit: 3/6/23     Future Office visit:    Next 5 appointments (look out 90 days)      Sep 06, 2023  3:30 PM  (Arrive by 3:15 PM)  SHORT with LALY Burkett  [facility] [place] ([facility] [place] ) [address]  [place] [address]  [phone]             Routing refill request to provider for review/approval because:

## 2023-08-31 DIAGNOSIS — I10 BENIGN ESSENTIAL HYPERTENSION: ICD-10-CM

## 2023-09-01 RX ORDER — METOPROLOL SUCCINATE 100 MG/1
TABLET, EXTENDED RELEASE ORAL
Qty: 90 TABLET | Refills: 1 | Status: SHIPPED | OUTPATIENT
Start: 2023-09-01 | End: 2024-01-04

## 2023-09-01 RX ORDER — METOPROLOL SUCCINATE 50 MG/1
TABLET, EXTENDED RELEASE ORAL
Qty: 90 TABLET | Refills: 1 | Status: SHIPPED | OUTPATIENT
Start: 2023-09-01 | End: 2024-01-04

## 2023-09-01 NOTE — TELEPHONE ENCOUNTER
METOPROLOL ER SUCCINATE 100MG TABS       Last Written Prescription Date:  3/6/23  Last Fill Quantity: 90,   # refills: 1  Last Office Visit: 3/6/23  Future Office visit:    Next 5 appointments (look out 90 days)      Sep 06, 2023  3:30 PM  (Arrive by 3:15 PM)  SHORT with LALY Burkett  [facility] [place] ([facility] [place] ) [address] [facility] [address]  [phone]              METOPROLOL ER SUCCINATE 50MG TABS       Last Written Prescription Date:  3/6/23  Last Fill Quantity: 90,   # refills: 1  Last Office Visit: 3/6/23  Future Office visit:    Next 5 appointments (look out 90 days)      Sep 06, 2023  3:30 PM  (Arrive by 3:15 PM)  SHORT with LALY Burkett  [facility] [place] ([facility] [place] ) [address]  [place] [address]  [phone]

## 2023-09-06 ENCOUNTER — OFFICE VISIT (OUTPATIENT)
Dept: FAMILY MEDICINE | Facility: OTHER | Age: 53
End: 2023-09-06
Attending: PHYSICIAN ASSISTANT
Payer: COMMERCIAL

## 2023-09-06 VITALS
WEIGHT: 182.2 LBS | DIASTOLIC BLOOD PRESSURE: 81 MMHG | TEMPERATURE: 97.8 F | HEART RATE: 83 BPM | OXYGEN SATURATION: 97 % | BODY MASS INDEX: 34.71 KG/M2 | SYSTOLIC BLOOD PRESSURE: 112 MMHG

## 2023-09-06 DIAGNOSIS — E03.9 ACQUIRED HYPOTHYROIDISM: ICD-10-CM

## 2023-09-06 DIAGNOSIS — E11.65 TYPE 2 DIABETES MELLITUS WITH HYPERGLYCEMIA, WITHOUT LONG-TERM CURRENT USE OF INSULIN (H): Primary | ICD-10-CM

## 2023-09-06 DIAGNOSIS — I10 BENIGN ESSENTIAL HYPERTENSION: ICD-10-CM

## 2023-09-06 LAB
BASOPHILS # BLD AUTO: 0.1 10E3/UL (ref 0–0.2)
BASOPHILS NFR BLD AUTO: 1 %
EOSINOPHIL # BLD AUTO: 0.4 10E3/UL (ref 0–0.7)
EOSINOPHIL NFR BLD AUTO: 3 %
ERYTHROCYTE [DISTWIDTH] IN BLOOD BY AUTOMATED COUNT: 11.9 % (ref 10–15)
HCT VFR BLD AUTO: 43.2 % (ref 35–47)
HGB BLD-MCNC: 15.1 G/DL (ref 11.7–15.7)
IMM GRANULOCYTES # BLD: 0 10E3/UL
IMM GRANULOCYTES NFR BLD: 0 %
LYMPHOCYTES # BLD AUTO: 3.9 10E3/UL (ref 0.8–5.3)
LYMPHOCYTES NFR BLD AUTO: 37 %
MCH RBC QN AUTO: 31.7 PG (ref 26.5–33)
MCHC RBC AUTO-ENTMCNC: 35 G/DL (ref 31.5–36.5)
MCV RBC AUTO: 91 FL (ref 78–100)
MONOCYTES # BLD AUTO: 0.8 10E3/UL (ref 0–1.3)
MONOCYTES NFR BLD AUTO: 8 %
NEUTROPHILS # BLD AUTO: 5.4 10E3/UL (ref 1.6–8.3)
NEUTROPHILS NFR BLD AUTO: 51 %
NRBC # BLD AUTO: 0 10E3/UL
NRBC BLD AUTO-RTO: 0 /100
PLATELET # BLD AUTO: 343 10E3/UL (ref 150–450)
RBC # BLD AUTO: 4.76 10E6/UL (ref 3.8–5.2)
WBC # BLD AUTO: 10.6 10E3/UL (ref 4–11)

## 2023-09-06 PROCEDURE — 99213 OFFICE O/P EST LOW 20 MIN: CPT | Performed by: PHYSICIAN ASSISTANT

## 2023-09-06 PROCEDURE — 36415 COLL VENOUS BLD VENIPUNCTURE: CPT | Performed by: PHYSICIAN ASSISTANT

## 2023-09-06 PROCEDURE — 85025 COMPLETE CBC W/AUTO DIFF WBC: CPT | Performed by: PHYSICIAN ASSISTANT

## 2023-09-06 ASSESSMENT — ASTHMA QUESTIONNAIRES
QUESTION_3 LAST FOUR WEEKS HOW OFTEN DID YOUR ASTHMA SYMPTOMS (WHEEZING, COUGHING, SHORTNESS OF BREATH, CHEST TIGHTNESS OR PAIN) WAKE YOU UP AT NIGHT OR EARLIER THAN USUAL IN THE MORNING: ONCE OR TWICE
QUESTION_1 LAST FOUR WEEKS HOW MUCH OF THE TIME DID YOUR ASTHMA KEEP YOU FROM GETTING AS MUCH DONE AT WORK, SCHOOL OR AT HOME: NONE OF THE TIME
QUESTION_4 LAST FOUR WEEKS HOW OFTEN HAVE YOU USED YOUR RESCUE INHALER OR NEBULIZER MEDICATION (SUCH AS ALBUTEROL): ONCE A WEEK OR LESS
ACT_TOTALSCORE: 21
QUESTION_2 LAST FOUR WEEKS HOW OFTEN HAVE YOU HAD SHORTNESS OF BREATH: ONCE OR TWICE A WEEK
QUESTION_5 LAST FOUR WEEKS HOW WOULD YOU RATE YOUR ASTHMA CONTROL: WELL CONTROLLED
ACT_TOTALSCORE: 21

## 2023-09-06 ASSESSMENT — PAIN SCALES - GENERAL: PAINLEVEL: NO PAIN (0)

## 2023-09-12 ENCOUNTER — TELEPHONE (OUTPATIENT)
Dept: FAMILY MEDICINE | Facility: OTHER | Age: 53
End: 2023-09-12

## 2023-09-12 NOTE — TELEPHONE ENCOUNTER
Reason for call:  Medication      Have you contacted your pharmacy? Yes   If patient has contacted Pharmacy and it has been over 72hrs, continue to #2  Medication Lisinopril 10mg   What [facility] do you use? [facility].      (Please note that the turn-around-time for prescriptions is 72 business hours; I am sending your request at this time. SEND TO  [facility]  )

## 2023-09-13 ENCOUNTER — MYC REFILL (OUTPATIENT)
Dept: FAMILY MEDICINE | Facility: OTHER | Age: 53
End: 2023-09-13

## 2023-09-13 DIAGNOSIS — I10 BENIGN ESSENTIAL HYPERTENSION: ICD-10-CM

## 2023-09-13 NOTE — TELEPHONE ENCOUNTER
Lisinopril 10mg      Last Written Prescription Date:  03/20/2023  Last Fill Quantity: 180,   # refills: 0  Last Office Visit: 09/06/2023  Future Office visit:       Routing refill request to provider for review/approval because:  Drug not on the [facility], [facility] or [facility] refill protocol or controlled substance

## 2023-09-15 ENCOUNTER — TELEPHONE (OUTPATIENT)
Dept: FAMILY MEDICINE | Facility: OTHER | Age: 53
End: 2023-09-15

## 2023-09-15 RX ORDER — LISINOPRIL 10 MG/1
10 TABLET ORAL 2 TIMES DAILY
Qty: 180 TABLET | Refills: 3 | Status: SHIPPED | OUTPATIENT
Start: 2023-09-15 | End: 2024-08-15

## 2023-09-15 NOTE — TELEPHONE ENCOUNTER
Reason for call:  Medication      Have you contacted your pharmacy? Yes   If patient has contacted Pharmacy and it has been over 72hrs, continue to #2  Medication Lisinopril 10mg 2x a day OUT OF MEDICATION NOW  What [facility] you use[facility] [place]      (Please note that the turn-around-time for prescriptions is 72 business hours; I am sending your request at this time. SEND TO  [facility]  )

## 2023-09-26 ENCOUNTER — HOSPITAL ENCOUNTER (OUTPATIENT)
Dept: EDUCATION SERVICES | Facility: HOSPITAL | Age: 53
Discharge: HOME | End: 2023-09-26
Attending: NURSE PRACTITIONER | Admitting: NURSE PRACTITIONER
Payer: COMMERCIAL

## 2023-09-26 VITALS
BODY MASS INDEX: 34.42 KG/M2 | DIASTOLIC BLOOD PRESSURE: 70 MMHG | HEART RATE: 82 BPM | OXYGEN SATURATION: 99 % | WEIGHT: 180.7 LBS | SYSTOLIC BLOOD PRESSURE: 111 MMHG

## 2023-09-26 DIAGNOSIS — E11.65 TYPE 2 DIABETES MELLITUS WITH HYPERGLYCEMIA, WITHOUT LONG-TERM CURRENT USE OF INSULIN (H): ICD-10-CM

## 2023-09-26 DIAGNOSIS — E11.9 TYPE 2 DIABETES MELLITUS WITHOUT COMPLICATION, WITHOUT LONG-TERM CURRENT USE OF INSULIN (H): ICD-10-CM

## 2023-09-26 LAB — HBA1C MFR BLD: 6.6 % (ref 4.3–?)

## 2023-09-26 PROCEDURE — 83036 HEMOGLOBIN GLYCOSYLATED A1C: CPT | Performed by: PHYSICIAN ASSISTANT

## 2023-09-26 RX ORDER — DULAGLUTIDE 1.5 MG/.5ML
1.5 INJECTION, SOLUTION SUBCUTANEOUS
Qty: 2 ML | Refills: 2 | Status: SHIPPED | OUTPATIENT
Start: 2023-09-26 | End: 2023-11-20

## 2023-09-26 RX ORDER — BLOOD SUGAR DIAGNOSTIC
STRIP MISCELLANEOUS
Qty: 100 STRIP | Refills: 3 | Status: SHIPPED | OUTPATIENT
Start: 2023-09-26 | End: 2024-04-09

## 2023-09-26 NOTE — PATIENT INSTRUCTIONS
Recap of our visit:     Monitoring:  Your A1C was 6.6 today. Goal is less than 7%   Previous A1C: 6.9   Next A1C: December     Check blood sugars 1 x/day.   Fasting in the morning or 2 hours after your largest meal are good times to check.     Target blood sugar: Fasting or before meals target is . 2 hours after meal <180.    We only need 1/2 of these numbers to be within target then your A1c will be within target.    Medications:   Jardiance 10 mg daily  Metformin 1000 mg once twice daily  Trulicity 1.5 mg once weekly.     Healthy Eating:  [email or web address]     Mediterranean Lifestyle: Fish/seafood 2 times a week, vegetables, fruit, nuts, legumes, whole grains, water, regular activity.    Eat a Healthy diet  Eat more vegetables/plants in your diet  Eat healthy fats  Olive oil  Avocados  Eat healthy proteins  Poultry without the skin  Fish  Limit red meat     Good snack examples: meat, cheese, cottage cheese, nuts, hard boiled egg, veggies, fruit/berries, yogurt (Greek yogurt/protein).     Target Carb:   Women 45 grams/meal 15-30 grams carbs optional snack.   Less if working towards weight loss.     Make sure you always have a protein with any all carb option      Activity/Exercise:   Try to walk or be active 5-10 minutes after eating any meal     Adult goal is 150 minutes/week =  30 minutes 5 days of the week.   Aerobic activity 150 minute a week  2 days of resistance exercising       Other:   Foot exam: yearly, due  Eye exam: yearly, current  Encourage regular dental check ups.      Great work ! Congratulations!    Follow up: 1/4/2024 at 3 pm for A1c/bg review      Please bring your meter/reader to your appointment.     If you have any questions or concerns, please call me at [phone] ([place]ia, Nurse directly) or send [email or web address] message.    Scheduling Number: [phone]    Thank you for coming in today!  Reshma Sherman RN Diabetes Educator

## 2023-09-26 NOTE — PROGRESS NOTES
Diabetes Self-Management Education & Support    Presents for: Individual review    Type of Service: In Person Visit    Assessment Type:   ASSESSMENT:  Trinidad, 52 year old,  returns to [facility] for A1c update.   Denies concerns with diabetes. Work stress, start of new school year and new computer programs.    Will plan for follow up after the holiday season- Trinidad is quick busy in December for next A1C update.       PCP: Linn RUFFIN    Insurance Coverage: [employer].      Monitoring:  A1C:  6.6  Target A1C: <7      Previous A1C: 6.9      BG Targets:   FBG 80- 130   PP <180     Meter Report: 2 week average: 136. 122-166. Testing once daily, all FBG.     Diabetes Medications:   Metformin 1000 mg twice daily. - max dose.   Truclicity 1.5 mg once weekly.  Max tolerated.   Jardiance 10 mg daily. Tolerating. No lightheadedness, dizzines. No UTI sx or yeast concerns.   Talked about possibility going up on dose- will wait to see next A1C in December.      Health Eating:   Meal planning for 2 in household currently.   Varies.     Activity:    About 3 days/week. Last week has been busy so not able to get out on break at work. Plans to get back into regular activityl     Reducing Risks:   /70  Statin use no, defer to PCP for recommendation.   ASA use yes  Tobacco use- no   Foot exam due, denies concerns.    Eye exam- current [facility].      Refills needed: yes- sent    Pharmacy: [facility]      Labs reviewed:  current labs on file.        Allergies   Allergen Reactions    Amoxicillin Trihydrate Itching     Augmentin      Clavulanic Acid Potassium Itching     Augmentin      Levaquin [Levofloxacin] Swelling    Penicillins Hives      Wt Readings from Last 10 Encounters:   09/26/23 82 kg (180 lb 11.2 oz)   09/06/23 82.6 kg (182 lb 3.2 oz)   06/22/23 85.3 kg (188 lb)   04/24/23 85.2 kg (187 lb 14.4 oz)   03/06/23 83 kg (183 lb)   01/30/23 85.8 kg (189 lb 3.2 oz)   01/09/23 87.5 kg (193 lb)   11/09/22  87.5 kg (193 lb)   10/28/22 87.9 kg (193 lb 11.2 oz)   10/20/22 87 kg (191 lb 12.8 oz)       Patient's most recent   Lab Results   Component Value Date    A1C 7.1 03/03/2023    A1C 7.1 08/27/2020    HEMOGLOBINA1 6.6 09/26/2023     is meeting goal of <7.0    Diabetes knowledge and skills assessment:   Patient is knowledgeable in diabetes management concepts related to: Healthy Eating, Monitoring, and Taking Medication  Continue education with the following diabetes management concepts: Healthy Eating, Being Active, Monitoring, Taking Medication, Problem Solving, Reducing Risks, and Healthy Coping  Based on learning assessment above, most appropriate setting for further diabetes education would be: Individual setting.      PLAN  Monitoring:  Your A1C was 6.6 today. Goal is less than 7%   Previous A1C: 6.9   Next A1C: December     Check blood sugars 1 x/day.   Fasting in the morning or 2 hours after your largest meal are good times to check.     Target blood sugar: Fasting or before meals target is . 2 hours after meal <180.    We only need 1/2 of these numbers to be within target then your A1c will be within target.    Medications:   Jardiance 10 mg daily  Metformin 1000 mg once twice daily  Trulicity 1.5 mg once weekly.     Healthy Eating:  [email or web address]     Mediterranean Lifestyle: Fish/seafood 2 times a week, vegetables, fruit, nuts, legumes, whole grains, water, regular activity.    Eat a Healthy diet  Eat more vegetables/plants in your diet  Eat healthy fats  Olive oil  Avocados  Eat healthy proteins  Poultry without the skin  Fish  Limit red meat     Good snack examples: meat, cheese, cottage cheese, nuts, hard boiled egg, veggies, fruit/berries, yogurt (Greek yogurt/protein).     Target Carb:   Women 45 grams/meal 15-30 grams carbs optional snack.   Less if working towards weight loss.     Make sure you always have a protein with any all carb option      Activity/Exercise:   Try to walk or be active  5-10 minutes after eating any meal     Adult goal is 150 minutes/week =  30 minutes 5 days of the week.   Aerobic activity 150 minute a week  2 days of resistance exercising       Other:   Foot exam: yearly, due  Eye exam: yearly, current  Encourage regular dental check ups.      Great work ! Congratulations!    Follow up: 1/4/2024 at 3 pm for A1c/bg review      Please bring your meter/reader to your appointment.     If you have any questions or concerns, please call me at [phone] (Alicia, Nurse directly) or send [email or web address] message.    Scheduling Number: [phone]    See Care Plan for co-developed, patient-state behavior change goals.  AVS provided for patient today.    Education Materials Provided:  No new materials provided today      SUBJECTIVE/OBJECTIVE:  Presents for: Individual review  Accompanied by: Self  Diabetes education in the past 24mo: Yes  Focus of Visit: Monitoring, Taking Medication, Diabetes Pathophysiology, Healthy Eating, Being Active  Diabetes type: Type 2, Secondary Diabetes  Disease course: Improving  How confident are you filling out medical forms by yourself:: Extremely  Diabetes management related comments/concerns: a1c update  Transportation concerns: No  Difficulty affording diabetes medication?: No  Difficulty affording diabetes testing supplies?: No  Other concerns:: None  Cultural Influences/Ethnic Background:  Not  or     Diabetes Symptoms & Complications:  Fatigue: No  Neuropathy: Sometimes  Polydipsia: No  Polyphagia: No  Polyuria: Yes (Jardiance)  Visual change: No  Slow healing wounds: No  Symptom course: Stable  Weight trend: Decreasing  Complications assessed today?: Yes  Autonomic neuropathy: No  CVA: No  Heart disease: No  Nephropathy: No  Peripheral neuropathy: Other  Foot ulcerations: No  Peripheral Vascular Disease: No  Retinopathy: No  Sexual dysfunction: No    Patient Problem List and Family Medical History reviewed for relevant medical history, current  "medical status, and diabetes risk factors.    Vitals:  /70   Pulse 82   Wt 82 kg (180 lb 11.2 oz)   LMP 03/07/2015 (Exact Date)   SpO2 99%   BMI 34.42 kg/m    Estimated body mass index is 34.42 kg/m  as calculated from the following:    Height as of 6/22/23: 1.543 m (5' 0.75\").    Weight as of this encounter: 82 kg (180 lb 11.2 oz).   Last 3 BP:   BP Readings from Last 3 Encounters:   09/26/23 111/70   09/06/23 112/81   06/22/23 122/74       History   Smoking Status    Never   Smokeless Tobacco    Never       Labs:  Lab Results   Component Value Date    A1C 7.1 03/03/2023    A1C 7.1 08/27/2020    HEMOGLOBINA1 6.6 09/26/2023     Lab Results   Component Value Date     03/03/2023     04/13/2022     06/25/2018     Lab Results   Component Value Date    LDL 52 03/03/2023    LDL 78 04/08/2019     HDL Cholesterol   Date Value Ref Range Status   04/08/2019 73 >49 mg/dL Final     Direct Measure HDL   Date Value Ref Range Status   03/03/2023 54 >=50 mg/dL Final   ]  GFR Estimate   Date Value Ref Range Status   03/03/2023 61 >60 mL/min/1.73m2 Final     Comment:     eGFR calculated using 2021 CKD-EPI equation.   06/03/2019 >90 >60 mL/min/[1.73_m2] Final     GFR Estimate If Black   Date Value Ref Range Status   06/03/2019 >90 >60 mL/min/[1.73_m2] Final     Lab Results   Component Value Date    CR 1.09 03/03/2023    CR 0.65 06/25/2018     No results found for: MICROALBUMIN    Healthy Eating:  Healthy Eating Assessed Today: Yes  Cultural/Religion diet restrictions?: No  Meal planning/habits: Carb counting, Low carb, Low salt, Smaller portions, Frequent snacking  How many times a week on average do you eat food made away from home (restaurant/take-out)?: 1  Meals include: Breakfast, Dinner, Afternoon Snack  Breakfast: cereal and/or toast.  Dinner: chicken or ham or turkey. steak occassional. mashed potato, salad or veg.  Beverages: Water, Tea, Milk    Being Active:  Being Active Assessed Today: " Yes  Exercise:: Yes (2-3 times/week.)  Days per week of moderate to strenuous exercise (like a brisk walk): 3  On average, minutes per day of exercise at this level: 20  How intense was your typical exercise? : Moderate (like brisk walking)  Exercise Minutes per Week: 60  Barrier to exercise: Time    Monitoring:  Monitoring Assessed Today: Yes  Did patient bring glucose meter to appointment? : Yes  Blood Glucose Meter: Accu-chek  Times checking blood sugar at home (number): 1  Times checking blood sugar at home (per): Day  Blood glucose trend: Decreasing    Taking Medications:  Diabetes Medication(s)       Biguanides       metFORMIN (GLUCOPHAGE) 1000 MG tablet    TAKE 1 TABLET(1000 MG) BY MOUTH TWICE DAILY WITH MEALS      Sodium-Glucose Co-Transporter 2 (SGLT2) Inhibitors       empagliflozin (JARDIANCE) 10 MG TABS tablet    Take 1 tablet (10 mg) by mouth daily      Incretin Mimetic Agents       dulaglutide (TRULICITY) 1.5 MG/0.5ML pen    Inject 1.5 mg Subcutaneous every 7 days            Current Treatments: Diet, Non-insulin Injectables, Oral Medication (taken by mouth)  Problems taking diabetes medications regularly?: No  Diabetes medication side effects?: No    Problem Solving:  Problem Solving Assessed Today: Yes  Is the patient at risk for hypoglycemia?: No  Hypoglycemia Frequency: Never  Patient carries a carbohydrate source: Yes              Reducing Risks:  Reducing Risks Assessed Today: Yes  Diabetes Risks: Age over 45 years  CAD Risks: Diabetes Mellitus, Dyslipidemia, Family history, Hypertension, Obesity, Sedentary lifestyle, Stress  Has dilated eye exam at least once a year?: Yes  Sees dentist every 6 months?: Yes  Feet checked by healthcare provider in the last year?: No    Healthy Coping:  Healthy Coping Assessed Today: Yes  Emotional response to diabetes: Acceptance, Ready to learn, Confidence diabetes can be controlled, Concern for health and well-being  Informal Support system:: Children, Family,  Spouse  Stage of change: ACTION (Actively working towards change)  Patient Activation Measure Survey Score:      12/19/2018    10:00 AM 11/9/2022     3:59 PM   JAIME Score (Last Two)   JAIME Raw Score 40 33   Activation Score 100 65.8   JAIME Level 4 3         Care Plan and Education Provided:  Care Plan: Diabetes   Updates made by Reshma Sherman, RN since 9/27/2023 12:00 AM        Problem: Diabetes Self-Management Education Needed to Optimize Self-Care Behaviors         Goal: Being Active - get regular physical activity, working up to at least 150 minutes per week    This Visit's Progress: On track   Recent Progress: 80%   Note:    Increase activity as tolerated. Adult target 150 minutes/week.   Start small and increase time and frequency.        Goal: Taking Medication - patient is consistently taking medications as directed    This Visit's Progress: 100%   Recent Progress: 100%   Note:    Takes medication as prescribed. No missed doses.   Metformin  Trulicity  Jardiance.     Tolerating, no side effects on current plan.           Time Spent: 30 minutes  Encounter Type: Individual    Any diabetes medication dose changes were made via the CDE Protocol per the patient's primary care provider. A copy of this encounter was shared with the provider.    Reshma Sherman, RN Diabetes Educator,  [phone]  9/27/2023 at 12:06 PM

## 2023-09-26 NOTE — TELEPHONE ENCOUNTER
Metformin 1000 mg      Last Written Prescription Date:  06/22/2023  Last Fill Quantity: 180,   # refills: 0  Last Office Visit: 09/06/2023  Future Office visit:       Routing refill request to provider for review/approval because:  Drug not on the [facility], P or [facility] refill protocol or controlled substance

## 2023-10-09 DIAGNOSIS — I10 BENIGN ESSENTIAL HYPERTENSION: ICD-10-CM

## 2023-10-10 RX ORDER — CHLORTHALIDONE 25 MG/1
TABLET ORAL
Qty: 90 TABLET | Refills: 0 | Status: SHIPPED | OUTPATIENT
Start: 2023-10-10 | End: 2024-01-04

## 2023-10-10 NOTE — TELEPHONE ENCOUNTER
Chlorthalidone      Last Written Prescription Date:  7/11/23  Last Fill Quantity: 90,   # refills: 0  Last Office Visit: 9/6/23  Future Office visit:       Routing refill request to provider for review/approval because:

## 2023-10-18 ENCOUNTER — ALLIED HEALTH/NURSE VISIT (OUTPATIENT)
Dept: FAMILY MEDICINE | Facility: OTHER | Age: 53
End: 2023-10-18
Attending: PHYSICIAN ASSISTANT
Payer: COMMERCIAL

## 2023-10-18 DIAGNOSIS — Z09 NEED FOR IMMUNIZATION FOLLOW-UP: Primary | ICD-10-CM

## 2023-10-18 PROCEDURE — 90686 IIV4 VACC NO PRSV 0.5 ML IM: CPT

## 2023-10-18 PROCEDURE — 90471 IMMUNIZATION ADMIN: CPT

## 2023-10-30 NOTE — TELEPHONE ENCOUNTER
Iron was low other labs great   chlorthalidone (HYGROTON) 25 MG tablet 30 tablet 3 8/24/2022     lov 11/09/22

## 2023-11-09 DIAGNOSIS — E11.9 TYPE 2 DIABETES MELLITUS WITHOUT COMPLICATION, WITHOUT LONG-TERM CURRENT USE OF INSULIN (H): ICD-10-CM

## 2023-11-09 RX ORDER — LANCETS
EACH MISCELLANEOUS
Qty: 100 EACH | Refills: 3 | Status: SHIPPED | OUTPATIENT
Start: 2023-11-09 | End: 2024-04-09

## 2023-11-20 DIAGNOSIS — E11.65 TYPE 2 DIABETES MELLITUS WITH HYPERGLYCEMIA, WITHOUT LONG-TERM CURRENT USE OF INSULIN (H): ICD-10-CM

## 2023-11-21 RX ORDER — DULAGLUTIDE 1.5 MG/.5ML
1.5 INJECTION, SOLUTION SUBCUTANEOUS
Qty: 2 ML | Refills: 0 | Status: SHIPPED | OUTPATIENT
Start: 2023-11-21 | End: 2024-01-04

## 2023-11-21 NOTE — TELEPHONE ENCOUNTER
Trulicity      Last Written Prescription Date:  9/26/23  Last Fill Quantity: 2,   # refills: 2  Last Office Visit: 9/6/23  Future Office visit:

## 2023-12-13 ENCOUNTER — TELEPHONE (OUTPATIENT)
Dept: FAMILY MEDICINE | Facility: OTHER | Age: 53
End: 2023-12-13

## 2023-12-13 NOTE — TELEPHONE ENCOUNTER
Reason for call:  Medication      Have you contacted your pharmacy? Yes, on Monday 12/11/23 patient is upset that Mallorie fax or e-prescribing didn't go through to our computer system. She will be out of this medication on Friday 12/15/23 If patient has contacted Pharmacy and it has been over 72hrs, continue to #2  Medication:  Amlpipine  What Pharmacy do you use? [facility] [place]      (Please note that the turn-around-time for prescriptions is 72 business hours; I am sending your request at this time. SEND TO  [facility]  )

## 2024-01-04 ENCOUNTER — HOSPITAL ENCOUNTER (OUTPATIENT)
Dept: EDUCATION SERVICES | Facility: HOSPITAL | Age: 54
Discharge: HOME | End: 2024-01-04
Attending: PHYSICIAN ASSISTANT | Admitting: PHYSICIAN ASSISTANT
Payer: COMMERCIAL

## 2024-01-04 VITALS
SYSTOLIC BLOOD PRESSURE: 107 MMHG | HEART RATE: 82 BPM | BODY MASS INDEX: 32.98 KG/M2 | HEIGHT: 61 IN | DIASTOLIC BLOOD PRESSURE: 70 MMHG | RESPIRATION RATE: 16 BRPM | OXYGEN SATURATION: 97 % | WEIGHT: 174.7 LBS

## 2024-01-04 DIAGNOSIS — J30.1 SEASONAL ALLERGIC RHINITIS DUE TO POLLEN: ICD-10-CM

## 2024-01-04 DIAGNOSIS — E11.65 TYPE 2 DIABETES MELLITUS WITH HYPERGLYCEMIA, WITHOUT LONG-TERM CURRENT USE OF INSULIN (H): ICD-10-CM

## 2024-01-04 DIAGNOSIS — I10 BENIGN ESSENTIAL HYPERTENSION: ICD-10-CM

## 2024-01-04 DIAGNOSIS — E11.9 TYPE 2 DIABETES MELLITUS WITHOUT COMPLICATION, WITHOUT LONG-TERM CURRENT USE OF INSULIN (H): Primary | ICD-10-CM

## 2024-01-04 LAB — HBA1C MFR BLD: 6.6 % (ref 4.3–?)

## 2024-01-04 PROCEDURE — 83036 HEMOGLOBIN GLYCOSYLATED A1C: CPT | Performed by: PHYSICIAN ASSISTANT

## 2024-01-04 PROCEDURE — G0108 DIAB MANAGE TRN  PER INDIV: HCPCS

## 2024-01-04 RX ORDER — DULAGLUTIDE 1.5 MG/.5ML
1.5 INJECTION, SOLUTION SUBCUTANEOUS
Qty: 6 ML | Refills: 1 | Status: SHIPPED | OUTPATIENT
Start: 2024-01-04 | End: 2024-07-23

## 2024-01-04 RX ORDER — DULAGLUTIDE 1.5 MG/.5ML
1.5 INJECTION, SOLUTION SUBCUTANEOUS
Qty: 2 ML | Refills: 0 | Status: CANCELLED | OUTPATIENT
Start: 2024-01-04

## 2024-01-04 ASSESSMENT — PAIN SCALES - GENERAL: PAINLEVEL: NO PAIN (0)

## 2024-01-04 NOTE — PATIENT INSTRUCTIONS
Recap of our visit:     Monitoring:  Your A1C was 6.6 . Goal is less than <7.0   Previous A1C: 6.6   Next A1C: April 5th or later    Check blood sugars  x/day.   Fasting in the morning or 2 hours after your largest meal are good times to check.    Target blood sugar: Fasting or before meals target is . 2 hours after meal <180.    We only need 1/2 of these numbers to be within target then your A1c will be within target.    Medications:   Increase Jardiance 25 mg daily (okay to take two 10 mg once daily to use current supply).   Metformin 1000 mg twice daily  Trulicity 1.5 mg weekly.      Healthy Eating:      [email or web address]     Mediterranean Lifestyle: Fish/seafood 2 times a week, vegetables, fruit, nuts, legumes, whole grains, water, regular activity.    Eat a Healthy diet  Eat more vegetables/plants in your diet  Eat healthy fats  Olive oil  Avocados  Eat healthy proteins  Poultry without the skin  Fish  Limit red meat     Limit higher carbohydrate foods such as: rice, breads, cereal, pasta, sweets.   Avoid sugary drinks: regular soda/pop, juice, sports drinks. (Sugar free, zero are okay).     Snacks: Try to  work on healthy, low carbohydrate food choices.   Good snack examples: meat, cheese, cottage cheese, nuts, hard boiled egg, veggies, fruit/berries, yogurt (Greek yogurt/protein).     Target Carb:    Women 45 grams/meal 15-30 grams carbs optional snack.   Less if working towards weight loss.     Make sure you have a protein with carb option      Activity/Exercise:       Any prolonged sedentary/sitting activity should be interrupted every 30 minutes   Try to walk or be active 5-10 minutes after eating meals     Continue working on healthy eating and moving (start low and slow, work up to 30 min, 5x/week).   Increase exercise as tolerated, even multiple short times during your day helps.    Adult goal is 150 minutes/week =  30 minutes 5 days of the week.   Aerobic activity 150 minute a week  2 days of  resistance exercising      Healthy Coping:     Practicing health promotion can help improve diabetes (suggested by the ADA, March 2019)              Meditation                          Apps: for IPhone and Android                          -Calm                          -Headspace                          -Insight Timer              Yoga              Mindful eating         Other:   Foot exam: yearly  Eye exam: yearly  Encourage regular dental check ups.    Follow up: 4/9/2024 at 3 pm for A1c   Please bring your meter/reader to your appointment.     If you have any questions or concerns, please call me at [phone] (Alicia Nurse directly) or send [employer] message.    Scheduling Number: [phone]    Thank you for coming in today!  Reshma Sherman RN Diabetes Educator

## 2024-01-04 NOTE — PROGRESS NOTES
Diabetes Self-Management Education & Support    Presents for: Follow-up    Type of Service: In Person Visit    ASSESSMENT:  Trinidad, 53 year old, returns to [facility] for A1c update.    PCP: Linn Ortiz.    Insurance Coverage: [employer]      Monitoring:  A1C:  6.6      Target A1C: <7      Previous A1C: 6.6     BG Targets:   FBG    PP  <180     Meter Report: average 131 [id number]     Diabetes Medications:    Trulicity 1.5 weekly did not tolerate higher dose.   Metformin 1000 mg twice daily  Jardiance 10 mg daily     Discussed options: continue current plan- she is stable. Trinidad would like to  increase Jardiance, she would like to see her A1C a little lower. She is <7 at 6.6, stable.  Reviewed s/s to watch for, notify DRC or PCP.     Health Eating:   Eating less, not as hungry.   Trulicity   Holidays, getting back into usual routine.     Activity:    Trip to [place], lots of walking/site seeing.     Reducing Risks:   /70  Statin use no, defer to pcp  ASA use yes  Tobacco use- no   Foot exam due, denies concerns.    Eye exam- current  Location [facility]     Refills needed: sent: Trulicity, Metformin      Allergies   Allergen Reactions    Amoxicillin Trihydrate Itching     Augmentin      Clavulanic Acid Potassium Itching     Augmentin      Levaquin [Levofloxacin] Swelling    Penicillins Hives      Wt Readings from Last 10 Encounters:   01/04/24 79.2 kg (174 lb 11.2 oz)   09/26/23 82 kg (180 lb 11.2 oz)   09/06/23 82.6 kg (182 lb 3.2 oz)   06/22/23 85.3 kg (188 lb)   04/24/23 85.2 kg (187 lb 14.4 oz)   03/06/23 83 kg (183 lb)   01/30/23 85.8 kg (189 lb 3.2 oz)   01/09/23 87.5 kg (193 lb)   11/09/22 87.5 kg (193 lb)   10/28/22 87.9 kg (193 lb 11.2 oz)       Patient's most recent   Lab Results   Component Value Date    A1C 7.1 03/03/2023    A1C 7.1 08/27/2020    HEMOGLOBINA1 6.6 01/04/2024     is meeting goal of <7.0    Diabetes knowledge and skills assessment:   Patient is knowledgeable in diabetes management  concepts related to: Healthy Eating, Being Active, Monitoring, Taking Medication, and Reducing Risks  Continue education with the following diabetes management concepts: continued support with self care/maintenance.  Based on learning assessment above, most appropriate setting for further diabetes education would be: Individual setting.      PLAN  Monitoring:  Your A1C was 6.6 . Goal is less than <7.0   Previous A1C: 6.6   Next A1C: April 5th or later    Check blood sugars  x/day.   Fasting in the morning or 2 hours after your largest meal are good times to check.    Target blood sugar: Fasting or before meals target is . 2 hours after meal <180.    We only need 1/2 of these numbers to be within target then your A1c will be within target.    Medications:   Increase Jardiance 25 mg daily (okay to take two 10 mg once daily to use current supply).   Metformin 1000 mg twice daily  Trulicity 1.5 mg weekly.      Healthy Eating:      diabetesfoodhub.org     Mediterranean Lifestyle: Fish/seafood 2 times a week, vegetables, fruit, nuts, legumes, whole grains, water, regular activity.    Eat a Healthy diet  Eat more vegetables/plants in your diet  Eat healthy fats  Olive oil  Avocados  Eat healthy proteins  Poultry without the skin  Fish  Limit red meat     Limit higher carbohydrate foods such as: rice, breads, cereal, pasta, sweets.   Avoid sugary drinks: regular soda/pop, juice, sports drinks. (Sugar free, zero are okay).     Snacks: Try to  work on healthy, low carbohydrate food choices.   Good snack examples: meat, cheese, cottage cheese, nuts, hard boiled egg, veggies, fruit/berries, yogurt (Greek yogurt/protein).     Target Carb:    Women 45 grams/meal 15-30 grams carbs optional snack.   Less if working towards weight loss.     Make sure you have a protein with carb option      Activity/Exercise:       Any prolonged sedentary/sitting activity should be interrupted every 30 minutes   Try to walk or be active 5-10  minutes after eating meals     Continue working on healthy eating and moving (start low and slow, work up to 30 min, 5x/week).   Increase exercise as tolerated, even multiple short times during your day helps.    Adult goal is 150 minutes/week =  30 minutes 5 days of the week.   Aerobic activity 150 minute a week  2 days of resistance exercising      Healthy Coping:     Practicing health promotion can help improve diabetes (suggested by the ADA, March 2019)              Meditation                          Apps: for IPhone and Android                          -Calm                          -Headspace                          -Insight Timer              Yoga              Mindful eating       Other:   Foot exam: yearly  Eye exam: yearly  Encourage regular dental check ups.    Follow up: 4/9/2024 at 3 pm for A1c   Please bring your meter/reader to your appointment.     If you have any questions or concerns, please call me at [phone] (Alicia, Nurse directly) or send [employer] message.    Scheduling Number: [phone]      See Care Plan for co-developed, patient-state behavior change goals.  AVS provided for patient today.    Education Materials Provided:  No new materials provided today      SUBJECTIVE/OBJECTIVE:  Presents for: Follow-up  Accompanied by: Self  Diabetes education in the past 24mo: Yes  Focus of Visit: Taking Medication, Monitoring, Diabetes Pathophysiology, Healthy Eating  Diabetes type: Type 2  Disease course: Stable  How confident are you filling out medical forms by yourself:: Extremely  Diabetes management related comments/concerns: would like A1C a little lower.  Transportation concerns: No  Difficulty affording diabetes medication?: No  Difficulty affording diabetes testing supplies?: No  Other concerns:: None  Cultural Influences/Ethnic Background:  Not  or     Diabetes Symptoms & Complications:  Fatigue: No  Neuropathy: No  Polydipsia: No  Polyphagia: No  Polyuria: No  Visual  "change: No  Slow healing wounds: No  Symptom course: Stable  Weight trend: Decreasing  Complications assessed today?: Yes  Autonomic neuropathy: No  CVA: No  Heart disease: No  Nephropathy: No  Peripheral neuropathy: No  Foot ulcerations: No  Peripheral Vascular Disease: No  Retinopathy: No  Sexual dysfunction: No    Patient Problem List and Family Medical History reviewed for relevant medical history, current medical status, and diabetes risk factors.    Vitals:  /70   Pulse 82   Resp 16   Ht 1.543 m (5' 0.75\")   Wt 79.2 kg (174 lb 11.2 oz)   LMP 03/07/2015 (Exact Date)   SpO2 97%   BMI 33.28 kg/m    Estimated body mass index is 33.28 kg/m  as calculated from the following:    Height as of this encounter: 1.543 m (5' 0.75\").    Weight as of this encounter: 79.2 kg (174 lb 11.2 oz).   Last 3 BP:   BP Readings from Last 3 Encounters:   01/04/24 107/70   09/26/23 111/70   09/06/23 112/81       History   Smoking Status    Never   Smokeless Tobacco    Never       Labs:  Lab Results   Component Value Date    A1C 7.1 03/03/2023    A1C 7.1 08/27/2020    HEMOGLOBINA1 6.6 01/04/2024     Lab Results   Component Value Date     03/03/2023     04/13/2022     06/25/2018     Lab Results   Component Value Date    LDL 52 03/03/2023    LDL 78 04/08/2019     HDL Cholesterol   Date Value Ref Range Status   04/08/2019 73 >49 mg/dL Final     Direct Measure HDL   Date Value Ref Range Status   03/03/2023 54 >=50 mg/dL Final   ]  GFR Estimate   Date Value Ref Range Status   03/03/2023 61 >60 mL/min/1.73m2 Final     Comment:     eGFR calculated using 2021 CKD-EPI equation.   06/03/2019 >90 >60 mL/min/[1.73_m2] Final     GFR Estimate If Black   Date Value Ref Range Status   06/03/2019 >90 >60 mL/min/[1.73_m2] Final     Lab Results   Component Value Date    CR 1.09 03/03/2023    CR 0.65 06/25/2018     No results found for: \"MICROALBUMIN\"    Healthy Eating:  Healthy Eating Assessed Today: " Yes  Cultural/Judaism diet restrictions?: No  Meal planning/habits: Carb counting, Low carb, Smaller portions  How many times a week on average do you eat food made away from home (restaurant/take-out)?: 1  Meals include: Breakfast, Dinner, Afternoon Snack  Breakfast: cereal. lately english muffin or bagel.  Lunch: trying to eat but mostly skip. drinking hot teas.  Dinner: ham or chicken. tacos this evening. [employer] last night. pizza.  Beverages: Water, Milk, Juice, Diet soda    Being Active:  Being Active Assessed Today: Yes  Exercise:: Yes  Days per week of moderate to strenuous exercise (like a brisk walk): 2  On average, minutes per day of exercise at this level: 30  How intense was your typical exercise? : Light (like stretching or slow walking)  Exercise Minutes per Week: 60  Barrier to exercise: Time    Monitoring:  Monitoring Assessed Today: Yes  Did patient bring glucose meter to appointment? : Yes  Blood Glucose Meter: One Touch  Times checking blood sugar at home (number): 1  Times checking blood sugar at home (per): Day  Blood glucose trend: Decreasing    Taking Medications:  Diabetes Medication(s)       Biguanides       metFORMIN (GLUCOPHAGE) 1000 MG tablet TAKE 1 TABLET(1000 MG) BY MOUTH TWICE DAILY WITH MEALS       Sodium-Glucose Co-Transporter 2 (SGLT2) Inhibitors       empagliflozin (JARDIANCE) 25 MG TABS tablet Take 1 tablet (25 mg) by mouth daily       Incretin Mimetic Agents       dulaglutide (TRULICITY) 1.5 MG/0.5ML pen Inject 1.5 mg Subcutaneous every 7 days            Taking Medication Assessed Today: Yes  Current Treatments: Non-insulin Injectables, Oral Medication (taken by mouth), Diet (Trulicity 1.5 mg weekly, Metformin 1000 mg twice dialy. Jardiance 10 mg daily.)  Problems taking diabetes medications regularly?: No  Diabetes medication side effects?: No    Problem Solving:  Problem Solving Assessed Today: Yes              Reducing Risks:  Reducing Risks Assessed Today: Yes  Diabetes  Risks: Age over 45 years  CAD Risks: Diabetes Mellitus, Family history, Hypertension, Obesity  Has dilated eye exam at least once a year?: Yes  Sees dentist every 6 months?: Yes  Feet checked by healthcare provider in the last year?: No    Healthy Coping:  Healthy Coping Assessed Today: Yes  Emotional response to diabetes: Ready to learn, Acceptance, Concern for health and well-being  Informal Support system:: Children, Family, Friends, Parent, Spouse  Stage of change: MAINTENANCE (Working to maintain change, with risk of relapse)  Patient Activation Measure Survey Score:      12/19/2018    10:00 AM 11/9/2022     3:59 PM   JAIME Score (Last Two)   JAIME Raw Score 40 33   Activation Score 100 65.8   JAIME Level 4 3         Care Plan and Education Provided:  Care Plan: Diabetes   Updates made by Reshma Sherman RN since 1/5/2024 12:00 AM        Problem: Diabetes Self-Management Education Needed to Optimize Self-Care Behaviors         Goal: Healthy Eating - follow a healthy eating pattern for diabetes    This Visit's Progress: 100%   Recent Progress: 100%   Note:    Reviewed dietary recommendations 30-45 grams/meals/carb. 15-30 grams/snacks.        Goal: Being Active - get regular physical activity, working up to at least 150 minutes per week    This Visit's Progress: On track   Recent Progress: On track   Note:    Increase activity as tolerated. Adult target 150 minutes/week.   Start small and increase time and frequency.        Goal: Monitoring - monitor glucose and ketones as directed    This Visit's Progress: 100%   Recent Progress: 100%   Note:    Tests daily. Mostly fasting, trying to remember to check post meals on occasion.        Goal: Reducing Risks - know how to prevent and treat long-term diabetes complications    This Visit's Progress: 90%   Recent Progress: 90%   Note:    ADA BP target <130/80    A1C <7.0- meeting target.   Statin use no, defer to pcp. ASA- yes  Eye exam due   Foot exam due yearly.   No Tobacco  use.         Time Spent: 30 minutes  Encounter Type: Individual    Any diabetes medication dose changes were made via the CDE Protocol per the patient's primary care provider. A copy of this encounter was shared with the provider.  Reshma Sherman RN Diabetes Educator,  [phone]

## 2024-01-04 NOTE — TELEPHONE ENCOUNTER
Chlorthalidone      Last Written Prescription Date:  10/10/23  Last Fill Quantity: 90,   # refills: 0  Last Office Visit: 09/06/23  Future Office visit:       Flonase      Last Written Prescription Date:  08/28/23  Last Fill Quantity: 48g,   # refills: 0  Last Office Visit: 09/06/23  Future Office visit:         Toprol 100mg      Last Written Prescription Date:  09/01/23  Last Fill Quantity: 90,   # refills: 1  Last Office Visit: 09/06/23  Future Office visit:         Toprol 50mg      Last Written Prescription Date:  09/01/23  Last Fill Quantity: 90,   # refills: 1  Last Office Visit: 09/06/23  Future Office visit:

## 2024-01-05 RX ORDER — METOPROLOL SUCCINATE 100 MG/1
TABLET, EXTENDED RELEASE ORAL
Qty: 90 TABLET | Refills: 1 | Status: SHIPPED | OUTPATIENT
Start: 2024-01-05 | End: 2024-08-15

## 2024-01-05 RX ORDER — METOPROLOL SUCCINATE 50 MG/1
TABLET, EXTENDED RELEASE ORAL
Qty: 90 TABLET | Refills: 1 | Status: SHIPPED | OUTPATIENT
Start: 2024-01-05 | End: 2024-08-15

## 2024-01-05 RX ORDER — FLUTICASONE PROPIONATE 50 MCG
SPRAY, SUSPENSION (ML) NASAL
Qty: 48 G | Refills: 1 | Status: SHIPPED | OUTPATIENT
Start: 2024-01-05 | End: 2024-07-01

## 2024-01-07 RX ORDER — CHLORTHALIDONE 25 MG/1
25 TABLET ORAL DAILY
Qty: 90 TABLET | Refills: 0 | Status: SHIPPED | OUTPATIENT
Start: 2024-01-07 | End: 2024-04-02

## 2024-03-07 DIAGNOSIS — E11.9 TYPE 2 DIABETES MELLITUS WITHOUT COMPLICATION, WITHOUT LONG-TERM CURRENT USE OF INSULIN (H): ICD-10-CM

## 2024-03-27 ENCOUNTER — TELEPHONE (OUTPATIENT)
Dept: FAMILY MEDICINE | Facility: OTHER | Age: 54
End: 2024-03-27

## 2024-03-27 DIAGNOSIS — E11.9 TYPE 2 DIABETES MELLITUS WITHOUT COMPLICATION, WITHOUT LONG-TERM CURRENT USE OF INSULIN (H): Primary | ICD-10-CM

## 2024-04-02 DIAGNOSIS — I10 BENIGN ESSENTIAL HYPERTENSION: ICD-10-CM

## 2024-04-02 RX ORDER — CHLORTHALIDONE 25 MG/1
25 TABLET ORAL DAILY
Qty: 90 TABLET | Refills: 0 | Status: SHIPPED | OUTPATIENT
Start: 2024-04-02 | End: 2024-07-01

## 2024-04-02 NOTE — TELEPHONE ENCOUNTER
Chlorthalidone      Last Written Prescription Date:  1/7/24  Last Fill Quantity: 90,   # refills: 0  Last Office Visit: 9/6/23  Future Office visit:       Routing refill request to provider for review/approval because:

## 2024-04-04 DIAGNOSIS — I10 BENIGN ESSENTIAL HYPERTENSION: ICD-10-CM

## 2024-04-04 RX ORDER — CHLORTHALIDONE 25 MG/1
25 TABLET ORAL DAILY
Qty: 90 TABLET | Refills: 0 | OUTPATIENT
Start: 2024-04-04

## 2024-04-06 DIAGNOSIS — E11.9 TYPE 2 DIABETES MELLITUS WITHOUT COMPLICATION, WITHOUT LONG-TERM CURRENT USE OF INSULIN (H): ICD-10-CM

## 2024-04-08 RX ORDER — EMPAGLIFLOZIN 25 MG/1
25 TABLET, FILM COATED ORAL DAILY
Qty: 90 TABLET | Refills: 1 | Status: SHIPPED | OUTPATIENT
Start: 2024-04-08 | End: 2024-07-23

## 2024-04-08 NOTE — TELEPHONE ENCOUNTER
Jardiance      Last Written Prescription Date:  01/04/24  Last Fill Quantity: 90,   # refills: 1  Last Office Visit: 09/06/23  Future Office visit:

## 2024-04-09 ENCOUNTER — HOSPITAL ENCOUNTER (OUTPATIENT)
Dept: EDUCATION SERVICES | Facility: HOSPITAL | Age: 54
Discharge: HOME | End: 2024-04-09
Attending: DIETITIAN, REGISTERED | Admitting: DIETITIAN, REGISTERED
Payer: COMMERCIAL

## 2024-04-09 VITALS
SYSTOLIC BLOOD PRESSURE: 117 MMHG | DIASTOLIC BLOOD PRESSURE: 71 MMHG | HEIGHT: 61 IN | HEART RATE: 79 BPM | OXYGEN SATURATION: 99 % | RESPIRATION RATE: 18 BRPM | BODY MASS INDEX: 34.17 KG/M2 | WEIGHT: 181 LBS

## 2024-04-09 DIAGNOSIS — E11.9 TYPE 2 DIABETES MELLITUS WITHOUT COMPLICATION, WITHOUT LONG-TERM CURRENT USE OF INSULIN (H): ICD-10-CM

## 2024-04-09 LAB — HBA1C MFR BLD: 6.3 % (ref 4.3–?)

## 2024-04-09 PROCEDURE — 83036 HEMOGLOBIN GLYCOSYLATED A1C: CPT | Performed by: PHYSICIAN ASSISTANT

## 2024-04-09 PROCEDURE — G0108 DIAB MANAGE TRN  PER INDIV: HCPCS

## 2024-04-09 RX ORDER — LANCETS
EACH MISCELLANEOUS
Qty: 100 EACH | Refills: 3 | Status: SHIPPED | OUTPATIENT
Start: 2024-04-09

## 2024-04-09 RX ORDER — BLOOD SUGAR DIAGNOSTIC
STRIP MISCELLANEOUS
Qty: 100 STRIP | Refills: 3 | Status: SHIPPED | OUTPATIENT
Start: 2024-04-09 | End: 2024-07-23

## 2024-04-09 ASSESSMENT — PAIN SCALES - GENERAL: PAINLEVEL: NO PAIN (0)

## 2024-04-09 NOTE — PROGRESS NOTES
Diabetes Self-Management Education & Support    Presents for: Follow-up    Type of Service: In Person Visit    ASSESSMENT:  Trinidad, 53 year old, returns to [facility] for follow up , A1c update.   Shares son is in the hospital- plans to head straight there after visit. Was hoping for discharge today.     Has been having issues with Trulicity supply availability.   Current dose 1.5 mg weekly. If not able to get, takes 0.75 mg dose. Has tried the 3 mg in past, didn't tolerate.     No significant change in eating habits- she does wonder if she has a gluten allergy- dtr has this.   Doesn't like diet.     A1C:  6.3  Target A1C: <7.0  Previous A1C: 6.6   Meter Report: 2 week average 123. 103-153.       Trulicity 1.5 mg weekly.   Jardiance 25 mg daily.  Metformin 1000 mg twice daily.        BP meeting ada guidelines.   Statin use no-defer to pcp for recommendations.   ASA use.   Tobacco use- no.    Foot exam due, denies concerns.    Eye exam- current Location [facility]   PCP follow up- none on file.      PCP: LALY Paez.   Insurance Coverage: [employer].     Refills needed: testing supplies and Trulicity.      Labs:    BMP CMP Microalbumin Lipids- due.      Noninvasive Liver Fibrosis Assessment:  defer to pcp for further recommendations.   Computed FIB-4 Calculation unavailable. One or more values for this score either were not found within the given timeframe or did not fit some other criterion.       Allergies   Allergen Reactions    Amoxicillin Trihydrate Itching     Augmentin      Clavulanic Acid Potassium Itching     Augmentin      Levaquin [Levofloxacin] Swelling    Penicillins Hives        Wt Readings from Last 10 Encounters:   04/09/24 82.1 kg (181 lb)   01/04/24 79.2 kg (174 lb 11.2 oz)   09/26/23 82 kg (180 lb 11.2 oz)   09/06/23 82.6 kg (182 lb 3.2 oz)   06/22/23 85.3 kg (188 lb)   04/24/23 85.2 kg (187 lb 14.4 oz)   03/06/23 83 kg (183 lb)   01/30/23 85.8 kg (189 lb 3.2 oz)   01/09/23 87.5 kg (193  lb)   11/09/22 87.5 kg (193 lb)     Patient's most recent   Lab Results   Component Value Date    A1C 7.1 03/03/2023    A1C 7.1 08/27/2020    HEMOGLOBINA1 6.3 04/09/2024     is meeting goal of <7.0    Diabetes knowledge and skills assessment:   Patient is knowledgeable in diabetes management concepts related to: Healthy Eating, Being Active, Monitoring, and Taking Medication  Continue education with the following diabetes management concepts as indicated.   Based on learning assessment above, most appropriate setting for further diabetes education would be: Individual setting.      PLAN-   continue  Trulicity 1.5 mg weekly.  0.75 if unable to get 1.5 mg.   Jardiance 25 mg daily.  Metformin 1000 mg twice daily.     Continue Bg monitoring.   Healthy lifestyle- diet, activity  Return 3 months/July for A1c update.      See Care Plan for co-developed, patient-state behavior change goals.  AVS provided for patient today.    Education Materials Provided:  No new materials provided today    SUBJECTIVE/OBJECTIVE:  Presents for: Follow-up  Accompanied by: Self  Diabetes education in the past 24mo: Yes  Focus of Visit: Monitoring, Diabetes Pathophysiology, Taking Medication  Diabetes type: Type 2  Disease course: Stable  How confident are you filling out medical forms by yourself:: Quite a bit  Diabetes management related comments/concerns: Trulicity availability  Transportation concerns: No  Difficulty affording diabetes medication?: No  Difficulty affording diabetes testing supplies?: No  Other concerns:: None  Cultural Influences/Ethnic Background:  Not  or     Diabetes Symptoms & Complications:  Diabetes Related Symptoms: Fatigue  Weight trend: Fluctuating  Symptom course: Stable  Disease course: Stable  Complications assessed today?: Yes  Autonomic neuropathy: No  CVA: No  Heart disease: Other (HTN)  Nephropathy: No  Peripheral neuropathy: No  Foot ulcerations: No  Peripheral Vascular Disease:  "No  Retinopathy: No    Patient Problem List and Family Medical History reviewed for relevant medical history, current medical status, and diabetes risk factors.    Vitals:  /71   Pulse 79   Resp 18   Ht 1.549 m (5' 1\")   Wt 82.1 kg (181 lb)   LMP 03/07/2015 (Exact Date)   SpO2 99%   BMI 34.20 kg/m    Estimated body mass index is 34.2 kg/m  as calculated from the following:    Height as of this encounter: 1.549 m (5' 1\").    Weight as of this encounter: 82.1 kg (181 lb).   Last 3 BP:   BP Readings from Last 3 Encounters:   04/09/24 117/71   01/04/24 107/70   09/26/23 111/70       History   Smoking Status    Never   Smokeless Tobacco    Never       Labs:  Lab Results   Component Value Date    A1C 7.1 03/03/2023    A1C 7.1 08/27/2020    HEMOGLOBINA1 6.3 04/09/2024     Lab Results   Component Value Date     03/03/2023     04/13/2022     06/25/2018     Lab Results   Component Value Date    LDL 52 03/03/2023    LDL 78 04/08/2019     HDL Cholesterol   Date Value Ref Range Status   04/08/2019 73 >49 mg/dL Final     Direct Measure HDL   Date Value Ref Range Status   03/03/2023 54 >=50 mg/dL Final   ]  GFR Estimate   Date Value Ref Range Status   03/03/2023 61 >60 mL/min/1.73m2 Final     Comment:     eGFR calculated using 2021 CKD-EPI equation.   06/03/2019 >90 >60 mL/min/[1.73_m2] Final     GFR Estimate If Black   Date Value Ref Range Status   06/03/2019 >90 >60 mL/min/[1.73_m2] Final     Lab Results   Component Value Date    CR 1.09 03/03/2023    CR 0.65 06/25/2018     No results found for: \"MICROALBUMIN\"    Healthy Eating:  Healthy Eating Assessed Today: Yes  Cultural/Mandaen diet restrictions?: No  Do you have any food allergies or intolerances?: Yes  Please list your food allergies / intolerances: watermelon. shellfish- rash.   dairy intolerance but does drink milk/tolerable.  Meal planning/habits: Avoiding sweets, Low carb, Smaller portions  Who cooks/prepares meals for you?: Self, " Spouse  Who purchases food in  your home?: Self, Spouse  Meals include: Breakfast, Lunch, Dinner  Breakfast: cereal with milk. sometimes bagel or toast. bigger breakfast- lunch- challenging.  Lunch: most the time smaller. lately- ham sandwich. left overs. fruit, yogurt.  (spaced out).  Dinner: pizzas. chicken cordon victorino. rice. mashed potatoes. meatloaf.  Snacks: pretzels. chi tea.  Beverages: Water, Milk, Tea    Being Active:  Being Active Assessed Today: Yes  Exercise:: Yes (walking more- getting outside.)  Days per week of moderate to strenuous exercise (like a brisk walk): 2  Barrier to exercise: Time, Other (weather.)    Monitoring:  Monitoring Assessed Today: Yes  Did patient bring glucose meter to appointment? : Yes  Blood Glucose Meter: Accu-chek  Times checking blood sugar at home (number): 5+  Times checking blood sugar at home (per): Week    Taking Medications:  Diabetes Medication(s)       Biguanides       metFORMIN (GLUCOPHAGE) 1000 MG tablet Take 1 tablet (1,000 mg) by mouth 2 times daily (with meals)       Sodium-Glucose Co-Transporter 2 (SGLT2) Inhibitors       JARDIANCE 25 MG TABS tablet TAKE 1 TABLET(25 MG) BY MOUTH DAILY       Incretin Mimetic Agents       dulaglutide (TRULICITY) 0.75 MG/0.5ML pen Inject 0.75 mg Subcutaneous every 7 days     dulaglutide (TRULICITY) 1.5 MG/0.5ML pen Inject 1.5 mg Subcutaneous every 7 days     Patient not taking: Reported on 4/9/2024            Taking Medication Assessed Today: Yes  Current Treatments: Non-insulin Injectables, Oral Medication (taken by mouth)  Problems taking diabetes medications regularly?: No  Diabetes medication side effects?: No    Problem Solving:  Problem Solving Assessed Today: Yes  Is the patient at risk for hypoglycemia?: No  Hypoglycemia Frequency: Rarely (nausea. hunger pain/dizzy if goes too long.)  Hypoglycemia Treatment: Candy (small candy bars.)  Patient carries a carbohydrate source: No    Hypoglycemia Symptoms  Hypoglycemia:  Dizziness/Lightheadedness, Headaches    Hypoglycemia Complications  Hypoglycemia Complications: None    Reducing Risks:  Reducing Risks Assessed Today: Yes  Diabetes Risks: Age over 45 years  CAD Risks: Diabetes Mellitus, Obesity  Has dilated eye exam at least once a year?: Yes  Feet checked by healthcare provider in the last year?: No    Healthy Coping:  Healthy Coping Assessed Today: Yes  Emotional response to diabetes: Concern for health and well-being  Stage of change: MAINTENANCE (Working to maintain change, with risk of relapse)  Patient Activation Measure Survey Score:      12/19/2018    10:00 AM 11/9/2022     3:59 PM   JAIME Score (Last Two)   JAIME Raw Score 40 33   Activation Score 100 65.8   JAIME Level 4 3     Time Spent: 30 minutes  Encounter Type: Individual    Any diabetes medication dose changes were made via the CDE Protocol per the patient's primary care provider. A copy of this encounter was shared with the provider.  Reshma Sherman RN Diabetes Educator,  [phone]

## 2024-04-12 NOTE — PATIENT INSTRUCTIONS
"Five Goals to Manage Diabetes:  1) Control Blood Pressure: <140/90, <130/80 if able to safely reach  2) Lower LDL \"bad\" cholesterol  <70   3) Maintain blood sugar- targets vary for each person  4) Be tobacco free  5) Take Aspirin as recommended- talk to your primary care provider.       Monitoring:  Your A1C was 6.3. Goal is less than <7.0   Previous A1C: 6.6   Next A1C: July     Check blood sugars 1 x/day.   Fasting in the morning or 2 hours after your largest meal are good times to check.    Target blood sugar: Fasting or before meals target is  . 2 hours after meal <180.    We only need 1/2 of these numbers to be within target then your A1c will be within target.    Medications:   Trulicity. 1.5 mg weekly. 0.75 mg if unable to get the 1.5 due to availability at the pharmacy.    Jardiance 25 mg daily.   Metformin 1000 mg one tab twice daily     Healthy Eating:    Target Carb:    Women 30-45 grams/meal 15-30 grams carbs optional snack.   Less if working towards weight loss.     Try to have a protein and/or  fiber with carb option.     [email or web address]     Mediterranean Lifestyle: Fish/seafood 2 times a week, vegetables, fruit, nuts, legumes, whole grains, water, regular activity.    Eat a Healthy diet  Eat more vegetables/plants in your diet  Eat healthy fats  Olive oil  Avocados  Eat healthy proteins  Poultry without the skin  Fish  Limit red meat     Limit higher carbohydrate foods such as: rice, breads, cereal, pasta, sweets. (Mindful portion sizes).   Avoid sugary drinks: regular soda/pop, juice, sports drinks. (Sugar free, zero are okay).     Snacks: Try to  work on healthy, low carbohydrate food choices.   Good snack examples:    -Greek yogurt  -Protein shake  -Few crackers/slice of cheese  -Apple/peanut butter  -Hardboiled egg/wheat thins  -Almonds or alternate nuts with berries  -Steak bites   -Small wrap sandwich  -Adult lunchable  -Serving of oatmeal with nuts or peanut butter   -Smoothie drink " with portion of Greek yogurt, ice, fruit of choice  -Trail mix  -fresh veggie  -fruit/berries- pair with protein like Greek yogurt, cheese, cottage cheese, hardboiled egg.      Activity/Exercise:       Any prolonged sedentary/sitting activity should be interrupted every 30 minutes   Try to walk or be active 5-10 minutes after eating meals     Continue working on healthy eating and moving (start low and slow, work up to 30 min, 5x/week).   Increase exercise as tolerated, even multiple short times during your day helps.    Adult goal is 150 minutes/week =  30 minutes 5 days of the week.   Aerobic activity 150 minute a week  2 days of resistance exercising      Healthy Coping:     Practicing health promotion can help improve diabetes (suggested by the ADA, March 2019)              Meditation                          Apps: for IPhone and AndTraitify                          -Calm                          -Headspace                          -Insight Timer              Gentle stretching              Mindful eating         Other:   Foot exam: yearly  Eye exam: yearly  Encourage regular dental check ups.      Consider setting a goal: (SMART) specific, measurable, attainable, realistic and Timely) goals (suggested by ADA 2023)    Great work! Congratulations!    Follow up: 7/11/24 at 3 pm for A1C update.    Please bring your meter/reader to your appointment.     If you have any questions or concerns, please call me at [phone] (Alicia, Nurse directly) or send [employer] message.    Scheduling Number: [phone]    Thank you for coming in today!  Reshma Sherman RN Diabetes Educator

## 2024-06-14 DIAGNOSIS — E11.9 TYPE 2 DIABETES MELLITUS WITHOUT COMPLICATION, WITHOUT LONG-TERM CURRENT USE OF INSULIN (H): ICD-10-CM

## 2024-06-14 NOTE — TELEPHONE ENCOUNTER
Biguanide Agents [id number]/14/2024 09:53 AM   Protocol Details Has GFR on file in past 12 months and most recent value is normal    Recent (6 mo) or future (90 days) visit within the authorizing provider's specialty

## 2024-06-14 NOTE — TELEPHONE ENCOUNTER
Metformin      Last Written Prescription Date:  03/07/24  Last Fill Quantity: 180,   # refills: 0  Last Office Visit: 08/26/23  Future Office visit:    Next 5 appointments (look out 90 days)      Aug 15, 2024 11:15 AM  (Arrive by 11:00 AM)  Adult Preventative Visit with LALY Burkett  [facility] [place] ([facility] [place] ) [address]  [place] [address]  [phone]

## 2024-06-21 ENCOUNTER — TRANSFERRED RECORDS (OUTPATIENT)
Dept: MULTI SPECIALTY CLINIC | Facility: CLINIC | Age: 54
End: 2024-06-21

## 2024-06-21 LAB — RETINOPATHY: NORMAL

## 2024-07-01 DIAGNOSIS — I10 BENIGN ESSENTIAL HYPERTENSION: ICD-10-CM

## 2024-07-01 DIAGNOSIS — J30.1 SEASONAL ALLERGIC RHINITIS DUE TO POLLEN: ICD-10-CM

## 2024-07-01 NOTE — TELEPHONE ENCOUNTER
Chlorthaidone      Last Written Prescription Date:  04/02/24  Last Fill Quantity: 90,   # refills: 0  Last Office Visit: 0906/23  Future Office visit:    Next 5 appointments (look out 90 days)      Aug 15, 2024 11:15 AM  (Arrive by 11:00 AM)  Adult Preventative Visit with LALY Burkett  [facility] [place] ([facility] [place] ) [address]  [place] [address]  [phone]

## 2024-07-01 NOTE — TELEPHONE ENCOUNTER
Flonase NS      Last Written Prescription Date:  01/05/24  Last Fill Quantity: 48g,   # refills: 1  Last Office Visit: 09/06/23  Future Office visit:    Next 5 appointments (look out 90 days)      Aug 15, 2024 11:15 AM  (Arrive by 11:00 AM)  Adult Preventative Visit with LALY Burkett  [facility] [place] ([facility] [place] ) [address]  [place] [address]  [phone]

## 2024-07-01 NOTE — TELEPHONE ENCOUNTER
Diuretics (Including Combos) Protocol [id number]/01/2024 03:44 PM   Protocol Details Has GFR on file in past 12 months and most recent value is normal

## 2024-07-02 RX ORDER — CHLORTHALIDONE 25 MG/1
25 TABLET ORAL DAILY
Qty: 90 TABLET | Refills: 0 | Status: SHIPPED | OUTPATIENT
Start: 2024-07-02 | End: 2024-08-15

## 2024-07-02 RX ORDER — FLUTICASONE PROPIONATE 50 MCG
SPRAY, SUSPENSION (ML) NASAL
Qty: 48 G | Refills: 1 | Status: SHIPPED | OUTPATIENT
Start: 2024-07-02 | End: 2024-10-02

## 2024-07-06 ENCOUNTER — HEALTH MAINTENANCE LETTER (OUTPATIENT)
Age: 54
End: 2024-07-06

## 2024-07-23 ENCOUNTER — HOSPITAL ENCOUNTER (OUTPATIENT)
Dept: EDUCATION SERVICES | Facility: HOSPITAL | Age: 54
Discharge: HOME | End: 2024-07-23
Attending: DIETITIAN, REGISTERED | Admitting: DIETITIAN, REGISTERED
Payer: COMMERCIAL

## 2024-07-23 VITALS
HEIGHT: 62 IN | RESPIRATION RATE: 16 BRPM | HEART RATE: 75 BPM | OXYGEN SATURATION: 99 % | SYSTOLIC BLOOD PRESSURE: 121 MMHG | WEIGHT: 180.5 LBS | DIASTOLIC BLOOD PRESSURE: 82 MMHG | BODY MASS INDEX: 33.21 KG/M2

## 2024-07-23 DIAGNOSIS — E11.9 TYPE 2 DIABETES MELLITUS WITHOUT COMPLICATION, WITHOUT LONG-TERM CURRENT USE OF INSULIN (H): ICD-10-CM

## 2024-07-23 DIAGNOSIS — E11.65 TYPE 2 DIABETES MELLITUS WITH HYPERGLYCEMIA, WITHOUT LONG-TERM CURRENT USE OF INSULIN (H): ICD-10-CM

## 2024-07-23 LAB — HBA1C MFR BLD: 6.4 % (ref 4.3–?)

## 2024-07-23 PROCEDURE — 83036 HEMOGLOBIN GLYCOSYLATED A1C: CPT | Performed by: PHYSICIAN ASSISTANT

## 2024-07-23 PROCEDURE — G0108 DIAB MANAGE TRN  PER INDIV: HCPCS

## 2024-07-23 RX ORDER — BLOOD SUGAR DIAGNOSTIC
STRIP MISCELLANEOUS
Qty: 100 STRIP | Refills: 3 | Status: SHIPPED | OUTPATIENT
Start: 2024-07-23

## 2024-07-23 RX ORDER — DULAGLUTIDE 1.5 MG/.5ML
1.5 INJECTION, SOLUTION SUBCUTANEOUS
Qty: 6 ML | Refills: 3 | Status: SHIPPED | OUTPATIENT
Start: 2024-07-23

## 2024-07-23 ASSESSMENT — PAIN SCALES - GENERAL: PAINLEVEL: NO PAIN (0)

## 2024-07-23 NOTE — Clinical Note
Dutch Abdi was in for A1c today- stable. She is due for labs- scheduled with you next month. No other new concerns today :) thanks! Reshma

## 2024-07-23 NOTE — PROGRESS NOTES
Diabetes Self-Management Education & Support    Presents for: Follow-up    Type of Service: In Person Visit    ASSESSMENT:  [place], 53 year old, returns to [facility] for follow up, A1c update.    Was on vacation a few weeks ago - was eating out more and then a work conference.   Back into a regular routine. Planning niece's wedding shower. Has [facility] appointment for car after this visit- cracked on vacation. Feeling some stress.     Pt shares has been more active since last visit. Lots of walking when on vacation.     Trulicity- has been able to get the 1.5 mg dose at [facility].   Has other DM meds filled at [facility].   Metformin  Jardiance 25 mg daily    A1C:  6.4  today.   Target A1C: <7.0  Previous A1C: 6.3     Meter Report: 2 week average 134. Testing most daily, fasting. 122-152.   Accu-chek guide me.      Wt Readings from Last 10 Encounters:   07/23/24 81.9 kg (180 lb 8 oz)   04/09/24 82.1 kg (181 lb)   01/04/24 79.2 kg (174 lb 11.2 oz)   09/26/23 82 kg (180 lb 11.2 oz)   09/06/23 82.6 kg (182 lb 3.2 oz)   06/22/23 85.3 kg (188 lb)   04/24/23 85.2 kg (187 lb 14.4 oz)   03/06/23 83 kg (183 lb)   01/30/23 85.8 kg (189 lb 3.2 oz)   01/09/23 87.5 kg (193 lb)     BP meeting ADA target guideline. Statin use no,  defer to PCP for recommendations.  ASA use. Tobacco use- no.   Foot exam due, denies new concerns.  Eye exam- current Location [facility]. 5/2024   PCP: Linn RUFFIN    PCP follow up-scheduled.    Insurance Coverage: [employer].     Refills needed: Trulicity 1.5 mg, Metformin, Jardiance 25 mg, test strips.       [facility]: [facility].       Labs:  due: BMP Microalbumin Lipids within the last year. -scheduled with PC next month/August.     Noninvasive Liver Fibrosis Assessment:  defer to pcp for further recommendations.   Computed FIB-4 Calculation unavailable. One or more values for this score either were not found within the given timeframe or did not fit some other  criterion.     Allergies   Allergen Reactions    Amoxicillin Trihydrate Itching     Augmentin      Clavulanic Acid Potassium Itching     Augmentin      Levaquin [Levofloxacin] Swelling    Penicillins Hives      Patient's most recent   Lab Results   Component Value Date    A1C 7.1 03/03/2023    A1C 7.1 08/27/2020    HEMOGLOBINA1 6.4 07/23/2024     is meeting goal of <7.0    Diabetes knowledge and skills assessment:   Patient is knowledgeable in diabetes management concepts related to: Being Active, Monitoring, and Taking Medication  Continue education with the following diabetes management concepts as indicated.   Based on learning assessment above, most appropriate setting for further diabetes education would be: Individual setting.      PLAN  Continue current medication plans.   Labs due- has pc appointment in August.   Continue working on healthy lifestyle habits.   DRC 3 months- A1c update.     See Care Plan for co-developed, patient-state behavior change goals.  AVS provided for patient today.    Education Materials Provided:  No new materials provided today      SUBJECTIVE/OBJECTIVE:  Presents for: Follow-up  Accompanied by: Self  Diabetes education in the past 24mo: Yes  Focus of Visit: Monitoring, Diabetes Pathophysiology, Taking Medication  Diabetes type: Type 2  Date of diagnosis: August 2017  Disease course: Stable  How confident are you filling out medical forms by yourself:: Extremely  Diabetes management related comments/concerns: needs refill.  Transportation concerns: No  Difficulty affording diabetes medication?: No  Difficulty affording diabetes testing supplies?: No  Other concerns:: None  Cultural Influences/Ethnic Background:  Not  or     Diabetes Symptoms & Complications:  Diabetes Related Symptoms: None  Weight trend: Stable  Symptom course: Improving  Disease course: Stable  Complications assessed today?: Yes  Autonomic neuropathy: No  CVA: No  Heart disease: Other  "(HTN)  Nephropathy: No  Peripheral neuropathy: No  Foot ulcerations: No  Peripheral Vascular Disease: No  Retinopathy: No  Sexual dysfunction: No    Patient Problem List and Family Medical History reviewed for relevant medical history, current medical status, and diabetes risk factors.    Vitals:  /82   Pulse 75   Resp 16   Ht 1.562 m (5' 1.5\")   Wt 81.9 kg (180 lb 8 oz)   LMP 03/07/2015 (Exact Date)   SpO2 99%   BMI 33.55 kg/m    Estimated body mass index is 33.55 kg/m  as calculated from the following:    Height as of this encounter: 1.562 m (5' 1.5\").    Weight as of this encounter: 81.9 kg (180 lb 8 oz).   Last 3 BP:   BP Readings from Last 3 Encounters:   07/23/24 121/82   04/09/24 117/71   01/04/24 107/70       History   Smoking Status    Never   Smokeless Tobacco    Never       Labs:  Lab Results   Component Value Date    A1C 7.1 03/03/2023    A1C 7.1 08/27/2020    HEMOGLOBINA1 6.4 07/23/2024     Lab Results   Component Value Date     03/03/2023     04/13/2022     06/25/2018     Lab Results   Component Value Date    LDL 52 03/03/2023    LDL 78 04/08/2019     HDL Cholesterol   Date Value Ref Range Status   04/08/2019 73 >49 mg/dL Final     Direct Measure HDL   Date Value Ref Range Status   03/03/2023 54 >=50 mg/dL Final   ]  GFR Estimate   Date Value Ref Range Status   03/03/2023 61 >60 mL/min/1.73m2 Final     Comment:     eGFR calculated using 2021 CKD-EPI equation.   06/03/2019 >90 >60 mL/min/[1.73_m2] Final     GFR Estimate If Black   Date Value Ref Range Status   06/03/2019 >90 >60 mL/min/[1.73_m2] Final     Lab Results   Component Value Date    CR 1.09 03/03/2023    CR 0.65 06/25/2018     No results found for: \"MICROALBUMIN\"    Healthy Eating:  Healthy Eating Assessed Today: Yes  Cultural/Synagogue diet restrictions?: No  Do you have any food allergies or intolerances?: Yes  Please list your food allergies / intolerances: watermelon. shellfish- rash.   dairy intolerance " but does drink milk/tolerable.  Meal planning/habits: Avoiding sweets, Low carb, Smaller portions  Who cooks/prepares meals for you?: Self, Spouse  Who purchases food in  your home?: Self, Spouse  How many times a week on average do you eat food made away from home (restaurant/take-out)?: 1  Meals include: Breakfast, Dinner, Morning Snack, Afternoon Snack  Breakfast: cereal with milk. sometimes bagel or toast. bigger breakfast- lunch- challenging.  Lunch: most the time smaller. lately- ham sandwich. left overs. fruit, yogurt.  (spaced out).  Dinner: pizzas. chicken cordon victorino. rice. mashed potatoes. meatloaf.  Snacks: pretzels. chi tea.  Other: ice cream  Beverages: Water, Milk  Has patient met with a dietitian in the past?: No    Being Active:  Being Active Assessed Today: Yes  Exercise:: Yes (walking more- getting outside.)  Days per week of moderate to strenuous exercise (like a brisk walk): 5  On average, minutes per day of exercise at this level: 30  How intense was your typical exercise? : Light (like stretching or slow walking)  Exercise Minutes per Week: 150  Barrier to exercise: Time    Monitoring:  Monitoring Assessed Today: Yes  Did patient bring glucose meter to appointment? : Yes  Blood Glucose Meter: Accu-chek  Times checking blood sugar at home (number): 5+  Times checking blood sugar at home (per): Week  Blood glucose trend: Decreasing    Taking Medications:  Diabetes Medication(s)       Biguanides       metFORMIN (GLUCOPHAGE) 1000 MG tablet Take 1 tablet (1,000 mg) by mouth 2 times daily (with meals)       Sodium-Glucose Co-Transporter 2 (SGLT2) Inhibitors       empagliflozin (JARDIANCE) 25 MG TABS tablet Take 1 tablet (25 mg) by mouth daily       Incretin Mimetic Agents       dulaglutide (TRULICITY) 1.5 MG/0.5ML pen Inject 1.5 mg subcutaneously every 7 days            Taking Medication Assessed Today: Yes  Current Treatments: Non-insulin Injectables, Oral Medication (taken by mouth)  Problems  taking diabetes medications regularly?: No  Diabetes medication side effects?: No    Problem Solving:  Problem Solving Assessed Today: Yes  Is the patient at risk for hypoglycemia?: No  Hypoglycemia Frequency: Rarely (nausea. hunger pain/dizzy if goes too long.)  Hypoglycemia Treatment: Candy (small candy bars.)  Patient carries a carbohydrate source: No  Medical ID: No  Does patient have DKA prevention plan?: No  Does patient have severe weather/disaster plan for diabetes management?: No    Hypoglycemia Symptoms  Hypoglycemia: Dizziness/Lightheadedness, Headaches    Hypoglycemia Complications  Hypoglycemia Complications: None    Reducing Risks:  Reducing Risks Assessed Today: Yes  Diabetes Risks: Age over 45 years  CAD Risks: Diabetes Mellitus, Obesity  Has dilated eye exam at least once a year?: Yes  Sees dentist every 6 months?: Yes  Feet checked by healthcare provider in the last year?: No    Healthy Coping:  Healthy Coping Assessed Today: Yes  Emotional response to diabetes: Concern for health and well-being  Informal Support system:: Children, Family, Friends, Parent, Spouse  Stage of change: MAINTENANCE (Working to maintain change, with risk of relapse)  Support resources: None  Patient Activation Measure Survey Score:      12/19/2018    10:00 AM 11/9/2022     3:59 PM   JAIME Score (Last Two)   JAIME Raw Score 40 33   Activation Score 100 65.8   JAIME Level 4 3     Time Spent: 30 minutes  Encounter Type: Individual    Any diabetes medication dose changes were made via the CDE Protocol per the patient's primary care provider. A copy of this encounter was shared with the provider.  Reshma Shemran RN [facility]   [phone]  7/23/2024 at 9:00 AM

## 2024-07-23 NOTE — PATIENT INSTRUCTIONS
"Five Goals to Manage Diabetes:  1) Control Blood Pressure: <140/90, <130/80 if able to safely reach  2) Lower LDL \"bad\" cholesterol  <70   3) Maintain blood sugar- targets vary for each person  4) Be tobacco free  5) Take Aspirin as recommended- talk to your primary care provider if this is right for you.     Monitoring:  Your A1C was 6.4. Goal is less than <7.0   Previous A1C: 6.3   Next A1C: Oct 24th or later.     Check blood sugars 1 x/day.   Fasting in the morning or 2 hours after your largest meal are good times to check.    Target blood sugar: Fasting or before meals target is . 2 hours after meal <180.    We only need 1/2 of these numbers to be within target then your A1c will be within target.    Medications:   Continue current plan- refills sent today.      Healthy Eating:    Target Carb:    Women 30-45 grams/meal 15-30 grams carbs optional snack.   Less if working towards weight loss.     Try to have a protein and/or fiber with carb option.     [email or web address]     Mediterranean Lifestyle: Fish/seafood 2 times a week, vegetables, fruit, nuts, legumes, whole grains, water, regular activity.    Eat a Healthy diet  Eat more vegetables/plants in your diet  Eat healthy fats  Olive oil  Avocados  Eat healthy proteins  Poultry without the skin  Fish  Limit red meat     Limit higher carbohydrate foods such as: rice, breads, cereal, pasta, sweets. (Mindful portion sizes).       Good snack examples:   -Greek yogurt  -Protein shake  -Few crackers/slice of cheese  -Apple/peanut butter  -Hardboiled egg/wheat thins  -Almonds or alternate nuts with berries  -Steak bites   -Small wrap sandwich  -Adult lunchable  -Serving of oatmeal with nuts or peanut butter   -Smoothie drink with portion of Greek yogurt, ice, fruit of choice  -Trail mix  -fresh veggie  -fruit/berries- pair with protein like Greek yogurt, cheese, cottage cheese, hardboiled egg.      Activity/Exercise:       Any prolonged sedentary/sitting " activity should be interrupted every 30 minutes   Try to walk or be active 5-10 minutes after eating meals     Adult goal is 150 minutes/week =  30 minutes 5 days of the week.   Aerobic activity 150 minute a week  2 days of resistance exercising      Healthy Coping:     Practicing health promotion can help improve diabetes (suggested by the ADA, March 2019)              Meditation                          Apps: for IPhone and Android                          -Calm                          -Headspace                          -Insight Timer              Gentle stretching              Mindful eating         Other:   Foot exam: yearly  Eye exam: yearly  Encourage regular dental check ups.    Consider setting a goal: (SMART) specific, measurable, attainable, realistic and Timely) goals (suggested by ADA 2023)    Follow up: 3 months for next A1C update. 10/24 or later.    Please bring your meter/reader to your appointment.     If you have any questions or concerns, please call me at [phone] (Alicia Nurse directly) or send [employer] message.    Scheduling Number: [phone]    Thank you for coming in today!  Reshma Sherman RN [facility]ES  Diabetes Educator

## 2024-07-25 ENCOUNTER — TELEPHONE (OUTPATIENT)
Dept: PHARMACY | Facility: PHYSICIAN GROUP | Age: 54
End: 2024-07-25
Payer: COMMERCIAL

## 2024-07-25 NOTE — TELEPHONE ENCOUNTER
This patient no longer wishes to continue following-up with [facility]. We will stop reaching out to the patient at this time. Please let us know if we can assist in this patient's care in the future.    Routing to PCP as BECKY.    Reji Quinn, PharmD  Medication Therapy Management Pharmacist  [facility]  Office phone: [phone]

## 2024-07-31 ENCOUNTER — HOSPITAL ENCOUNTER (EMERGENCY)
Facility: HOSPITAL | Age: 54
Discharge: HOME | End: 2024-07-31
Attending: NURSE PRACTITIONER | Admitting: NURSE PRACTITIONER
Payer: COMMERCIAL

## 2024-07-31 VITALS
OXYGEN SATURATION: 99 % | TEMPERATURE: 97.2 F | HEART RATE: 81 BPM | RESPIRATION RATE: 18 BRPM | SYSTOLIC BLOOD PRESSURE: 126 MMHG | DIASTOLIC BLOOD PRESSURE: 84 MMHG | BODY MASS INDEX: 33.79 KG/M2 | HEIGHT: 61 IN | WEIGHT: 179 LBS

## 2024-07-31 DIAGNOSIS — L03.031 PARONYCHIA OF GREAT TOE OF RIGHT FOOT: Primary | ICD-10-CM

## 2024-07-31 PROCEDURE — 250N000013 HC RX MED GY IP 250 OP 250 PS 637: Performed by: NURSE PRACTITIONER

## 2024-07-31 PROCEDURE — 99213 OFFICE O/P EST LOW 20 MIN: CPT | Performed by: NURSE PRACTITIONER

## 2024-07-31 PROCEDURE — G0463 HOSPITAL OUTPT CLINIC VISIT: HCPCS

## 2024-07-31 RX ORDER — CLINDAMYCIN HCL 150 MG
450 CAPSULE ORAL ONCE
Status: COMPLETED | OUTPATIENT
Start: 2024-07-31 | End: 2024-07-31

## 2024-07-31 RX ORDER — CLINDAMYCIN HCL 150 MG
300 CAPSULE ORAL 3 TIMES DAILY
Qty: 42 CAPSULE | Refills: 0 | Status: SHIPPED | OUTPATIENT
Start: 2024-07-31 | End: 2024-08-07

## 2024-07-31 RX ADMIN — CLINDAMYCIN HYDROCHLORIDE 450 MG: 150 CAPSULE ORAL at 18:49

## 2024-07-31 ASSESSMENT — ENCOUNTER SYMPTOMS
COLOR CHANGE: 1
MYALGIAS: 1
JOINT SWELLING: 1

## 2024-07-31 NOTE — DISCHARGE INSTRUCTIONS
You have an infection to your toe which should be treated with antibiotic prescribed today.  Tylenol or ibuprofen as needed for pain.    Soak your foot in warm Epsom salt water for 20 minutes at a time about 2-3 times a day.    Follow-up with your doctor as needed.  Return to urgent care or emergency room for any worsening or concerning symptoms.

## 2024-07-31 NOTE — ED TRIAGE NOTES
Pt presents with concerns of ingrown toenail.   Pt has hx of diabetes symptom onset was 3 days ago. Pt reports intermittent symptoms over the last few months.

## 2024-07-31 NOTE — ED PROVIDER NOTES
History     Chief Complaint   Patient presents with    Toe Pain     HPI  Edith Doty is a 53 year old female who presents to urgent care with concerns of infected ingrown toenail.  Over the last couple of days ago patient has had redness, pain and swelling to her right great toe.  No recent injury to her toe.  She is diabetic which is why she is concerned about an infection.  No known history of neuropathy.    Allergies:  Allergies   Allergen Reactions    Amoxicillin Trihydrate Itching     Augmentin      Clavulanic Acid Potassium Itching     Augmentin      Levaquin [Levofloxacin] Swelling    Penicillins Hives       Problem List:    Patient Active Problem List    Diagnosis Date Noted    Adjustment disorder with anxious mood 05/12/2019     Priority: Medium    Type 2 diabetes mellitus without complication, without long-term current use of insulin (H) 08/30/2017     Priority: Medium    Obesity 03/02/2014     Priority: Medium    Allergic rhinitis due to pollen 04/25/2011     Priority: Medium    HTN (hypertension) 04/25/2011     Priority: Medium    Hypothyroidism, acquired 01/01/2011     Priority: Medium    Asthma, mild intermittent 01/01/2011     Priority: Medium    Asthma 10/29/2009     Priority: Medium     Overview:   Seasonal. [facility] record.       Hypertension 10/29/2009     Priority: Medium     Overview:   [facility] record.       Achilles tendon tear 10/28/2009     Priority: Medium    Chronic lymphocytic thyroiditis 01/28/2009     Priority: Medium    Nontoxic uninodular goiter 11/28/2007     Priority: Medium        Past Medical History:    Past Medical History:   Diagnosis Date    Allergic Rhinitis, Seasonal 04/25/2011    Asthma 01/01/2011    Diabetes (H)     Hypertension, Benign 04/25/2011    Hypothyroidism 01/01/2011    Nontoxic uninodular goiter 11/28/2007    Prediabetes 04/25/2011    Uncomplicated asthma        Past Surgical History:    Past Surgical History:   Procedure Laterality Date     CHOLECYSTECTOMY  01/01/1996    eye surgery for strabismus at ages 2 & 4      HYSTERECTOMY TOTAL ABDOMINAL, SALPINGECTOMY Bilateral 07/02/2018    [facility]. Ovaries remain. 10 cm uterine mass removed: well circumscribed mass consistent with leiomyoma.    HYSTERECTOMY, PAP NO LONGER INDICATED N/A 07/02/2018    Cervix removed per Hyst path report.       Family History:    Family History   Problem Relation Age of Onset    Other - See Comments Mother         fibromyalgia and endometreosis    Heart Disease Mother         murmer    Allergies Mother         environmental    Diabetes Father     Other - See Comments Sister         endometriosis    Allergies Sister         environmental    Breast Cancer Paternal Grandmother         over 50    Hypertension Brother     Allergies Brother         environmental    Breast Cancer Maternal Aunt         early 50's    Breast Cancer Other     Asthma No family hx of     Thyroid Disease No family hx of        Social History:  Marital Status:   [2]  Social History     Tobacco Use    Smoking status: Never    Smokeless tobacco: Never    Tobacco comments:     no passive exposure   Vaping Use    Vaping status: Never Used   Substance Use Topics    Alcohol use: Not Currently     Comment: socially     Drug use: No        Medications:    clindamycin (CLEOCIN) 150 MG capsule  ACCU-CHEK GUIDE test strip  amLODIPine (NORVASC) 10 MG tablet  aspirin 81 MG EC tablet  blood glucose monitoring (SOFTCLIX) lancets  chlorthalidone (HYGROTON) 25 MG tablet  dulaglutide (TRULICITY) 1.5 MG/0.5ML pen  empagliflozin (JARDIANCE) 25 MG TABS tablet  fluticasone (FLONASE) 50 MCG/ACT nasal spray  levalbuterol (XOPENEX HFA) 45 MCG/ACT inhaler  levothyroxine (SYNTHROID/LEVOTHROID) 150 MCG tablet  lisinopril (ZESTRIL) 10 MG tablet  loratadine (CLARITIN) 10 MG tablet  metFORMIN (GLUCOPHAGE) 1000 MG tablet  metoprolol succinate ER (TOPROL XL) 100 MG 24 hr tablet  metoprolol succinate ER (TOPROL XL) 50 MG 24 hr  "tablet  Multiple Vitamin (MULTIVITAMIN ADULT PO)          Review of Systems   Musculoskeletal:  Positive for joint swelling and myalgias.   Skin:  Positive for color change.   All other systems reviewed and are negative.      Physical Exam   BP: 126/84  Pulse: 81  Temp: 97.2  F (36.2  C)  Resp: 18  Height: 154.9 cm (5' 1\")  Weight: 81.2 kg (179 lb)  SpO2: 99 %      Physical Exam  Vitals and nursing note reviewed.   Constitutional:       Appearance: Normal appearance. She is not ill-appearing or toxic-appearing.   HENT:      Head: Atraumatic.   Eyes:      Pupils: Pupils are equal, round, and reactive to light.   Cardiovascular:      Rate and Rhythm: Normal rate.      Pulses:           Dorsalis pedis pulses are 2+ on the right side.   Pulmonary:      Effort: Pulmonary effort is normal. No respiratory distress.   Musculoskeletal:         General: Normal range of motion.      Cervical back: Neck supple.        Feet:    Feet:      Right foot:      Skin integrity: Erythema present. No ulcer, blister or skin breakdown.      Toenail Condition: Right toenails are normal.      Comments: Swelling and erythema to medial aspect of left great toe.  No fluctuance.  No active drainage.  Does not not appear to be consistent with an ingrown toenail.   Pulses intact.  Cap refill less than 2 seconds.  No bruising or obvious deformity to the toenail.    Skin:     General: Skin is warm and dry.      Capillary Refill: Capillary refill takes less than 2 seconds.      Findings: Erythema present.   Neurological:      Mental Status: She is alert and oriented to person, place, and time.         ED Course        Procedures         No results found for this or any previous visit (from the past 24 hour(s)).    Medications   clindamycin (CLEOCIN) capsule 450 mg (450 mg Oral $Given 7/31/24 9184)       Assessments & Plan (with Medical Decision Making)   53-year-old female that presented for evaluation of redness, pain and swelling to the right great " toe.  Physical exam findings are consistent with paronychia of the right great toe.  This will be treated with clindamycin.  Recommended soaking foot in warm Epsom salts for 20 minutes at a time for 2-3 times a day.  Tylenol or ibuprofen as needed for pain.  Follow-up with primary doctor if no improvement in symptoms.  Return to urgent care or emergency department for any worsening or concerning symptoms.    I have reviewed the nursing notes.    I have reviewed the findings, diagnosis, plan and need for follow up with the patient.  This document was prepared using a combination of typing and voice generated software.  While every attempt was made for accuracy, spelling and grammatical errors may exist.         Discharge Medication List as of 7/31/2024  6:42 PM        START taking these medications    Details   clindamycin (CLEOCIN) 150 MG capsule Take 2 capsules (300 mg) by mouth 3 times daily for 7 days, Disp-42 capsule, R-0, E-Prescribe             Final diagnoses:   Paronychia of great toe of right foot       7/31/2024   [facility]       Mpofu, Naominctiffany, CNP  07/31/24 1921

## 2024-08-12 SDOH — HEALTH STABILITY: PHYSICAL HEALTH: ON AVERAGE, HOW MANY MINUTES DO YOU ENGAGE IN EXERCISE AT THIS LEVEL?: 20 MIN

## 2024-08-12 SDOH — HEALTH STABILITY: PHYSICAL HEALTH: ON AVERAGE, HOW MANY DAYS PER WEEK DO YOU ENGAGE IN MODERATE TO STRENUOUS EXERCISE (LIKE A BRISK WALK)?: 3 DAYS

## 2024-08-12 ASSESSMENT — ASTHMA QUESTIONNAIRES
ACT_TOTALSCORE: 24
QUESTION_5 LAST FOUR WEEKS HOW WOULD YOU RATE YOUR ASTHMA CONTROL: COMPLETELY CONTROLLED
ACT_TOTALSCORE: 24
QUESTION_4 LAST FOUR WEEKS HOW OFTEN HAVE YOU USED YOUR RESCUE INHALER OR NEBULIZER MEDICATION (SUCH AS ALBUTEROL): ONCE A WEEK OR LESS
QUESTION_2 LAST FOUR WEEKS HOW OFTEN HAVE YOU HAD SHORTNESS OF BREATH: NOT AT ALL
QUESTION_3 LAST FOUR WEEKS HOW OFTEN DID YOUR ASTHMA SYMPTOMS (WHEEZING, COUGHING, SHORTNESS OF BREATH, CHEST TIGHTNESS OR PAIN) WAKE YOU UP AT NIGHT OR EARLIER THAN USUAL IN THE MORNING: NOT AT ALL
QUESTION_1 LAST FOUR WEEKS HOW MUCH OF THE TIME DID YOUR ASTHMA KEEP YOU FROM GETTING AS MUCH DONE AT WORK, SCHOOL OR AT HOME: NONE OF THE TIME

## 2024-08-12 ASSESSMENT — SOCIAL DETERMINANTS OF HEALTH (SDOH): HOW OFTEN DO YOU GET TOGETHER WITH FRIENDS OR RELATIVES?: ONCE A WEEK

## 2024-08-15 ENCOUNTER — OFFICE VISIT (OUTPATIENT)
Dept: FAMILY MEDICINE | Facility: OTHER | Age: 54
End: 2024-08-15
Attending: PHYSICIAN ASSISTANT
Payer: COMMERCIAL

## 2024-08-15 ENCOUNTER — ORDERS ONLY (AUTO-RELEASED) (OUTPATIENT)
Dept: FAMILY MEDICINE | Facility: OTHER | Age: 54
End: 2024-08-15

## 2024-08-15 VITALS
BODY MASS INDEX: 33.78 KG/M2 | HEART RATE: 76 BPM | SYSTOLIC BLOOD PRESSURE: 122 MMHG | DIASTOLIC BLOOD PRESSURE: 84 MMHG | TEMPERATURE: 97.8 F | OXYGEN SATURATION: 100 % | HEIGHT: 61 IN | WEIGHT: 178.9 LBS | RESPIRATION RATE: 18 BRPM

## 2024-08-15 DIAGNOSIS — Z01.419 WELL WOMAN EXAM: Primary | ICD-10-CM

## 2024-08-15 DIAGNOSIS — J45.909 ASTHMA, UNSPECIFIED ASTHMA SEVERITY, UNSPECIFIED WHETHER COMPLICATED, UNSPECIFIED WHETHER PERSISTENT: ICD-10-CM

## 2024-08-15 DIAGNOSIS — I10 BENIGN ESSENTIAL HYPERTENSION: ICD-10-CM

## 2024-08-15 DIAGNOSIS — D69.1 ABNORMAL PLATELETS (H): ICD-10-CM

## 2024-08-15 DIAGNOSIS — E66.01 MORBID OBESITY (H): ICD-10-CM

## 2024-08-15 DIAGNOSIS — E11.65 TYPE 2 DIABETES MELLITUS WITH HYPERGLYCEMIA, WITHOUT LONG-TERM CURRENT USE OF INSULIN (H): ICD-10-CM

## 2024-08-15 DIAGNOSIS — E03.9 ACQUIRED HYPOTHYROIDISM: ICD-10-CM

## 2024-08-15 DIAGNOSIS — J45.20 MILD INTERMITTENT ASTHMA WITHOUT COMPLICATION: ICD-10-CM

## 2024-08-15 DIAGNOSIS — Z12.11 SPECIAL SCREENING FOR MALIGNANT NEOPLASMS, COLON: ICD-10-CM

## 2024-08-15 PROCEDURE — 90471 IMMUNIZATION ADMIN: CPT | Performed by: PHYSICIAN ASSISTANT

## 2024-08-15 PROCEDURE — 99213 OFFICE O/P EST LOW 20 MIN: CPT | Mod: 25 | Performed by: PHYSICIAN ASSISTANT

## 2024-08-15 PROCEDURE — 90677 PCV20 VACCINE IM: CPT | Performed by: PHYSICIAN ASSISTANT

## 2024-08-15 PROCEDURE — 99396 PREV VISIT EST AGE 40-64: CPT | Mod: 25 | Performed by: PHYSICIAN ASSISTANT

## 2024-08-15 RX ORDER — METOPROLOL SUCCINATE 50 MG/1
TABLET, EXTENDED RELEASE ORAL
Qty: 90 TABLET | Refills: 1 | Status: SHIPPED | OUTPATIENT
Start: 2024-08-15

## 2024-08-15 RX ORDER — CHLORTHALIDONE 25 MG/1
25 TABLET ORAL DAILY
Qty: 90 TABLET | Refills: 0 | Status: SHIPPED | OUTPATIENT
Start: 2024-08-15

## 2024-08-15 RX ORDER — AMLODIPINE BESYLATE 10 MG/1
10 TABLET ORAL DAILY
Qty: 90 TABLET | Refills: 2 | Status: SHIPPED | OUTPATIENT
Start: 2024-08-15

## 2024-08-15 RX ORDER — LEVALBUTEROL TARTRATE 45 UG/1
2 AEROSOL, METERED ORAL EVERY 4 HOURS PRN
Qty: 45 G | Refills: 3 | Status: SHIPPED | OUTPATIENT
Start: 2024-08-15

## 2024-08-15 RX ORDER — LISINOPRIL 10 MG/1
10 TABLET ORAL 2 TIMES DAILY
Qty: 180 TABLET | Refills: 3 | Status: SHIPPED | OUTPATIENT
Start: 2024-08-15

## 2024-08-15 RX ORDER — METOPROLOL SUCCINATE 100 MG/1
TABLET, EXTENDED RELEASE ORAL
Qty: 90 TABLET | Refills: 1 | Status: SHIPPED | OUTPATIENT
Start: 2024-08-15

## 2024-08-15 NOTE — PROGRESS NOTES
"Preventive Care Visit  [facility]  LALY Ortega, Family Medicine  Aug 15, 2024      Assessment & Plan     Well woman exam  Mammogram is set for September. Had ultrasound last year. No concerns on her exam, she does this herself. Immunizations are reviewed. Due for colon screening.   She is going to do Cologuard.  She is going to immunizations.  But has an out of town wedding this weekend. She will come back and do this.    HTN (hypertension)  She is going to be given. Refill. We finally have her in good control. Weight is down 22#.    - amLODIPine (NORVASC) 10 MG tablet; Take 1 tablet (10 mg) by mouth daily  - chlorthalidone (HYGROTON) 25 MG tablet; Take 1 tablet (25 mg) by mouth daily  - lisinopril (ZESTRIL) 10 MG tablet; Take 1 tablet (10 mg) by mouth 2 times daily  - metoprolol succinate ER (TOPROL XL) 100 MG 24 hr tablet; TAKE 1 TABLET(100 MG) BY MOUTH DAILY IN THE MORNING  - metoprolol succinate ER (TOPROL XL) 50 MG 24 hr tablet; TAKE 1 TABLET(50 MG) BY MOUTH DAILY IN THE AFTERNOON    Asthma, mild intermittent  Her asthma is well controlled. Taking rescue only.    - levalbuterol (XOPENEX HFA) 45 MCG/ACT inhaler; Inhale 2 puffs into the lungs every 4 hours as needed for wheezing or shortness of breath    Abnormal platelets (H)  Recheck her CBC .     Morbid obesity (H)  She will be continuing to lose weight. Down 22# .  The Trulicity and Metformin are helping her. Is really changed her diet.     Type 2 diabetes mellitus with hyperglycemia, without long-term current use of insulin (H)  Her sugar is good. Saw Diabetic Ed and A1C was stable at 6.4.  sugars are always 100 to 130.   She is watching her diet.     Patient has been advised of split billing requirements and indicates understanding: Yes        BMI  Estimated body mass index is 33.8 kg/m  as calculated from the following:    Height as of this encounter: 1.549 m (5' 1\").    Weight as of this encounter: 81.1 kg (178 lb 14.4 oz).   Weight " management plan: Discussed healthy diet and exercise guidelines    Counseling  Appropriate preventive services were addressed with this patient via screening, questionnaire, or discussion as appropriate for fall prevention, nutrition, physical activity, Tobacco-use cessation, social engagement, weight loss and cognition.  Checklist reviewing preventive services available has been given to the patient.  Reviewed patient's diet, addressing concerns and/or questions.   She is at risk for lack of exercise and has been provided with information to increase physical activity for the benefit of her well-being.   She is at risk for psychosocial distress and has been provided with information to reduce risk.       See Patient Instructions    No follow-ups on file.    Subjective   Trinidad is a 53 year old, presenting for the following:  Physical        8/15/2024    10:55 AM   Additional Questions   Roomed by rodrigo ha   Accompanied by none         8/15/2024    10:55 AM   Patient Reported Additional Medications   Patient reports taking the following new medications none        Via the Health Maintenance questionnaire, the patient has reported the following services have been completed -Eye Exam: [facility] in [place] 2024-06-21, this information has been sent to the abstraction team.  Health Care Directive  Patient does not have a Health Care Directive or Living Will: Discussed advance care planning with patient; however, patient declined at this time.    HPI    Diabetes Follow-up    How often are you checking your blood sugar? One time daily  What time of day are you checking your blood sugars (select all that apply)?  Before meals  Have you had any blood sugars above 200?  No  Have you had any blood sugars below 70?  No  What symptoms do you notice when your blood sugar is low?  None  What concerns do you have today about your diabetes? None   Do you have any of these symptoms? (Select all that apply)  No  numbness or tingling in feet.  No redness, sores or blisters on feet.  No complaints of excessive thirst.  No reports of blurry vision.  No significant changes to weight.          Hyperlipidemia Follow-Up    Are you regularly taking any medication or supplement to lower your cholesterol?   Yes- crestor  Are you having muscle aches or other side effects that you think could be caused by your cholesterol lowering medication?  No    Hypertension Follow-up    Do you check your blood pressure regularly outside of the clinic? Yes   Are you following a low salt diet? No  Are your blood pressures ever more than 140 on the top number (systolic) OR more   than 90 on the bottom number (diastolic), for example 140/90? No    BP Readings from Last 2 Encounters:   08/15/24 122/84   07/31/24 126/84     Hemoglobin A1C (%)   Date Value   03/03/2023 7.1 (H)   04/13/2022 6.6 (H)   08/27/2020 7.1 (A)   08/07/2019 6.5 (A)     LDL Cholesterol Calculated (mg/dL)   Date Value   03/03/2023 52   09/23/2021 69   04/08/2019 78   08/15/2017 63         Hypothyroidism Follow-up    Since last visit, patient describes the following symptoms: Weight stable, no hair loss, no skin changes, no constipation, no loose stools        8/12/2024   General Health   How would you rate your overall physical health? Good   Feel stress (tense, anxious, or unable to sleep) Only a little      (!) STRESS CONCERN      8/12/2024   Nutrition   Three or more servings of calcium each day? Yes   Diet: Diabetic   How many servings of fruit and vegetables per day? (!) 2-3   How many sweetened beverages each day? 0-1            8/12/2024   Exercise   Days per week of moderate/strenous exercise 3 days   Average minutes spent exercising at this level 20 min            8/12/2024   Social Factors   Frequency of gathering with friends or relatives Once a week   Worry food won't last until get money to buy more No   Food not last or not have enough money for food? No   Do you have  housing? (Housing is defined as stable permanent housing and does not include staying ouside in a car, in a tent, in an abandoned building, in an overnight shelter, or couch-surfing.) Yes   Are you worried about losing your housing? No   Lack of transportation? No   Unable to get utilities (heat,electricity)? No            8/12/2024   Fall Risk   Fallen 2 or more times in the past year? No   Trouble with walking or balance? No             8/12/2024   Dental   Dentist two times every year? Yes            8/12/2024   TB Screening   Were you born outside of the [place]? No            Today's PHQ-2 Score:       8/14/2024    11:43 AM   PHQ-2 ( 1999 [employer])   Q1: Little interest or pleasure in doing things 0   Q2: Feeling down, depressed or hopeless 0   PHQ-2 Score 0   Q1: Little interest or pleasure in doing things Not at all   Q2: Feeling down, depressed or hopeless Not at all   PHQ-2 Score 0           8/12/2024   Substance Use   Alcohol more than 3/day or more than 7/wk No   Do you use any other substances recreationally? No        Social History     Tobacco Use    Smoking status: Never    Smokeless tobacco: Never    Tobacco comments:     no passive exposure   Vaping Use    Vaping status: Never Used   Substance Use Topics    Alcohol use: Not Currently     Comment: socially     Drug use: No           8/24/2023   LAST FHS-7 RESULTS   1st degree relative breast or ovarian cancer No   Any relative bilateral breast cancer Unknown   Any male have breast cancer No   Any ONE woman have BOTH breast AND ovarian cancer No   Any woman with breast cancer before 50yrs No   2 or more relatives with breast AND/OR ovarian cancer Yes   2 or more relatives with breast AND/OR bowel cancer Yes           Mammogram Screening - Mammogram every 1-2 years updated in Health Maintenance based on mutual decision making          8/12/2024   One time HIV Screening   Previous HIV test? No          8/12/2024   STI Screening   New sexual partner(s) since  last STI/HIV test? No        History of abnormal Pap smear: No - age 30-64 HPV with reflex Pap every 5 years recommended        6/14/2018    12:00 AM   PAP / HPV   PAP-ABSTRACT See Scanned Document           This result is from an external source.     ASCVD Risk   The 10-year ASCVD risk score (Talita LE, et al., 2019) is: 3.4%    Values used to calculate the score:      Age: 53 years      Sex: Female      Is Non- : No      Diabetic: Yes      Tobacco smoker: No      Systolic Blood Pressure: 122 mmHg      Is BP treated: Yes      HDL Cholesterol: 54 mg/dL      Total Cholesterol: 176 mg/dL           Reviewed and updated as needed this visit by Provider                    Past Medical History:   Diagnosis Date    Allergic Rhinitis, Seasonal 04/25/2011    Asthma 01/01/2011    Diabetes (H)     Hypertension, Benign 04/25/2011    Hypothyroidism 01/01/2011    Nontoxic uninodular goiter 11/28/2007    Prediabetes 04/25/2011    Uncomplicated asthma      Past Surgical History:   Procedure Laterality Date    CHOLECYSTECTOMY  01/01/1996    eye surgery for strabismus at ages 2 & 4      HYSTERECTOMY TOTAL ABDOMINAL, SALPINGECTOMY Bilateral 07/02/2018    [facility]. Ovaries remain. 10 cm uterine mass removed: well circumscribed mass consistent with leiomyoma.    HYSTERECTOMY, PAP NO LONGER INDICATED N/A 07/02/2018    Cervix removed per Hyst path report.     OB History   No obstetric history on file.     BP Readings from Last 3 Encounters:   08/15/24 122/84   07/31/24 126/84   07/23/24 121/82    Wt Readings from Last 3 Encounters:   08/15/24 81.1 kg (178 lb 14.4 oz)   07/31/24 81.2 kg (179 lb)   07/23/24 81.9 kg (180 lb 8 oz)                  Patient Active Problem List   Diagnosis    Hypothyroidism, acquired    Asthma, mild intermittent    Allergic rhinitis due to pollen    HTN (hypertension)    Nontoxic uninodular goiter    Obesity    Type 2 diabetes mellitus without complication, without  long-term current use of insulin (H)    Achilles tendon tear    Asthma    Chronic lymphocytic thyroiditis    Hypertension    Adjustment disorder with anxious mood    Abnormal platelets (H)     Past Surgical History:   Procedure Laterality Date    CHOLECYSTECTOMY  01/01/1996    eye surgery for strabismus at ages 2 & 4      HYSTERECTOMY TOTAL ABDOMINAL, SALPINGECTOMY Bilateral 07/02/2018    [facility]. Ovaries remain. 10 cm uterine mass removed: well circumscribed mass consistent with leiomyoma.    HYSTERECTOMY, PAP NO LONGER INDICATED N/A 07/02/2018    Cervix removed per Hyst path report.       Social History     Tobacco Use    Smoking status: Never    Smokeless tobacco: Never    Tobacco comments:     no passive exposure   Substance Use Topics    Alcohol use: Not Currently     Comment: socially      Family History   Problem Relation Age of Onset    Other - See Comments Mother         fibromyalgia and endometreosis    Heart Disease Mother         murmer    Allergies Mother         environmental    Diabetes Father     Other - See Comments Sister         endometriosis    Allergies Sister         environmental    Breast Cancer Paternal Grandmother         over 50    Hypertension Brother     Allergies Brother         environmental    Breast Cancer Maternal Aunt         early 50's    Breast Cancer Other     Asthma No family hx of     Thyroid Disease No family hx of          Current Outpatient Medications   Medication Sig Dispense Refill    ACCU-CHEK GUIDE test strip TEST ONCE DAILY 100 strip 3    amLODIPine (NORVASC) 10 MG tablet Take 1 tablet (10 mg) by mouth daily 90 tablet 2    aspirin 81 MG EC tablet Take 81 mg by mouth daily      blood glucose monitoring (SOFTCLIX) lancets Testing once daily 100 each 3    chlorthalidone (HYGROTON) 25 MG tablet Take 1 tablet (25 mg) by mouth daily 90 tablet 0    dulaglutide (TRULICITY) 1.5 MG/0.5ML pen Inject 1.5 mg subcutaneously every 7 days 6 mL 3    empagliflozin (JARDIANCE)  25 MG TABS tablet Take 1 tablet (25 mg) by mouth daily 90 tablet 1    fluticasone (FLONASE) 50 MCG/ACT nasal spray SHAKE LIQUID, THEN INSTILL 2 SPRAYS INTO EACH NOSTRIL DAILY 48 g 1    levalbuterol (XOPENEX HFA) 45 MCG/ACT inhaler Inhale 2 puffs into the lungs every 4 hours as needed for wheezing or shortness of breath 45 g 3    levothyroxine (SYNTHROID/LEVOTHROID) 150 MCG tablet Take 125 mcg by mouth daily      lisinopril (ZESTRIL) 10 MG tablet Take 1 tablet (10 mg) by mouth 2 times daily 180 tablet 3    loratadine (CLARITIN) 10 MG tablet Take 10 mg by mouth daily as needed for allergies      metFORMIN (GLUCOPHAGE) 1000 MG tablet Take 1 tablet (1,000 mg) by mouth 2 times daily (with meals) 180 tablet 1    metoprolol succinate ER (TOPROL XL) 100 MG 24 hr tablet TAKE 1 TABLET(100 MG) BY MOUTH DAILY IN THE MORNING 90 tablet 1    metoprolol succinate ER (TOPROL XL) 50 MG 24 hr tablet TAKE 1 TABLET(50 MG) BY MOUTH DAILY IN THE AFTERNOON 90 tablet 1    Multiple Vitamin (MULTIVITAMIN ADULT PO) Take 1 tablet by mouth daily       Allergies   Allergen Reactions    Amoxicillin Trihydrate Itching     Augmentin      Clavulanic Acid Potassium Itching     Augmentin      Levaquin [Levofloxacin] Swelling    Penicillins Hives     Recent Labs   Lab Test 03/03/23  0821 04/13/22  1648 09/23/21  0730 08/07/19  0000 06/03/19  0905 04/08/19  0924 06/25/18  0942   A1C 7.1* 6.6* 6.3*   < >  --  7.1* 6.5*   LDL 52  --  69  --   --  78  --    HDL 54  --  53  --   --  73  --    TRIG 348*  --  279*  --   --  235*  --    ALT 42* 75* 170*  --   --   --   --    CR 1.09* 0.68 0.66   < >  --   --  0.65   GFRESTIMATED 61 >90 >90   < > >90  --  >90   GFRESTBLACK  --   --   --   --  >90  --  >90   POTASSIUM 3.2* 3.5 4.7   < >  --   --  4.0   TSH 0.50 1.90 2.29   < >  --   --  0.14*    < > = values in this interval not displayed.          Review of Systems  Constitutional, neuro, ENT, endocrine, pulmonary, cardiac, gastrointestinal, genitourinary,  "musculoskeletal, integument and psychiatric systems are negative, except as otherwise noted.     Objective    Exam  /84 (BP Location: Left arm, Patient Position: Sitting, Cuff Size: Adult Large)   Pulse 76   Temp 97.8  F (36.6  C) (Tympanic)   Resp 18   Ht 1.549 m (5' 1\")   Wt 81.1 kg (178 lb 14.4 oz)   LMP 03/07/2015 (Exact Date)   SpO2 100%   BMI 33.80 kg/m     Estimated body mass index is 33.8 kg/m  as calculated from the following:    Height as of this encounter: 1.549 m (5' 1\").    Weight as of this encounter: 81.1 kg (178 lb 14.4 oz).    Physical Exam  GENERAL: alert and no distress  EYES: Eyes grossly normal to inspection, PERRL and conjunctivae and sclerae normal  HENT: ear canals and TM's normal, nose and mouth without ulcers or lesions  NECK: no adenopathy, no asymmetry, masses, or scars  RESP: lungs clear to auscultation - no rales, rhonchi or wheezes  CV: regular rate and rhythm, normal S1 S2, no S3 or S4, no murmur, click or rub, no peripheral edema  ABDOMEN: soft, nontender, no hepatosplenomegaly, no masses and bowel sounds normal  MS: no gross musculoskeletal defects noted, no edema  SKIN: no suspicious lesions or rashes  NEURO: Normal strength and tone, mentation intact and speech normal  PSYCH: mentation appears normal, affect normal/bright  Diabetic foot exam: normal DP and PT pulses, no trophic changes or ulcerative lesions, and normal sensory exam        Signed Electronically by: LALY Ortega    "

## 2024-09-12 ENCOUNTER — MYC MEDICAL ADVICE (OUTPATIENT)
Dept: FAMILY MEDICINE | Facility: OTHER | Age: 54
End: 2024-09-12

## 2024-09-12 LAB — NONINV COLON CA DNA+OCC BLD SCRN STL QL: NEGATIVE

## 2024-09-20 ENCOUNTER — ANCILLARY PROCEDURE (OUTPATIENT)
Dept: MAMMOGRAPHY | Facility: OTHER | Age: 54
End: 2024-09-20
Attending: PHYSICIAN ASSISTANT
Payer: COMMERCIAL

## 2024-09-20 DIAGNOSIS — Z12.31 VISIT FOR SCREENING MAMMOGRAM: ICD-10-CM

## 2024-09-20 PROCEDURE — 77063 BREAST TOMOSYNTHESIS BI: CPT | Mod: TC | Performed by: RADIOLOGY

## 2024-09-20 PROCEDURE — 77067 SCR MAMMO BI INCL CAD: CPT | Mod: TC | Performed by: RADIOLOGY

## 2024-09-23 ENCOUNTER — TELEPHONE (OUTPATIENT)
Dept: MAMMOGRAPHY | Facility: OTHER | Age: 54
End: 2024-09-23

## 2024-10-02 DIAGNOSIS — J30.1 SEASONAL ALLERGIC RHINITIS DUE TO POLLEN: ICD-10-CM

## 2024-10-02 RX ORDER — FLUTICASONE PROPIONATE 50 MCG
SPRAY, SUSPENSION (ML) NASAL
Qty: 48 G | Refills: 1 | Status: SHIPPED | OUTPATIENT
Start: 2024-10-02

## 2024-10-25 ENCOUNTER — HOSPITAL ENCOUNTER (OUTPATIENT)
Dept: EDUCATION SERVICES | Facility: HOSPITAL | Age: 54
Discharge: HOME | End: 2024-10-25
Attending: PHYSICIAN ASSISTANT | Admitting: PHYSICIAN ASSISTANT
Payer: COMMERCIAL

## 2024-10-25 VITALS
OXYGEN SATURATION: 98 % | HEART RATE: 81 BPM | RESPIRATION RATE: 16 BRPM | DIASTOLIC BLOOD PRESSURE: 77 MMHG | WEIGHT: 179.2 LBS | HEIGHT: 62 IN | SYSTOLIC BLOOD PRESSURE: 112 MMHG | BODY MASS INDEX: 32.97 KG/M2

## 2024-10-25 DIAGNOSIS — E11.9 TYPE 2 DIABETES MELLITUS WITHOUT COMPLICATION, WITHOUT LONG-TERM CURRENT USE OF INSULIN (H): Primary | ICD-10-CM

## 2024-10-25 LAB — HBA1C MFR BLD: 6.9 % (ref 4.3–?)

## 2024-10-25 PROCEDURE — G0108 DIAB MANAGE TRN  PER INDIV: HCPCS

## 2024-10-25 PROCEDURE — 83036 HEMOGLOBIN GLYCOSYLATED A1C: CPT | Performed by: PHYSICIAN ASSISTANT

## 2024-10-25 ASSESSMENT — PAIN SCALES - GENERAL: PAINLEVEL_OUTOF10: NO PAIN (0)

## 2024-10-25 NOTE — PROGRESS NOTES
"Diabetes Self-Management Education & Support    Presents for: Follow-up    Type of Service: In Person Visit    ASSESSMENT:    María, 53 year old, returns to [facility] for follow up, A1c update.    Shares \"a lot has happened since last visit\".  Was at niece's wedding (4 hours from home)- and mother called and said she thought she broke her hip and had called María accidentally, was trying to call for the ambulance. María shares she and her son drove 4 hours home and then another 8 hours to get to her mom to help her. Was there for a week to help care for dad. Then spouse went to stay and help for a week.  Spouse has medical concerns. Shoulder pain. New kitten, starting to get acclimated with other cats in home.      Feels tired from last several months, \"I need a break.\" Talked about managing stress.   Hasn't been able to be as active. Has had long drive time for work, up to 2 hours one way. Site visits will be closer to home in the coming couple of months, hoping for more time to walk.   Will be going to [facility] for [employer], looking forward to this trip.    Change in eating patterns.     A1C:  6.9    Target A1C: <7.0  Previous A1C: 6.4   Accu-check guide me.    Meter Report: 2 week average 157. 119-194.  Has noticed last 2 weeks, numbers running higher. Discussed stress on BG levels.     Trulicity 1.5 mg weekly  Metformin 1000 twice daily   Jardiance 25 mg daily     Wt Readings from Last 10 Encounters:   10/25/24 81.3 kg (179 lb 3.2 oz)   08/15/24 81.1 kg (178 lb 14.4 oz)   07/31/24 81.2 kg (179 lb)   07/23/24 81.9 kg (180 lb 8 oz)   04/09/24 82.1 kg (181 lb)   01/04/24 79.2 kg (174 lb 11.2 oz)   09/26/23 82 kg (180 lb 11.2 oz)   09/06/23 82.6 kg (182 lb 3.2 oz)   06/22/23 85.3 kg (188 lb)   04/24/23 85.2 kg (187 lb 14.4 oz)     BP meeting ADA target guideline. Statin use, no,  defer to PCP for recommendations.  ASA use. Tobacco use- no.    Foot exam current, denies new concerns.  Eye exam- current, report " requested today.   PCP: LALY Ortiz. Will be est with Dr Harrison   PCP follow up scheduled - August to est care.    Insurance Coverage: [employer].     Refills needed: none today. Pharmacy: [facility] in [place].       Labs: BMP Microalbumin Lipids - due, pcp has ordered. Pt agrees to schedule a fasting appointment.     Noninvasive Liver Fibrosis Assessment:  defer to pcp for further recommendations.   Computed FIB-4 Calculation unavailable. One or more values for this score either were not found within the given timeframe or did not fit some other criterion.      Allergies   Allergen Reactions    Amoxicillin Trihydrate Itching     Augmentin      Clavulanic Acid Potassium Itching     Augmentin      Levaquin [Levofloxacin] Swelling    Penicillins Hives            Patient's most recent   Lab Results   Component Value Date    A1C 7.1 03/03/2023    A1C 7.1 08/27/2020    HEMOGLOBINA1 6.9 10/25/2024     is meeting goal of <7.0    Diabetes knowledge and skills assessment:   Patient is knowledgeable in diabetes management concepts related to: Healthy Eating, Being Active, Monitoring, and Taking Medication  Continue education with the following diabetes management concepts: Reducing Risks and Healthy Coping  Based on learning assessment above, most appropriate setting for further diabetes education would be: Individual setting.      PLAN  Continue current med reg for now.   Labs- scheduled.   Stress management.  Activity.   Return 3 m for A1C update.  Scheduled 1/27/2025.  Scheduled to est care with new PCP in August.   See Care Plan for co-developed, patient-state behavior change goals.  AVS provided for patient today.    Education Materials Provided:  No new materials provided today      SUBJECTIVE/OBJECTIVE:  Presents for: Follow-up  Accompanied by: Self  Diabetes education in the past 24mo: Yes  Focus of Visit: Monitoring, Diabetes Pathophysiology, Taking Medication  Diabetes type: Type 2  Date of  "diagnosis: August 2017  Disease course: Stable  How confident are you filling out medical forms by yourself:: Extremely  Diabetes management related comments/concerns: stress ?causing increase in glucose last several weeks.  Transportation concerns: No  Difficulty affording diabetes medication?: No  Difficulty affording diabetes testing supplies?: No  Other concerns:: None  Cultural Influences/Ethnic Background:  Not  or     Diabetes Symptoms & Complications:  Diabetes Related Symptoms: Fatigue  Weight trend: Stable  Symptom course: Stable  Disease course: Stable  Complications assessed today?: Yes  Autonomic neuropathy: No  CVA: No  Heart disease: Other (HTN)  Nephropathy: No  Peripheral neuropathy: No  Foot ulcerations: No  Peripheral Vascular Disease: No  Retinopathy: No  Sexual dysfunction: No    Patient Problem List and Family Medical History reviewed for relevant medical history, current medical status, and diabetes risk factors.    Vitals:  /77   Pulse 81   Resp 16   Ht 1.562 m (5' 1.5\")   Wt 81.3 kg (179 lb 3.2 oz)   LMP 03/07/2015 (Exact Date)   SpO2 98%   BMI 33.31 kg/m    Estimated body mass index is 33.31 kg/m  as calculated from the following:    Height as of this encounter: 1.562 m (5' 1.5\").    Weight as of this encounter: 81.3 kg (179 lb 3.2 oz).   Last 3 BP:   BP Readings from Last 3 Encounters:   10/25/24 112/77   08/15/24 122/84   07/31/24 126/84       History   Smoking Status    Never   Smokeless Tobacco    Never       Labs:  Lab Results   Component Value Date    A1C 7.1 03/03/2023    A1C 7.1 08/27/2020    HEMOGLOBINA1 6.9 10/25/2024     Lab Results   Component Value Date     03/03/2023     04/13/2022     06/25/2018     Lab Results   Component Value Date    LDL 52 03/03/2023    LDL 78 04/08/2019     HDL Cholesterol   Date Value Ref Range Status   04/08/2019 73 >49 mg/dL Final     Direct Measure HDL   Date Value Ref Range Status   03/03/2023 54 >=50 " "mg/dL Final   ]  GFR Estimate   Date Value Ref Range Status   03/03/2023 61 >60 mL/min/1.73m2 Final     Comment:     eGFR calculated using 2021 CKD-EPI equation.   06/03/2019 >90 >60 mL/min/[1.73_m2] Final     GFR Estimate If Black   Date Value Ref Range Status   06/03/2019 >90 >60 mL/min/[1.73_m2] Final     Lab Results   Component Value Date    CR 1.09 03/03/2023    CR 0.65 06/25/2018     No results found for: \"MICROALBUMIN\"    Healthy Eating:  Healthy Eating Assessed Today: Yes  Cultural/Worship diet restrictions?: No  Do you have any food allergies or intolerances?: Yes  Please list your food allergies / intolerances: watermelon. shellfish- rash.   dairy intolerance but does drink milk/tolerable.  Meal planning/habits: Avoiding sweets, Low carb, Smaller portions  Who cooks/prepares meals for you?: Self, Spouse  Who purchases food in  your home?: Self, Spouse  How many times a week on average do you eat food made away from home (restaurant/take-out)?: 1  Meals include: Breakfast, Dinner, Morning Snack, Afternoon Snack  Breakfast: cereal with milk. sometimes bagel or toast. bigger breakfast-eggs. Polish toast.  Lunch: most the time smaller. lately- ham sandwich. left overs. fruit, yogurt. pizza left overs. chicken salad last night.  Dinner: pizzas. chicken cordon victorino. rice. mashed potatoes. meatloaf.  Snacks: pretzels. chi tea.  Other: ice cream  Beverages: Water, Milk  Has patient met with a dietitian in the past?: No    Being Active:  Being Active Assessed Today: Yes  Exercise:: Yes  Days per week of moderate to strenuous exercise (like a brisk walk): 5  On average, minutes per day of exercise at this level: 30  How intense was your typical exercise? : Light (like stretching or slow walking)  Exercise Minutes per Week: 150  Barrier to exercise: Time    Monitoring:  Monitoring Assessed Today: Yes  Did patient bring glucose meter to appointment? : Yes  Blood Glucose Meter: Accu-chek  Times checking blood sugar " at home (number): 5+  Times checking blood sugar at home (per): Week  Blood glucose trend: Fluctuating    Taking Medications:  Diabetes Medication(s)       Biguanides       metFORMIN (GLUCOPHAGE) 1000 MG tablet Take 1 tablet (1,000 mg) by mouth 2 times daily (with meals)       Sodium-Glucose Co-Transporter 2 (SGLT2) Inhibitors       empagliflozin (JARDIANCE) 25 MG TABS tablet Take 1 tablet (25 mg) by mouth daily       Incretin Mimetic Agents       dulaglutide (TRULICITY) 1.5 MG/0.5ML pen Inject 1.5 mg subcutaneously every 7 days            Taking Medication Assessed Today: Yes  Current Treatments: Non-insulin Injectables, Oral Medication (taken by mouth) (Trulicity 1.5 mg weekly (highest tolerated dose). Metformin 1000 mg twice daily. Jardiance 25 mg daily.)  Problems taking diabetes medications regularly?: No  Diabetes medication side effects?: No    Problem Solving:  Problem Solving Assessed Today: Yes  Is the patient at risk for hypoglycemia?: No  Hypoglycemia Frequency: Rarely (nausea. hunger pain/dizzy if goes too long.)  Hypoglycemia Treatment: Candy (small candy bars.)  Patient carries a carbohydrate source: No  Medical ID: No  Does patient have DKA prevention plan?: No  Does patient have severe weather/disaster plan for diabetes management?: No    Hypoglycemia Symptoms  Hypoglycemia: Dizziness/Lightheadedness, Headaches    Hypoglycemia Complications  Hypoglycemia Complications: None    Reducing Risks:  Reducing Risks Assessed Today: Yes  Diabetes Risks: Age over 45 years  CAD Risks: Diabetes Mellitus, Obesity  Has dilated eye exam at least once a year?: Yes (report requested. ADRIANA signed today.)  Sees dentist every 6 months?: Yes  Feet checked by healthcare provider in the last year?: No    Healthy Coping:  Healthy Coping Assessed Today: Yes  Emotional response to diabetes: Concern for health and well-being  Informal Support system:: Children, Family, Friends, Parent, Spouse  Stage of change: MAINTENANCE  (Working to maintain change, with risk of relapse)  Support resources: None  Patient Activation Measure Survey Score:      12/19/2018    10:00 AM 11/9/2022     3:59 PM   JAIME Score (Last Two)   JAIME Raw Score 40 33   Activation Score 100 65.8   JAIME Level 4 3     Time Spent: 30 minutes  Encounter Type: Individual    Any diabetes medication dose changes were made via the CDE Protocol per the patient's primary care provider. A copy of this encounter was shared with the provider.    Reshma Sherman RN [facility]   [phone]  10/25/2024 at 3:11 PM

## 2024-10-25 NOTE — Clinical Note
Dutch Esteves! Trinidad was in today- family stressors in the last couple months- numbers running slightly higher. A1c 6.9.  We talked about stress management- will see her again in January. We scheduled her  labs too that you have ordered. :) Thanks alisson

## 2024-10-25 NOTE — PATIENT INSTRUCTIONS
"Five Goals to Manage Diabetes:  1) Control Blood Pressure: <140/90, <130/80 if able to safely reach  2) Lower LDL \"bad\" cholesterol  <70   3) Maintain blood sugar- individual targets vary  4) Be tobacco free  5) Take Aspirin as recommended- talk to your primary care provider if this is right for you.     Monitoring:  Your A1C was 6.9. Goal is less than <7.0   Previous A1C: 6.4   Next A1C: January     Check blood sugars 1/day. Alternating with fasting in the morning and/or 2 hours after your largest meal are good times to check.    Target blood sugar: Fasting or before meals target is . 2 hours after meal <180.    We only need 50% of these numbers to be within target for your A1c will be within/close to target.    Medications:   Trulicity 1.5 mg weekly  Jardiance 25 mg daily  Metformin 1000 mg twice daily.      Healthy Eating:      diabetesfoodhub.org     Target Carb:    Women 30-45 grams/meal 15-30 grams carbs optional snack.   Less if working towards weight loss.     Suggest having a protein and/or fiber with carb option (slows down how fast sugar/glucose is released into the blood stream causing the spikes).     Mediterranean Lifestyle: Fish/seafood 2 times a week, vegetables, fruit, nuts, legumes, whole grains, water, regular activity.    Eat a Healthy diet  Eat more vegetables/plants in your diet  Eat healthy fats  Olive oil  Avocados  Eat healthy proteins  Poultry without the skin  Fish  Limit red meat     Limit higher carbohydrate foods such as: rice, breads, cereal, pasta, sweets. (Mindful portion sizes).   Avoid sugary drinks: regular soda/pop, juice, sports drinks. (Sugar free, zero are okay).     Snacks: Try to  work on healthy, low carbohydrate food choices. Some good snack examples:   -Greek yogurt  -Protein shake  -Few crackers/slice of cheese  -Apple/peanut butter  -Hardboiled egg/wheat thins  -Almonds or alternate nuts with berries  -Steak bites   -Small wrap sandwich  -Adult lunchable  -Serving " of oatmeal with nuts or peanut butter   -Smoothie drink with portion of Greek yogurt, ice, fruit of choice  -Trail mix  -fresh veggie  -fruit/berries- pair with protein like Greek yogurt, cheese, cottage cheese, hardboiled egg.    Activity/Exercise:     Any prolonged sedentary/sitting activity should be interrupted every 30 minutes- not only diabetes benefits-it also lowers some cancer risk too!  Consider walking or other form of activity for 5-10 minutes after eating meals.      Adult goal is 150 minutes/week =  30 minutes 5 days of the week.   Aerobic activity 150 minute a week  2 days of resistance exercising     Start low and slow, work up to 30 min, 5x/week.   Increase exercise as tolerated, even multiple short times during your day helps.   A SMART Goal can help support your journey.      Healthy Coping:     Practicing health promotion can help improve diabetes (suggested by the ADA, March 2019)              Meditation                          Apps: for IPhone and Android                          -Calm                          -Headspace                          -Insight Timer              Gentle stretching              Mindful eating       Other:   Foot exam: yearly. In the meantime, check your feet daily looking for new blisters or wounds, wearing shoes at all times when walking including around the house, and avoiding lotion application between the toes. If there are any signs of infection, contact your primary care provider.  Infections of the foot can be life threatening or lead to amputations of the foot or leg. You can use a mirror to look over your feet is you are not able to bend to get a good look.     Eye exam: yearly    Encourage regular dental check ups.    Consider setting a goal: (SMART) specific, measurable, attainable, realistic and Timely) goals (suggested by ADA 2023)    Labs- due: basic metabolic panel, lipids/fasting, TSH, Microalbumin- PCP has ordered.    Follow up: 1/27/2025 at 3 pm for  A1C update.   Please bring your meter/reader to your appointment.     If you have any questions or concerns, please call me at [phone] (Alicia, Nurse directly) or send Node1 message.    Scheduling Number: [phone]    Thank you for coming in today!  Reshma Sherman RN [facility]  Diabetes Educator

## 2024-10-29 ENCOUNTER — TELEPHONE (OUTPATIENT)
Dept: FAMILY MEDICINE | Facility: OTHER | Age: 54
End: 2024-10-29

## 2024-10-29 NOTE — TELEPHONE ENCOUNTER
Attempt # 1  Outcome: Left Message   Comment: Lvm for pt to call and schedule an apt with Linn. Please offer 11/4 at 1 pm or 11/7 at 2 pm per nurse, for elevated A1C

## 2024-11-07 ENCOUNTER — OFFICE VISIT (OUTPATIENT)
Dept: FAMILY MEDICINE | Facility: OTHER | Age: 54
End: 2024-11-07
Attending: PHYSICIAN ASSISTANT
Payer: COMMERCIAL

## 2024-11-07 VITALS
BODY MASS INDEX: 32.68 KG/M2 | TEMPERATURE: 96.1 F | HEIGHT: 62 IN | OXYGEN SATURATION: 97 % | SYSTOLIC BLOOD PRESSURE: 115 MMHG | HEART RATE: 75 BPM | RESPIRATION RATE: 16 BRPM | WEIGHT: 177.6 LBS | DIASTOLIC BLOOD PRESSURE: 79 MMHG

## 2024-11-07 DIAGNOSIS — E11.65 TYPE 2 DIABETES MELLITUS WITH HYPERGLYCEMIA, WITHOUT LONG-TERM CURRENT USE OF INSULIN (H): ICD-10-CM

## 2024-11-07 DIAGNOSIS — I10 PRIMARY HYPERTENSION: ICD-10-CM

## 2024-11-07 DIAGNOSIS — D69.1 ABNORMAL PLATELETS (H): ICD-10-CM

## 2024-11-07 DIAGNOSIS — E03.9 ACQUIRED HYPOTHYROIDISM: ICD-10-CM

## 2024-11-07 DIAGNOSIS — E66.01 MORBID OBESITY (H): ICD-10-CM

## 2024-11-07 DIAGNOSIS — I10 BENIGN ESSENTIAL HYPERTENSION: ICD-10-CM

## 2024-11-07 DIAGNOSIS — E11.9 TYPE 2 DIABETES MELLITUS WITHOUT COMPLICATION, WITHOUT LONG-TERM CURRENT USE OF INSULIN (H): Primary | ICD-10-CM

## 2024-11-07 PROCEDURE — 99214 OFFICE O/P EST MOD 30 MIN: CPT | Performed by: PHYSICIAN ASSISTANT

## 2024-11-07 RX ORDER — DULAGLUTIDE 3 MG/.5ML
3 INJECTION, SOLUTION SUBCUTANEOUS
Qty: 2 ML | Refills: 2 | Status: SHIPPED | OUTPATIENT
Start: 2024-11-07 | End: 2024-11-07

## 2024-11-07 RX ORDER — DULAGLUTIDE 3 MG/.5ML
3 INJECTION, SOLUTION SUBCUTANEOUS
Qty: 2 ML | Refills: 2 | Status: SHIPPED | OUTPATIENT
Start: 2024-11-07

## 2024-11-07 NOTE — PROGRESS NOTES
Assessment & Plan     Type 2 diabetes mellitus without complication, without long-term current use of insulin (H)  She had her A1C done she is slowly climbing. Her anxiety is at maximum. Many life stressors.  She is going to be given refill of her medications. Bump on her Trulicity. She will be seeing us back in 3 months.  Reshma will be seeing her back as well.     Primary hypertension  Perfect pressure.     Hypothyroidism, acquired  Seeing Endocrine.  Her level is good we will have her get labs and they have been ordered.             See Patient Instructions    No follow-ups on file.    Subjective   Trinidad is a 53 year old, presenting for the following health issues:  Diabetes        11/7/2024     1:39 PM   Additional Questions   Roomed by rodrigo ha   Accompanied by none         11/7/2024     1:39 PM   Patient Reported Additional Medications   Patient reports taking the following new medications none     History of Present Illness       Diabetes:   She presents for follow up of diabetes.  She is checking home blood glucose one time daily.   She checks blood glucose before meals.  Blood glucose is never over 200 and never under 70.  When her blood glucose is low, the patient is asymptomatic for confusion, blurred vision, lethargy and reports not feeling dizzy, shaky, or weak.  She is concerned about other.    She is not experiencing numbness or burning in feet, excessive thirst, blurry vision, weight changes or redness, sores or blisters on feet.           She eats 2-3 servings of fruits and vegetables daily.She consumes 1 sweetened beverage(s) daily.She exercises with enough effort to increase her heart rate 10 to 19 minutes per day.  She exercises with enough effort to increase her heart rate 3 or less days per week.   She is taking medications regularly.       Diabetes Follow-up    How often are you checking your blood sugar? One time daily  What time of day are you checking your blood sugars (select all that  apply)?  Before meals  Have you had any blood sugars above 200?  No  Have you had any blood sugars below 70?  No  What symptoms do you notice when your blood sugar is low?  None  What concerns do you have today about your diabetes? Other: slowly increasing  levels.    Do you have any of these symptoms? (Select all that apply)  No numbness or tingling in feet.  No redness, sores or blisters on feet.  No complaints of excessive thirst.  No reports of blurry vision.  No significant changes to weight.      BP Readings from Last 2 Encounters:   11/07/24 115/79   10/25/24 112/77     Hemoglobin A1C (%)   Date Value   03/03/2023 7.1 (H)   04/13/2022 6.6 (H)   08/27/2020 7.1 (A)   08/07/2019 6.5 (A)     LDL Cholesterol Calculated (mg/dL)   Date Value   03/03/2023 52   09/23/2021 69   04/08/2019 78   08/15/2017 63     Hemoglobin A1C   Date Value Ref Range Status   03/03/2023 7.1 (H) <5.7 % Final     Comment:     Normal <5.7%   Prediabetes 5.7-6.4%    Diabetes 6.5% or higher     Note: Adopted from ADA consensus guidelines.   04/13/2022 6.6 (H) 0.0 - 5.6 % Final     Comment:     Normal <5.7%   Prediabetes 5.7-6.4%    Diabetes 6.5% or higher     Note: Adopted from ADA consensus guidelines.   09/23/2021 6.3 (H) 0.0 - 5.6 % Final     Comment:     Normal <5.7%   Prediabetes 5.7-6.4%    Diabetes 6.5% or higher     Note: Adopted from ADA consensus guidelines.   08/27/2020 7.1 (A) <5.7 % Final   08/07/2019 6.5 (A) <5.7 % Final   04/08/2019 7.1 (H) 0 - 5.6 % Final     Comment:     Normal <5.7% Prediabetes 5.7-6.4%  Diabetes 6.5% or higher - adopted from ADA   consensus guidelines.       Hemoglobin A1C POCT   Date Value Ref Range Status   10/25/2024 6.9 (A) <5.7 % Final   07/23/2024 6.4 (A) <5.7 % Final   04/09/2024 6.3 (A) <5.7 % Final                   Review of Systems  Constitutional, neuro, ENT, endocrine, pulmonary, cardiac, gastrointestinal, genitourinary, musculoskeletal, integument and psychiatric systems are negative, except  "as otherwise noted.      Objective    /79 (BP Location: Right arm, Patient Position: Sitting, Cuff Size: Adult Large)   Pulse 75   Temp (!) 96.1  F (35.6  C) (Tympanic)   Resp 16   Ht 1.562 m (5' 1.5\")   Wt 80.6 kg (177 lb 9.6 oz)   LMP 03/07/2015 (Exact Date)   SpO2 97%   BMI 33.01 kg/m    Body mass index is 33.01 kg/m .  Physical Exam   GENERAL: alert and no distress  EYES: Eyes grossly normal to inspection, PERRL and conjunctivae and sclerae normal  HENT: ear canals and TM's normal, nose and mouth without ulcers or lesions  NECK: no adenopathy, no asymmetry, masses, or scars  RESP: lungs clear to auscultation - no rales, rhonchi or wheezes  CV: regular rate and rhythm, normal S1 S2, no S3 or S4, no murmur, click or rub, no peripheral edema  ABDOMEN: soft, nontender, no hepatosplenomegaly, no masses and bowel sounds normal  MS: no gross musculoskeletal defects noted, no edema  PSYCH: mentation appears normal, affect normal/bright    No results found for any visits on 11/07/24.        Signed Electronically by: LALY Ortega    "

## 2024-11-12 ENCOUNTER — LAB (OUTPATIENT)
Dept: LAB | Facility: OTHER | Age: 54
End: 2024-11-12
Payer: COMMERCIAL

## 2024-11-12 DIAGNOSIS — E03.9 ACQUIRED HYPOTHYROIDISM: ICD-10-CM

## 2024-11-12 DIAGNOSIS — N28.9 RENAL INSUFFICIENCY: Primary | ICD-10-CM

## 2024-11-12 LAB
ALBUMIN SERPL BCG-MCNC: 4.1 G/DL (ref 3.5–5.2)
ALP SERPL-CCNC: 59 U/L (ref 40–150)
ALT SERPL W P-5'-P-CCNC: 27 U/L (ref 0–50)
ANION GAP SERPL CALCULATED.3IONS-SCNC: 12 MMOL/L (ref 7–15)
AST SERPL W P-5'-P-CCNC: 20 U/L (ref 0–45)
BASOPHILS # BLD AUTO: 0.1 10E3/UL (ref 0–0.2)
BASOPHILS NFR BLD AUTO: 1 %
BILIRUB SERPL-MCNC: 1 MG/DL
BUN SERPL-MCNC: 34 MG/DL (ref 6–20)
CALCIUM SERPL-MCNC: 10.1 MG/DL (ref 8.8–10.4)
CHLORIDE SERPL-SCNC: 100 MMOL/L (ref 98–107)
CHOLEST SERPL-MCNC: 183 MG/DL
CREAT SERPL-MCNC: 1.21 MG/DL (ref 0.51–0.95)
CREAT UR-MCNC: 118.8 MG/DL
EGFRCR SERPLBLD CKD-EPI 2021: 53 ML/MIN/1.73M2
EOSINOPHIL # BLD AUTO: 0.3 10E3/UL (ref 0–0.7)
EOSINOPHIL NFR BLD AUTO: 4 %
ERYTHROCYTE [DISTWIDTH] IN BLOOD BY AUTOMATED COUNT: 12 % (ref 10–15)
FASTING STATUS PATIENT QL REPORTED: YES
FASTING STATUS PATIENT QL REPORTED: YES
GLUCOSE SERPL-MCNC: 126 MG/DL (ref 70–99)
HCO3 SERPL-SCNC: 28 MMOL/L (ref 22–29)
HCT VFR BLD AUTO: 39.7 % (ref 35–47)
HDLC SERPL-MCNC: 63 MG/DL
HGB BLD-MCNC: 13.7 G/DL (ref 11.7–15.7)
IMM GRANULOCYTES # BLD: 0 10E3/UL
IMM GRANULOCYTES NFR BLD: 0 %
LDLC SERPL CALC-MCNC: 76 MG/DL
LYMPHOCYTES # BLD AUTO: 2.9 10E3/UL (ref 0.8–5.3)
LYMPHOCYTES NFR BLD AUTO: 37 %
MCH RBC QN AUTO: 31.8 PG (ref 26.5–33)
MCHC RBC AUTO-ENTMCNC: 34.5 G/DL (ref 31.5–36.5)
MCV RBC AUTO: 92 FL (ref 78–100)
MICROALBUMIN UR-MCNC: <12 MG/L
MICROALBUMIN/CREAT UR: NORMAL MG/G{CREAT}
MONOCYTES # BLD AUTO: 0.7 10E3/UL (ref 0–1.3)
MONOCYTES NFR BLD AUTO: 9 %
NEUTROPHILS # BLD AUTO: 3.9 10E3/UL (ref 1.6–8.3)
NEUTROPHILS NFR BLD AUTO: 50 %
NONHDLC SERPL-MCNC: 120 MG/DL
NRBC # BLD AUTO: 0 10E3/UL
NRBC BLD AUTO-RTO: 0 /100
PLATELET # BLD AUTO: 285 10E3/UL (ref 150–450)
POTASSIUM SERPL-SCNC: 3.9 MMOL/L (ref 3.4–5.3)
PROT SERPL-MCNC: 7 G/DL (ref 6.4–8.3)
RBC # BLD AUTO: 4.31 10E6/UL (ref 3.8–5.2)
SODIUM SERPL-SCNC: 140 MMOL/L (ref 135–145)
TRIGL SERPL-MCNC: 220 MG/DL
TSH SERPL DL<=0.005 MIU/L-ACNC: 0.91 UIU/ML (ref 0.3–4.2)
WBC # BLD AUTO: 7.9 10E3/UL (ref 4–11)

## 2024-11-12 PROCEDURE — 82570 ASSAY OF URINE CREATININE: CPT | Performed by: PHYSICIAN ASSISTANT

## 2024-11-12 PROCEDURE — 36415 COLL VENOUS BLD VENIPUNCTURE: CPT | Performed by: PHYSICIAN ASSISTANT

## 2024-11-12 PROCEDURE — 80061 LIPID PANEL: CPT | Performed by: PHYSICIAN ASSISTANT

## 2024-11-12 PROCEDURE — 80050 GENERAL HEALTH PANEL: CPT | Performed by: PHYSICIAN ASSISTANT

## 2024-11-12 PROCEDURE — 82043 UR ALBUMIN QUANTITATIVE: CPT | Performed by: PHYSICIAN ASSISTANT

## 2024-11-12 NOTE — RESULT ENCOUNTER NOTE
"María, your cholesterol is better. But your TGL are still pretty high.  Kidneys , they are worse than last time. Some of this is related to being fasting .  Your kidneys could be \"dry\"  because we make you come in fasting which will make it look like they are not functioning efficiently.  Do you take much Ibuprofen or other type of drug like that? If so stop. Drink more liquid and we will have you recheck your kidneys non fasting. See how they look then.  We will check them in a month or when ever  you have some time as I know you are very busy in your life right now.  You will not have to see me, you just have to do labs. "

## 2024-11-16 DIAGNOSIS — I10 BENIGN ESSENTIAL HYPERTENSION: ICD-10-CM

## 2024-11-18 RX ORDER — METOPROLOL SUCCINATE 100 MG/1
TABLET, EXTENDED RELEASE ORAL
Qty: 90 TABLET | Refills: 3 | Status: SHIPPED | OUTPATIENT
Start: 2024-11-18

## 2024-11-18 RX ORDER — METOPROLOL SUCCINATE 50 MG/1
TABLET, EXTENDED RELEASE ORAL
Qty: 90 TABLET | Refills: 3 | Status: SHIPPED | OUTPATIENT
Start: 2024-11-18

## 2024-11-18 NOTE — TELEPHONE ENCOUNTER
Toprol 100mg      Last Written Prescription Date:  8/15/24  Last Fill Quantity: 90,   # refills: 1  Last Office Visit: 11/7/24  Future Office visit:       Routing refill request to provider for review/approval because:      Toprol 50mg      Last Written Prescription Date:  8/15/24  Last Fill Quantity: 90,   # refills: 1  Last Office Visit: 11/7/24  Future Office visit:       Routing refill request to provider for review/approval because:

## 2024-12-20 NOTE — TELEPHONE ENCOUNTER
Metformin      Last Written Prescription Date:  09/26/23  Last Fill Quantity: 180,   # refills: 1  Last Office Visit: 09/06/23  Future Office visit:        POD 3---      UO adequate  + flatus, tolerating regular diet well, denies N/V    LAB:       24 06:10 24 19:11   WBC 12.2 (H) 14.6 (H)   Hemoglobin 12.3 10.9 (L)   Hematocrit 36.6 (L) 31.9 (L)   Platelet Count 163 (L) 164       PE:     Afebrile  BP normal    Lungs clear to auscultation  Abdomen soft, flat, bowel sounds present and normal  Wound dressing in place, waterproof barrier intact  Calves nontender, Danielito sign negative bilaterally  Back:  No CVA tenderness     PLAN: Postop care, analgesia as needed, diet as tolerated,  activity as tolerated. Patient planning on discharge:  today .    Prescriptions:   PNV, tylenol, ibuprofen, oxycodone 5 mg #15  Followup plans:   2 wks .

## 2024-12-30 ENCOUNTER — LAB (OUTPATIENT)
Dept: LAB | Facility: OTHER | Age: 54
End: 2024-12-30
Payer: COMMERCIAL

## 2024-12-30 DIAGNOSIS — N28.9 RENAL INSUFFICIENCY: ICD-10-CM

## 2024-12-30 LAB
ANION GAP SERPL CALCULATED.3IONS-SCNC: 18 MMOL/L (ref 7–15)
BUN SERPL-MCNC: 25.2 MG/DL (ref 6–20)
CALCIUM SERPL-MCNC: 9.6 MG/DL (ref 8.8–10.4)
CHLORIDE SERPL-SCNC: 99 MMOL/L (ref 98–107)
CREAT SERPL-MCNC: 1.08 MG/DL (ref 0.51–0.95)
EGFRCR SERPLBLD CKD-EPI 2021: 61 ML/MIN/1.73M2
GLUCOSE SERPL-MCNC: 194 MG/DL (ref 70–99)
HCO3 SERPL-SCNC: 24 MMOL/L (ref 22–29)
POTASSIUM SERPL-SCNC: 3.8 MMOL/L (ref 3.4–5.3)
SODIUM SERPL-SCNC: 141 MMOL/L (ref 135–145)

## 2024-12-30 PROCEDURE — 80048 BASIC METABOLIC PNL TOTAL CA: CPT

## 2024-12-30 PROCEDURE — 36415 COLL VENOUS BLD VENIPUNCTURE: CPT

## 2025-01-06 ENCOUNTER — TELEPHONE (OUTPATIENT)
Dept: FAMILY MEDICINE | Facility: OTHER | Age: 55
End: 2025-01-06

## 2025-01-06 DIAGNOSIS — I10 BENIGN ESSENTIAL HYPERTENSION: ICD-10-CM

## 2025-01-06 RX ORDER — CHLORTHALIDONE 25 MG/1
25 TABLET ORAL DAILY
Qty: 90 TABLET | Refills: 0 | Status: SHIPPED | OUTPATIENT
Start: 2025-01-06

## 2025-01-06 NOTE — TELEPHONE ENCOUNTER
Reason for call:  Medication      Have you contacted your pharmacy? Yes   If patient has contacted Pharmacy and it has been over 72hrs, continue to #2  Medication     chlorthalidone  25 mg  What [facility] do you use? [facility]    Pharmacy told her to call clinic due to [facility]iz closing out prescription .  (Please note that the turn-around-time for prescriptions is 72 business hours; I am sending your request at this time. SEND TO appropriate Care Team Pool )

## 2025-01-06 NOTE — TELEPHONE ENCOUNTER
Chlorthalidone      Last Written Prescription Date:  08/15/2024  Last Fill Quantity: 90,   # refills: 0  Last Office Visit: 11/07/2024  Future Office visit:       Routing refill request to provider for review/approval because:

## 2025-01-09 NOTE — LETTER
Dear Edith,     All of your labwork is stable from 8/28/17, I recommend that you continue prescribed medications including Aspirin 81 mg daily. I have attached your labs for you to review.    Results for orders placed or performed in visit on 08/28/17   Glucose   Result Value Ref Range    Glucose 216 (H) 70 - 99 mg/dL   Hemoglobin A1c   Result Value Ref Range    Hemoglobin A1C 8.8 (H) 4.3 - 6.0 %   Estimated Average Glucose   Result Value Ref Range    Estimated Average Glucose 206 mg/dL       Dr Gonzalo Pino                 [facility], Scopalamine patch in place, back/neck pain

## 2025-01-26 DIAGNOSIS — E11.9 TYPE 2 DIABETES MELLITUS WITHOUT COMPLICATION, WITHOUT LONG-TERM CURRENT USE OF INSULIN (H): ICD-10-CM

## 2025-01-27 RX ORDER — DULAGLUTIDE 3 MG/.5ML
3 INJECTION, SOLUTION SUBCUTANEOUS
Qty: 4 ML | Refills: 0 | Status: SHIPPED | OUTPATIENT
Start: 2025-01-27

## 2025-02-07 ENCOUNTER — HOSPITAL ENCOUNTER (OUTPATIENT)
Dept: EDUCATION SERVICES | Facility: HOSPITAL | Age: 55
Discharge: HOME | End: 2025-02-07
Attending: PHYSICIAN ASSISTANT | Admitting: PHYSICIAN ASSISTANT
Payer: COMMERCIAL

## 2025-02-07 VITALS
HEART RATE: 68 BPM | OXYGEN SATURATION: 97 % | HEIGHT: 62 IN | WEIGHT: 180.7 LBS | RESPIRATION RATE: 16 BRPM | SYSTOLIC BLOOD PRESSURE: 112 MMHG | BODY MASS INDEX: 33.25 KG/M2 | DIASTOLIC BLOOD PRESSURE: 78 MMHG

## 2025-02-07 DIAGNOSIS — E11.9 TYPE 2 DIABETES MELLITUS WITHOUT COMPLICATION, WITHOUT LONG-TERM CURRENT USE OF INSULIN (H): Primary | ICD-10-CM

## 2025-02-07 LAB — HBA1C MFR BLD: 6.6 % (ref 4.3–?)

## 2025-02-07 PROCEDURE — G0108 DIAB MANAGE TRN  PER INDIV: HCPCS

## 2025-02-07 PROCEDURE — 83036 HEMOGLOBIN GLYCOSYLATED A1C: CPT | Performed by: PHYSICIAN ASSISTANT

## 2025-02-07 RX ORDER — DULAGLUTIDE 3 MG/.5ML
3 INJECTION, SOLUTION SUBCUTANEOUS
Qty: 4 ML | Refills: 6 | Status: SHIPPED | OUTPATIENT
Start: 2025-02-07

## 2025-02-07 ASSESSMENT — PAIN SCALES - GENERAL: PAINLEVEL_OUTOF10: NO PAIN (0)

## 2025-02-07 NOTE — PATIENT INSTRUCTIONS
"Five Goals to Manage Diabetes:  1) Control Blood Pressure: <140/90, <130/80 if able to safely reach  2) Lower LDL \"bad\" cholesterol  <70   3) Maintain blood sugar- individual targets vary  4) Be tobacco free  5) Take Aspirin as recommended- talk to your primary care provider if this is right for you.     Monitoring:  Your A1C was 6.6. Goal is less than <7.0   Previous A1C: 6.9     Check blood sugars 1/day. Alternating with fasting in the morning and/or 2 hours after your largest meal are good times to check.    Target blood sugar: Fasting or before meals target is . 2 hours after meal <180.    We only need 50% of these numbers to be within target for your A1c will be within/close to target.    Medications:   No change- refilled today.      Healthy Eating:  (avoid food/drink allergies or intolerances if you have any in the following list):     diabetesfoodhub.org  Website for diabetes friendly recipes.   JustShareIt Website for budget friendly recipes.      Women: Daily fiber intake   19-50 years  20-28 grams  Over 50 years  21 grams    Men: Daily fiber intake  38 grams for those 50 years old or younger  30 grams for those older than 50    Protein goal (weight/2.2)  X  0.8-1.2   Kidney disease (weight/2.2) x 0.6-0.8     Target Carb:   Men 45-60 grams/meal 15-30 grams optional snacks.   Women 30-45 grams/meal 15-30 grams carbs optional snack.   Less if working towards weight loss.     Suggest having a protein and/or fiber with carb option (slows down how fast sugar/glucose is released into the blood stream causing the spikes).     Mediterranean Lifestyle: Fish/seafood 2 times a week, vegetables, fruit, nuts, legumes, whole grains, water, regular activity.    Eat a Healthy diet  Eat more vegetables/plants in your diet  Eat healthy fats  Olive oil  Avocados  Eat healthy proteins  Poultry without the skin  Fish  Limit red meat     Limit higher carbohydrate foods such as: rice, breads, cereal, pasta, sweets. " (Mindful portion sizes).   Avoid sugary drinks: regular soda/pop, juice, sports drinks. (Sugar free, zero are okay).     Snacks: Try to  work on healthy, low carbohydrate food choices.   Some good snack examples:   -Greek yogurt  -Protein shake  -Few crackers/slice of cheese  -Apple/peanut butter  -Hardboiled egg/wheat thins  -Almonds or alternate nuts with berries  -Steak bites   -Small wrap sandwich  -Adult lunchable  -Serving of oatmeal with nuts or peanut butter   -Smoothie drink with portion of Greek yogurt, ice, fruit of choice  -Trail mix  -fresh veggie  -fruit/berries- pair with protein like Greek yogurt, cheese, cottage cheese, hardboiled egg.    Activity/Exercise:     Any prolonged sedentary/sitting activity should be interrupted every 30 minutes- not only diabetes benefits-it also lowers some cancer risk too!  Consider walking or other form of activity for 5-10 minutes after eating meals.      Adult goal is 150 minutes/week =  30 minutes 5 days of the week.   Aerobic activity 150 minute a week  2 days of resistance exercising     Start low and slow, work up to 30 min, 5x/week.   Increase exercise as tolerated, even multiple short times during your day helps.   A SMART Goal can help support your journey.      Healthy Coping:     Practicing health promotion can help improve diabetes (suggested by the ADA, March 2019)              Meditation                          Apps: for ADVIZEhone and AndALTHIA                          -Calm                          -Headspace                          -Insight Timer              Gentle stretching              Mindful eating       Other:   Foot exam: yearly. In the meantime, check your feet daily looking for new blisters or wounds, wearing shoes at all times when walking including around the house, and avoiding lotion application between the toes. If there are any signs of infection, contact your primary care provider.  Infections of the foot can be life threatening or lead  to amputations of the foot or leg. You can use a mirror to look over your feet is you are not able to bend to get a good look.     Eye exam: yearly    Encourage regular dental check ups.    Consider setting a goal: (SMART) specific, measurable, attainable, realistic and Timely) goals (suggested by ADA 2023)    Great work!      Follow up: 8/20/2025 at 3 pm for follow up.      Please bring your meter/reader to your appointment.     If you have any questions or concerns, please call me at [phone] (Alicia, Nurse directly) or send [email or web address] message.    Scheduling Number: [phone]    Thank you for coming in today!  Reshma Sherman RN [facility]  Diabetes Educator

## 2025-02-07 NOTE — PROGRESS NOTES
"Diabetes Self-Management Education & Support    Presents for: Follow-up    Type of Service: In Person Visit    Assessment  Trinidad, 54 year old, returns to [facility] for follow up, A1c update.    Spouse had \"Heart surgery last week, pacemaker.\" Stress has decreased.   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   Activity down - weather.   Daughter is coming for 2 weeks from out of state- looking forward to visit.   No other change with eating habits.     POCT A1C:  6.6    Target A1C: <7.0  Previous A1C: 6.9      Meter Report: 2 week average 115. Range . Testing- [facility]. Trends are decreasing since last visit- Octobe.      Jardiance 25 mg daily  Metformin 1000 mg twice daily  Trulicity 3 mg weekly     Tolerating meds- feels stable. Refills sent today.   Will be establishing care with new PCP following  current PCP intermediate.     BP meeting ADA target guideline. Statin use, no, defer to PCP for recommendations.  ASA use.  Tobacco use- no.    Foot exam current, denies new concerns.  Eye exam- current [facility]- June.    PCP: Linn RUFFIN.     PCP follow up- scheduled with Dr Harrison in August to est care.   Insurance Coverage: [employer].     Refills needed: Trulicity, Jardiance, Metformin, test strips.       [facility]       Labs: BMP Microalbumin Lipids within the last year.     Noninvasive Liver Fibrosis Assessment:  defer to pcp for further recommendations.   FIB-4 Calculation: 0.72 at 11/12/2024  8:01 AM  Calculated from:  SGOT/AST: 20 U/L at 11/12/2024  8:01 AM  SGPT/ALT: 27 U/L at 11/12/2024  8:01 AM  Platelets: 285 10e3/uL at 11/12/2024  8:01 AM  Age: 53 years        Allergies   Allergen Reactions    Amoxicillin Trihydrate Itching     Augmentin      Clavulanic Acid Potassium Itching     Augmentin      Levaquin [Levofloxacin] Swelling    Penicillins Hives      Patient's most recent   Lab Results   Component Value Date    A1C " 7.1 03/03/2023    A1C 7.1 08/27/2020    HEMOGLOBINA1 6.6 02/07/2025     is meeting goal of <7.0    Diabetes knowledge and skills assessment:   Patient is knowledgeable in diabetes management concepts related to: Healthy Eating, Being Active, Monitoring, Taking Medication, and Problem Solving    Based on learning assessment above, most appropriate setting for further diabetes education would be: Individual setting.    Care Plan and Education Provided:  Being Active: Finding a physical activity routine that works for you and Relationship of activity to glucose, Monitoring: Individual glucose targets and Log and interpret results, and Taking Medication: Action of prescribed medication(s) and Side effects of prescribed medication(s)    Patient verbalized understanding of diabetes self-management education concepts discussed, opportunities for ongoing education and support, and recommendations provided today.    Plan    Continue current med plan. Tolerating.   BG monitoring.   Consider working on activity levels. Healthy eating patterns.   Refills sent through August- est care with new PCP in August.   DRC visit- will see after new PCP visit. 8/20/2025 at 3 pm.     Follow-up:  Upcoming Diabetes Ed Appointments     Visit Type Date Time Department    DIABETES ED 2/7/2025  2:00 PM [facility]        See Care Plan for co-developed, patient-state behavior change goals.    Education Materials Provided:  No new materials provided today      Subjective/Objective  Trinidad is an 54 year old year old, presenting for the following diabetes education related to: Presents for: Follow-up  Accompanied by: Self  Diabetes education in the past 24mo: Yes  Focus of Visit: Monitoring, Diabetes Pathophysiology, Taking Medication  Diabetes type: Type 2  Date of diagnosis: August 2017  Disease course: Improving  How confident are you filling out medical forms by yourself:: Extremely  Diabetes management related comments/concerns: noticing BG  "levels decreasing since last visit.  Transportation concerns: No  Difficulty affording diabetes medication?: No  Difficulty affording diabetes testing supplies?: No  Other concerns:: None  Cultural Influences/Ethnic Background:  Not  or     Diabetes Symptoms & Complications:  Diabetes Related Symptoms: None  Weight trend: Stable  Symptom course: Improving  Disease course: Improving  Complications assessed today?: Yes  Autonomic neuropathy: No  CVA: No  Heart disease: Other (HTN)  Nephropathy: No  Peripheral neuropathy: No  Foot ulcerations: No  Peripheral Vascular Disease: No  Retinopathy: No  Sexual dysfunction: No    Patient Problem List and Family Medical History reviewed for relevant medical history, current medical status, and diabetes risk factors.    Vitals:  /78   Pulse 68   Resp 16   Ht 1.562 m (5' 1.5\")   Wt 82 kg (180 lb 11.2 oz)   LMP 03/07/2015 (Exact Date)   SpO2 97%   BMI 33.59 kg/m    Estimated body mass index is 33.59 kg/m  as calculated from the following:    Height as of this encounter: 1.562 m (5' 1.5\").    Weight as of this encounter: 82 kg (180 lb 11.2 oz).   Last 3 BP:   BP Readings from Last 3 Encounters:   02/07/25 112/78   11/07/24 115/79   10/25/24 112/77       History   Smoking Status    Never   Smokeless Tobacco    Never       Labs:  Lab Results   Component Value Date    A1C 7.1 03/03/2023    A1C 7.1 08/27/2020    HEMOGLOBINA1 6.6 02/07/2025     Lab Results   Component Value Date     12/30/2024     04/13/2022     06/25/2018     Lab Results   Component Value Date    LDL 76 11/12/2024    LDL 78 04/08/2019     HDL Cholesterol   Date Value Ref Range Status   04/08/2019 73 >49 mg/dL Final     Direct Measure HDL   Date Value Ref Range Status   11/12/2024 63 >=50 mg/dL Final   ]  GFR Estimate   Date Value Ref Range Status   12/30/2024 61 >60 mL/min/1.73m2 Final     Comment:     eGFR calculated using 2021 CKD-EPI equation.   06/03/2019 >90 >60 " "mL/min/[1.73_m2] Final     GFR Estimate If Black   Date Value Ref Range Status   06/03/2019 >90 >60 mL/min/[1.73_m2] Final     Lab Results   Component Value Date    CR 1.08 12/30/2024    CR 0.65 06/25/2018     No results found for: \"MICROALBUMIN\"    Healthy Eating:  Healthy Eating Assessed Today: Yes  Cultural/Latter day diet restrictions?: No  Do you have any food allergies or intolerances?: Yes  Please list your food allergies / intolerances: watermelon. shellfish- rash.   dairy intolerance but does drink milk/tolerable.  Meal planning/habits: Avoiding sweets, Low carb, Smaller portions  Who cooks/prepares meals for you?: Self, Spouse  Who purchases food in  your home?: Self, Spouse  How many times a week on average do you eat food made away from home (restaurant/take-out)?: 1  Meals include: Breakfast, Dinner, Afternoon Snack  Breakfast: cereal with milk. sometimes bagel or toast. bigger breakfast-eggs. Thai toast.  Lunch: most the time smaller. lately- ham sandwich. left overs. fruit, yogurt. pizza left overs. chicken salad last night.  Dinner: pizzas. chicken cordon victorino. rice. mashed potatoes. meatloaf.  Snacks: pretzels. chi tea.  Other: ice cream  Beverages: Water, Milk, Juice  Has patient met with a dietitian in the past?: No    Being Active:  Being Active Assessed Today: Yes  Exercise:: Yes  Days per week of moderate to strenuous exercise (like a brisk walk): 5  On average, minutes per day of exercise at this level: 30  How intense was your typical exercise? : Light (like stretching or slow walking)  Exercise Minutes per Week: 150  Barrier to exercise: Time    Monitoring:  Monitoring Assessed Today: Yes  Did patient bring glucose meter to appointment? : Yes  Blood Glucose Meter: Accu-chek  Times checking blood sugar at home (number): 1  Times checking blood sugar at home (per): Day  Blood glucose trend: Decreasing    Taking Medications:  Diabetes Medication(s)       Biguanides       metFORMIN (GLUCOPHAGE) " 1000 MG tablet Take 1 tablet (1,000 mg) by mouth 2 times daily (with meals).       Sodium-Glucose Co-Transporter 2 (SGLT2) Inhibitors       empagliflozin (JARDIANCE) 25 MG TABS tablet Take 1 tablet (25 mg) by mouth daily.       Incretin Mimetic Agents       Dulaglutide (TRULICITY) 3 MG/0.5ML SOAJ Inject 3 mg subcutaneously every 7 days.          Taking Medication Assessed Today: Yes  Current Treatments: Non-insulin Injectables, Oral Medication (taken by mouth)  Problems taking diabetes medications regularly?: No  Diabetes medication side effects?: No    Problem Solving:  Problem Solving Assessed Today: Yes  Is the patient at risk for hypoglycemia?: No  Hypoglycemia Frequency: Rarely (nausea. hunger pain/dizzy if goes too long.)  Hypoglycemia Treatment: Candy (small candy bars.)  Patient carries a carbohydrate source: No  Medical ID: No  Does patient have DKA prevention plan?: No  Does patient have severe weather/disaster plan for diabetes management?: No  Hypoglycemia: Dizziness/Lightheadedness, Headaches  Hypoglycemia Complications: None  Reducing Risks:  Reducing Risks Assessed Today: Yes  Diabetes Risks: Age over 45 years  CAD Risks: Diabetes Mellitus, Obesity  Has dilated eye exam at least once a year?: Yes  Sees dentist every 6 months?: Yes  Feet checked by healthcare provider in the last year?: No    Healthy Coping:  Healthy Coping Assessed Today: Yes  Emotional response to diabetes: Concern for health and well-being  Informal Support system:: Children, Family, Friends, Parent, Spouse  Stage of change: MAINTENANCE (Working to maintain change, with risk of relapse)  Support resources: None    Reshma Sherman RN  Time Spent: 30 minutes  Encounter Type: Individual    Any diabetes medication dose changes were made via the CDCES Standing Orders under the patient's referring provider.

## 2025-03-15 DIAGNOSIS — J45.20 MILD INTERMITTENT ASTHMA WITHOUT COMPLICATION: ICD-10-CM

## 2025-03-17 ENCOUNTER — HOSPITAL ENCOUNTER (EMERGENCY)
Facility: HOSPITAL | Age: 55
Discharge: HOME | End: 2025-03-17
Attending: PHYSICIAN ASSISTANT | Admitting: PHYSICIAN ASSISTANT
Payer: COMMERCIAL

## 2025-03-17 ENCOUNTER — APPOINTMENT (OUTPATIENT)
Dept: GENERAL RADIOLOGY | Facility: HOSPITAL | Age: 55
End: 2025-03-17
Attending: PHYSICIAN ASSISTANT
Payer: COMMERCIAL

## 2025-03-17 VITALS
DIASTOLIC BLOOD PRESSURE: 84 MMHG | SYSTOLIC BLOOD PRESSURE: 131 MMHG | OXYGEN SATURATION: 98 % | HEART RATE: 84 BPM | TEMPERATURE: 97.7 F | RESPIRATION RATE: 18 BRPM

## 2025-03-17 DIAGNOSIS — M75.41 ROTATOR CUFF IMPINGEMENT SYNDROME OF RIGHT SHOULDER: ICD-10-CM

## 2025-03-17 PROCEDURE — 99213 OFFICE O/P EST LOW 20 MIN: CPT | Performed by: PHYSICIAN ASSISTANT

## 2025-03-17 PROCEDURE — G0463 HOSPITAL OUTPT CLINIC VISIT: HCPCS

## 2025-03-17 PROCEDURE — 73030 X-RAY EXAM OF SHOULDER: CPT | Mod: RT

## 2025-03-17 ASSESSMENT — ACTIVITIES OF DAILY LIVING (ADL)
ADLS_ACUITY_SCORE: 41
ADLS_ACUITY_SCORE: 41

## 2025-03-17 NOTE — TELEPHONE ENCOUNTER
Xopenex      Last Written Prescription Date:  8/15/24  Last Fill Quantity: 45g,   # refills: 3  Last Office Visit: 2/7/25  Future Office visit:       Routing refill request to provider for review/approval because:

## 2025-03-17 NOTE — ED TRIAGE NOTES
Pt presents with c/o having increased right shoulder pain   Reports has been an ongoing issue for months and has just gotten more painful with time and had decreased ROM   No otc meds taken today

## 2025-03-18 NOTE — ED PROVIDER NOTES
History     Chief Complaint   Patient presents with    Shoulder Pain     HPI  Edith Doty is a 54 year old female who presents for evaluation of right shoulder pain.  Patient's had ongoing symptoms for couple months they have been worsening as of recent.  She has pain with elevation of her hand she denies any weakness numbness or tingling.    Allergies:  Allergies   Allergen Reactions    Amoxicillin Trihydrate Itching     Augmentin      Clavulanic Acid Potassium Itching     Augmentin      Levaquin [Levofloxacin] Swelling    Penicillins Hives       Problem List:    Patient Active Problem List    Diagnosis Date Noted    Abnormal platelets (H) 08/15/2024     Priority: Medium    Adjustment disorder with anxious mood 05/12/2019     Priority: Medium    Type 2 diabetes mellitus without complication, without long-term current use of insulin (H) 08/30/2017     Priority: Medium    Obesity 03/02/2014     Priority: Medium    Allergic rhinitis due to pollen 04/25/2011     Priority: Medium    HTN (hypertension) 04/25/2011     Priority: Medium    Hypothyroidism, acquired 01/01/2011     Priority: Medium    Asthma, mild intermittent 01/01/2011     Priority: Medium    Asthma 10/29/2009     Priority: Medium     Overview:   [facility].       Hypertension 10/29/2009     Priority: Medium     Overview:   [facility] record.       Achilles tendon tear 10/28/2009     Priority: Medium    Chronic lymphocytic thyroiditis 01/28/2009     Priority: Medium    Nontoxic uninodular goiter 11/28/2007     Priority: Medium        Past Medical History:    Past Medical History:   Diagnosis Date    Allergic Rhinitis, Seasonal 04/25/2011    Asthma 01/01/2011    Diabetes (H)     Hypertension, Benign 04/25/2011    Hypothyroidism 01/01/2011    Nontoxic uninodular goiter 11/28/2007    Prediabetes 04/25/2011    Uncomplicated asthma        Past Surgical History:    Past Surgical History:   Procedure Laterality Date    CHOLECYSTECTOMY   01/01/1996    eye surgery for strabismus at ages 2 & 4      HYSTERECTOMY TOTAL ABDOMINAL, SALPINGECTOMY Bilateral 07/02/2018    [facility]. Ovaries remain. 10 cm uterine mass removed: well circumscribed mass consistent with leiomyoma.    HYSTERECTOMY, PAP NO LONGER INDICATED N/A 07/02/2018    Cervix removed per Hy[facility] path report.       Family History:    Family History   Problem Relation Age of Onset    Other - See Comments Mother         fibromyalgia and endometreosis    Heart Disease Mother         murmer    Allergies Mother         environmental    Diabetes Father     Other - See Comments Sister         endometriosis    Allergies Sister         environmental    Breast Cancer Paternal Grandmother         over 50    Hypertension Brother     Allergies Brother         environmental    Breast Cancer Maternal Aunt         early 50's    Breast Cancer Other     Asthma No family hx of     Thyroid Disease No family hx of        Social History:  Marital Status:   [2]  Social History     Tobacco Use    Smoking status: Never    Smokeless tobacco: Never    Tobacco comments:     no passive exposure   Vaping Use    Vaping status: Never Used   Substance Use Topics    Alcohol use: Not Currently     Comment: socially     Drug use: No        Medications:    amLODIPine (NORVASC) 10 MG tablet  aspirin 81 MG EC tablet  blood glucose (ACCU-CHEK GUIDE) test strip  blood glucose monitoring (SOFTCLIX) lancets  chlorthalidone (HYGROTON) 25 MG tablet  Dulaglutide (TRULICITY) 3 MG/0.5ML SOAJ  empagliflozin (JARDIANCE) 25 MG TABS tablet  fluticasone (FLONASE) 50 MCG/ACT nasal spray  levalbuterol (XOPENEX HFA) 45 MCG/ACT inhaler  levothyroxine (SYNTHROID/LEVOTHROID) 150 MCG tablet  lisinopril (ZESTRIL) 10 MG tablet  loratadine (CLARITIN) 10 MG tablet  metFORMIN (GLUCOPHAGE) 1000 MG tablet  metoprolol succinate ER (TOPROL XL) 100 MG 24 hr tablet  metoprolol succinate ER (TOPROL XL) 50 MG 24 hr tablet  Multiple Vitamin (MULTIVITAMIN  ADULT PO)          Review of Systems    Physical Exam   BP: 131/84  Pulse: 84  Temp: 97.7  F (36.5  C)  Resp: 18  SpO2: 98 %      Physical Exam  Constitutional:       General: She is not in acute distress.     Appearance: Normal appearance. She is normal weight. She is not ill-appearing, toxic-appearing or diaphoretic.   HENT:      Head: Normocephalic and atraumatic.      Right Ear: External ear normal.      Left Ear: External ear normal.   Eyes:      Extraocular Movements: Extraocular movements intact.      Conjunctiva/sclera: Conjunctivae normal.      Pupils: Pupils are equal, round, and reactive to light.   Cardiovascular:      Rate and Rhythm: Normal rate.   Pulmonary:      Effort: Pulmonary effort is normal. No respiratory distress.   Musculoskeletal:         General: Normal range of motion.        Arms:    Skin:     General: Skin is warm and dry.      Coloration: Skin is not jaundiced or pale.   Neurological:      Mental Status: She is alert and oriented to person, place, and time. Mental status is at baseline.      Cranial Nerves: No cranial nerve deficit.   Psychiatric:         Mood and Affect: Mood normal.         ED Course   Imaging obtained and reviewed.  No acute fractures or dislocation there is some small ossific densities that may be intra-articular which could be causing her pain.  Will use the shoulder sling I discussed at length with her that she needs to have range of motion of the shoulder throughout the day will [facility]   Procedures                  Results for orders placed or performed during the hospital encounter of 03/17/25 (from the past 24 hours)   XR Shoulder Right G/E 3 Views    Narrative    EXAM: XR SHOULDER RIGHT G/E 3 VIEWS  LOCATION: [facility]  DATE: 3/17/2025    INDICATION: Worsening pain, unknown cause.  COMPARISON: None.      Impression    IMPRESSION: Mild glenohumeral and acromioclavicular joint arthrosis. Small ossific  densities projecting over the superior aspect of the glenohumeral joint, nonspecific, but possibly intra-articular bodies. No acute displaced fracture or dislocation.       Medications - No data to display    Assessments & Plan (with Medical Decision Making)     I have reviewed the nursing notes.    I have reviewed the findings, diagnosis, plan and need for follow up with the patient.        Discharge Medication List as of 3/17/2025  5:29 PM          Final diagnoses:   Rotator cuff impingement syndrome of right shoulder       3/17/2025   [facility]       Nicko Mcknight PA-C  03/18/25 [id number]

## 2025-03-18 NOTE — TELEPHONE ENCOUNTER
Short-Acting Beta Agonist Inhalers Protocol  [id number]/15/2025 08:06 AM   Protocol Details Asthma control assessment score within normal limits in last 6 months

## 2025-03-19 RX ORDER — LEVALBUTEROL TARTRATE 45 UG/1
2 AEROSOL, METERED ORAL EVERY 4 HOURS PRN
Qty: 45 G | Refills: 3 | Status: SHIPPED | OUTPATIENT
Start: 2025-03-19

## 2025-03-31 DIAGNOSIS — I10 BENIGN ESSENTIAL HYPERTENSION: ICD-10-CM

## 2025-03-31 RX ORDER — CHLORTHALIDONE 25 MG/1
25 TABLET ORAL DAILY
Qty: 90 TABLET | Refills: 2 | Status: SHIPPED | OUTPATIENT
Start: 2025-03-31

## 2025-04-08 ENCOUNTER — MEDICAL CORRESPONDENCE (OUTPATIENT)
Dept: MRI IMAGING | Facility: HOSPITAL | Age: 55
End: 2025-04-08

## 2025-04-29 ENCOUNTER — HOSPITAL ENCOUNTER (OUTPATIENT)
Dept: MRI IMAGING | Facility: HOSPITAL | Age: 55
Discharge: HOME | End: 2025-04-29
Attending: SPECIALIST | Admitting: RADIOLOGY
Payer: COMMERCIAL

## 2025-04-29 DIAGNOSIS — M25.511 RIGHT SHOULDER PAIN: ICD-10-CM

## 2025-04-29 PROCEDURE — 73221 MRI JOINT UPR EXTREM W/O DYE: CPT | Mod: 26 | Performed by: RADIOLOGY

## 2025-04-29 PROCEDURE — 73221 MRI JOINT UPR EXTREM W/O DYE: CPT | Mod: RT

## 2025-05-07 ENCOUNTER — TELEPHONE (OUTPATIENT)
Dept: FAMILY MEDICINE | Facility: OTHER | Age: 55
End: 2025-05-07

## 2025-05-07 NOTE — TELEPHONE ENCOUNTER
8:38 AM    Reason for Call: OVERBOOK      The patient is requesting an appointment for preop with Christy or anyone (previous [place] patient).    5/27 right shoulder tear/Dr Long    Was an appointment offered for this call? No  If yes : Appointment type              Date    Preferred method for responding to this message: Telephone Call  What is your phone number ?    If we cannot reach you directly, may we leave a detailed response at the number you provided? Yes    Can this message wait until your PCP/provider returns, if unavailable today? YES,     Cinda Gary

## 2025-05-13 DIAGNOSIS — I10 BENIGN ESSENTIAL HYPERTENSION: ICD-10-CM

## 2025-05-13 RX ORDER — LISINOPRIL 10 MG/1
10 TABLET ORAL 2 TIMES DAILY
Qty: 180 TABLET | Refills: 0 | Status: SHIPPED | OUTPATIENT
Start: 2025-05-13

## 2025-05-13 NOTE — PROGRESS NOTES
Lisinopril  Last signed 8/15/24 #180, 3 LALY Fraser  Last office visit 11/7/24 with note:  Primary hypertension  Perfect pressure.  Patient has upcoming Dr. Harrison 5/20 office visit.  Monica Pena RN  Care Coordination

## 2025-05-15 ASSESSMENT — ASTHMA QUESTIONNAIRES
QUESTION_5 LAST FOUR WEEKS HOW WOULD YOU RATE YOUR ASTHMA CONTROL: COMPLETELY CONTROLLED
ACT_TOTALSCORE: 24
QUESTION_1 LAST FOUR WEEKS HOW MUCH OF THE TIME DID YOUR ASTHMA KEEP YOU FROM GETTING AS MUCH DONE AT WORK, SCHOOL OR AT HOME: NONE OF THE TIME
QUESTION_4 LAST FOUR WEEKS HOW OFTEN HAVE YOU USED YOUR RESCUE INHALER OR NEBULIZER MEDICATION (SUCH AS ALBUTEROL): ONCE A WEEK OR LESS
QUESTION_2 LAST FOUR WEEKS HOW OFTEN HAVE YOU HAD SHORTNESS OF BREATH: NOT AT ALL
QUESTION_3 LAST FOUR WEEKS HOW OFTEN DID YOUR ASTHMA SYMPTOMS (WHEEZING, COUGHING, SHORTNESS OF BREATH, CHEST TIGHTNESS OR PAIN) WAKE YOU UP AT NIGHT OR EARLIER THAN USUAL IN THE MORNING: NOT AT ALL

## 2025-05-20 ENCOUNTER — TELEPHONE (OUTPATIENT)
Dept: FAMILY MEDICINE | Facility: OTHER | Age: 55
End: 2025-05-20

## 2025-05-20 ENCOUNTER — RESULTS FOLLOW-UP (OUTPATIENT)
Dept: FAMILY MEDICINE | Facility: OTHER | Age: 55
End: 2025-05-20

## 2025-05-20 ENCOUNTER — OFFICE VISIT (OUTPATIENT)
Dept: FAMILY MEDICINE | Facility: OTHER | Age: 55
End: 2025-05-20
Attending: STUDENT IN AN ORGANIZED HEALTH CARE EDUCATION/TRAINING PROGRAM
Payer: COMMERCIAL

## 2025-05-20 VITALS
RESPIRATION RATE: 18 BRPM | OXYGEN SATURATION: 99 % | HEART RATE: 78 BPM | SYSTOLIC BLOOD PRESSURE: 126 MMHG | BODY MASS INDEX: 33.66 KG/M2 | HEIGHT: 61 IN | DIASTOLIC BLOOD PRESSURE: 72 MMHG | TEMPERATURE: 97.5 F | WEIGHT: 178.3 LBS

## 2025-05-20 DIAGNOSIS — I10 BENIGN ESSENTIAL HYPERTENSION: ICD-10-CM

## 2025-05-20 DIAGNOSIS — E11.9 TYPE 2 DIABETES MELLITUS WITHOUT COMPLICATION, WITHOUT LONG-TERM CURRENT USE OF INSULIN (H): ICD-10-CM

## 2025-05-20 DIAGNOSIS — E03.9 ACQUIRED HYPOTHYROIDISM: ICD-10-CM

## 2025-05-20 DIAGNOSIS — J30.1 SEASONAL ALLERGIC RHINITIS DUE TO POLLEN: ICD-10-CM

## 2025-05-20 DIAGNOSIS — E66.01 MORBID OBESITY (H): ICD-10-CM

## 2025-05-20 DIAGNOSIS — M75.121 COMPLETE TEAR OF RIGHT ROTATOR CUFF, UNSPECIFIED WHETHER TRAUMATIC: ICD-10-CM

## 2025-05-20 DIAGNOSIS — F43.22 ADJUSTMENT DISORDER WITH ANXIOUS MOOD: ICD-10-CM

## 2025-05-20 DIAGNOSIS — Z01.818 PRE-OPERATIVE EXAMINATION: Primary | ICD-10-CM

## 2025-05-20 LAB
ANION GAP SERPL CALCULATED.3IONS-SCNC: 18 MMOL/L (ref 7–15)
BASOPHILS # BLD AUTO: 0.1 10E3/UL (ref 0–0.2)
BASOPHILS NFR BLD AUTO: 1 %
BUN SERPL-MCNC: 26.1 MG/DL (ref 6–20)
CALCIUM SERPL-MCNC: 9.4 MG/DL (ref 8.8–10.4)
CHLORIDE SERPL-SCNC: 98 MMOL/L (ref 98–107)
CREAT SERPL-MCNC: 1.07 MG/DL (ref 0.51–0.95)
EGFRCR SERPLBLD CKD-EPI 2021: 61 ML/MIN/1.73M2
EOSINOPHIL # BLD AUTO: 0.3 10E3/UL (ref 0–0.7)
EOSINOPHIL NFR BLD AUTO: 3 %
ERYTHROCYTE [DISTWIDTH] IN BLOOD BY AUTOMATED COUNT: 12.5 % (ref 10–15)
EST. AVERAGE GLUCOSE BLD GHB EST-MCNC: 143 MG/DL
GLUCOSE SERPL-MCNC: 126 MG/DL (ref 70–99)
HBA1C MFR BLD: 6.6 %
HCO3 SERPL-SCNC: 24 MMOL/L (ref 22–29)
HCT VFR BLD AUTO: 43 % (ref 35–47)
HGB BLD-MCNC: 14.8 G/DL (ref 11.7–15.7)
IMM GRANULOCYTES # BLD: 0 10E3/UL
IMM GRANULOCYTES NFR BLD: 0 %
LYMPHOCYTES # BLD AUTO: 3.3 10E3/UL (ref 0.8–5.3)
LYMPHOCYTES NFR BLD AUTO: 39 %
MCH RBC QN AUTO: 32.2 PG (ref 26.5–33)
MCHC RBC AUTO-ENTMCNC: 34.4 G/DL (ref 31.5–36.5)
MCV RBC AUTO: 94 FL (ref 78–100)
MONOCYTES # BLD AUTO: 0.6 10E3/UL (ref 0–1.3)
MONOCYTES NFR BLD AUTO: 8 %
NEUTROPHILS # BLD AUTO: 4.1 10E3/UL (ref 1.6–8.3)
NEUTROPHILS NFR BLD AUTO: 49 %
NRBC # BLD AUTO: 0 10E3/UL
NRBC BLD AUTO-RTO: 0 /100
PLATELET # BLD AUTO: 320 10E3/UL (ref 150–450)
POTASSIUM SERPL-SCNC: 3.3 MMOL/L (ref 3.4–5.3)
RBC # BLD AUTO: 4.6 10E6/UL (ref 3.8–5.2)
SODIUM SERPL-SCNC: 140 MMOL/L (ref 135–145)
WBC # BLD AUTO: 8.4 10E3/UL (ref 4–11)

## 2025-05-20 RX ORDER — FLUTICASONE PROPIONATE 50 MCG
SPRAY, SUSPENSION (ML) NASAL
Qty: 48 G | Refills: 1 | Status: SHIPPED | OUTPATIENT
Start: 2025-05-20

## 2025-05-20 RX ORDER — AMLODIPINE BESYLATE 10 MG/1
10 TABLET ORAL DAILY
Qty: 90 TABLET | Refills: 2 | Status: SHIPPED | OUTPATIENT
Start: 2025-05-20

## 2025-05-20 ASSESSMENT — PAIN SCALES - GENERAL: PAINLEVEL_OUTOF10: NO PAIN (0)

## 2025-05-20 NOTE — PATIENT INSTRUCTIONS
Medication instructions in anticipation of right shoulder surgery    No trulicity for a week  No metformin on AM of surgery  Can continue amlodipine  No Chlorthalidone on AM of surgery  Bring inhaler  No Jardiance for 3 days before surgery  No ASA for 1 week  No NSAIDs for one week  Continue metoprolol  Continue Levothyroxine  Stop supplements now  Hold claritin on AM  No flonase on AM of surgery

## 2025-05-20 NOTE — PROGRESS NOTES
Preoperative Evaluation  [facility] [place]  [address]  [place] [address]  Phone: [phone]  Primary Provider: LALY Ortega  Pre-op Performing Provider: Sarah Harrison MD  May 20, 2025         5/15/2025   Surgical Information   What procedure is being done? Right shoulder rotator cuff repair   Facility or Hospital where procedure/surgery will be performed: [facility]   Who is doing the procedure / surgery? Dr. Wilfrid Long   Date of surgery / procedure: 5/27/2025   Time of surgery / procedure: Unknown at this time   Where do you plan to recover after surgery? at home with family     Fax number for surgical facility: [facility] [phone],  [phone]    Assessment & Plan     The proposed surgical procedure is considered INTERMEDIATE risk.    Pre-operative examination  María presents for her pre-op physical  She is a moderate risk patient for a moderate risk procedure  ROS is negative  VSS and PE reassuring  Blood work pending  - CBC with platelets and differential; Future  - Basic metabolic panel; Future  - EKG 12-lead complete w/read - (Clinic Performed)    Complete tear of right rotator cuff, unspecified whether traumatic  Indication for surgery with Dr. Long    HTN (hypertension)  BP stable and in target range  - amLODIPine (NORVASC) 10 MG tablet; Take 1 tablet (10 mg) by mouth daily.    Seasonal allergic rhinitis due to pollen  Stable.  Refilled  - fluticasone (FLONASE) 50 MCG/ACT nasal spray; SHAKE WELL AND INSTILL 2 SPRAYS INTO EACH NOSTRIL DAILY    Type 2 diabetes mellitus without complication, without long-term current use of insulin (H)  Very well controlled historically    Hypothyroidism, acquired  Stable clinically.  TSH in target range    Adjustment disorder with anxious mood  Stable    Obesity  Body mass index is 33.97 kg/m .  No history of INDY         - No identified additional risk factors other than previously  addressed    Antiplatelet or Anticoagulation Medication Instructions   - aspirin: Discontinue aspirin 7 days prior to procedure to reduce bleeding risk. It should be resumed postoperatively.     Additional Medication Instructions   - SGLT2 Inhibitor (canagliflozin, dapagliflozin, or empagliflozin): DO NOT TAKE 3 days before surgery.   No trulicity for a week  No metformin on AM of surgery  Can continue amlodipine  No Chlorthalidone on AM of surgery  Bring inhaler  No Jardiance for 3 days before surgery  No ASA for 1 week  No NSAIDs for one week  Continue metoprolol  Continue Levothyroxine  Stop supplements now  Hold claritin on AM  No flonase on AM of surgery    Recommendation  Approval given to proceed with proposed procedure, without further diagnostic evaluation.      Dilma Abdi is a 54 year old, presenting for the following:  Pre-Op Exam          5/20/2025    12:57 PM   Additional Questions   Roomed by Bharati CUELLAR   Accompanied by self         5/20/2025    12:57 PM   Patient Reported Additional Medications   Patient reports taking the following new medications none     HPI:     Patient presents to clinic for pre-op evaluation in anticipation of right shoulder arthroscopy, subacromial decompression, debridement and rotator cuff repair with Dr. Wilfrid Long on 5/27.  No issues with anesthesia in the past.  No bleeding problems.  No bleeding diatheses.  No INDY.  Able to perform 4 METS of exercise.          5/15/2025   Pre-Op Questionnaire   Have you ever had a heart attack or stroke? No   Have you ever had surgery on your heart or blood vessels, such as a stent placement, a coronary artery bypass, or surgery on an artery in your head, neck, heart, or legs? No   Do you have chest pain with activity? No   Do you have a history of heart failure? No   Do you currently have a cold, bronchitis or symptoms of other infection? No   Do you have a cough, shortness of breath, or wheezing? No   Do you or anyone in your  family have previous history of blood clots? No   Do you or does anyone in your family have a serious bleeding problem such as prolonged bleeding following surgeries or cuts? No   Have you ever had problems with anemia or been told to take iron pills? No   Have you had any abnormal blood loss such as black, tarry or bloody stools, or abnormal vaginal bleeding? No   Have you ever had a blood transfusion? No   Are you willing to have a blood transfusion if it is medically needed before, during, or after your surgery? Yes   Have you or any of your relatives ever had problems with anesthesia? Not that patient knows of    Do you have sleep apnea, excessive snoring or daytime drowsiness? No   Do you have any artifical heart valves or other implanted medical devices like a pacemaker, defibrillator, or continuous glucose monitor? No   Do you have artificial joints? No   Are you allergic to latex? No     Advance Care Planning    Discussed advance care planning with patient; however, patient declined at this time.    Preoperative Review of    reviewed - controlled substances reflected in medication list.    Status of Chronic Conditions:  See problem list for active medical problems.  Problems all longstanding and stable, except as noted/documented.  See ROS for pertinent symptoms related to these conditions.    Patient Active Problem List    Diagnosis Date Noted    Abnormal platelets (H) 08/15/2024     Priority: Medium    Adjustment disorder with anxious mood 05/12/2019     Priority: Medium    Type 2 diabetes mellitus without complication, without long-term current use of insulin (H) 08/30/2017     Priority: Medium    Obesity 03/02/2014     Priority: Medium    Allergic rhinitis due to pollen 04/25/2011     Priority: Medium    HTN (hypertension) 04/25/2011     Priority: Medium    Hypothyroidism, acquired 01/01/2011     Priority: Medium    Asthma, mild intermittent 01/01/2011     Priority: Medium    Asthma 10/29/2009      Priority: Medium     Overview:   Seasonal. [facility] record.       Hypertension 10/29/2009     Priority: Medium     Overview:   [facility] record.       Achilles tendon tear 10/28/2009     Priority: Medium    Chronic lymphocytic thyroiditis 01/28/2009     Priority: Medium    Nontoxic uninodular goiter 11/28/2007     Priority: Medium      Past Medical History:   Diagnosis Date    Allergic Rhinitis, Seasonal 04/25/2011    Asthma 01/01/2011    Diabetes (H)     Hypertension, Benign 04/25/2011    Hypothyroidism 01/01/2011    Nontoxic uninodular goiter 11/28/2007    Prediabetes 04/25/2011    Uncomplicated asthma      Past Surgical History:   Procedure Laterality Date    CHOLECYSTECTOMY  01/01/1996    eye surgery for strabismus at ages 2 & 4      HYSTERECTOMY TOTAL ABDOMINAL, SALPINGECTOMY Bilateral 07/02/2018    [facility]. Ovaries remain. 10 cm uterine mass removed: well circumscribed mass consistent with leiomyoma.    HYSTERECTOMY, PAP NO LONGER INDICATED N/A 07/02/2018    Cervix removed per Hyst path report.     Current Outpatient Medications   Medication Sig Dispense Refill    amLODIPine (NORVASC) 10 MG tablet Take 1 tablet (10 mg) by mouth daily 90 tablet 2    aspirin 81 MG EC tablet Take 81 mg by mouth daily      blood glucose (ACCU-CHEK GUIDE) test strip TEST ONCE DAILY 100 strip 3    blood glucose monitoring (SOFTCLIX) lancets Testing once daily 100 each 3    chlorthalidone (HYGROTON) 25 MG tablet TAKE 1 TABLET(25 MG) BY MOUTH DAILY 90 tablet 2    Dulaglutide (TRULICITY) 3 MG/0.5ML SOAJ Inject 3 mg subcutaneously every 7 days. 4 mL 6    empagliflozin (JARDIANCE) 25 MG TABS tablet Take 1 tablet (25 mg) by mouth daily. 90 tablet 1    fluticasone (FLONASE) 50 MCG/ACT nasal spray SHAKE WELL AND INSTILL 2 SPRAYS INTO EACH NOSTRIL DAILY 48 g 1    levalbuterol (XOPENEX HFA) 45 MCG/ACT inhaler INHALE 2 PUFFS INTO THE LUNGS EVERY 4 HOURS AS NEEDED FOR WHEEZING OR SHORTNESS OF BREATH 45 g 3    levothyroxine  (SYNTHROID/LEVOTHROID) 150 MCG tablet Take 125 mcg by mouth daily      lisinopril (ZESTRIL) 10 MG tablet TAKE 1 TABLET(10 MG) BY MOUTH TWICE DAILY 180 tablet 0    loratadine (CLARITIN) 10 MG tablet Take 10 mg by mouth daily as needed for allergies      metFORMIN (GLUCOPHAGE) 1000 MG tablet Take 1 tablet (1,000 mg) by mouth 2 times daily (with meals). 180 tablet 1    metoprolol succinate ER (TOPROL XL) 100 MG 24 hr tablet TAKE 1 TABLET(100 MG) BY MOUTH DAILY IN THE MORNING 90 tablet 3    metoprolol succinate ER (TOPROL XL) 50 MG 24 hr tablet TAKE 1 TABLET(50 MG) BY MOUTH DAILY IN THE AFTERNOON 90 tablet 3    Multiple Vitamin (MULTIVITAMIN ADULT PO) Take 1 tablet by mouth daily         Allergies   Allergen Reactions    Amoxicillin Trihydrate Itching     Augmentin      Clavulanic Acid Potassium Itching     Augmentin      Levaquin [Levofloxacin] Swelling    Penicillins Hives        Social History     Tobacco Use    Smoking status: Never    Smokeless tobacco: Never    Tobacco comments:     no passive exposure   Substance Use Topics    Alcohol use: Not Currently     Comment: socially      Family History   Problem Relation Age of Onset    Other - See Comments Mother         fibromyalgia and endometreosis    Heart Disease Mother         murmer    Allergies Mother         environmental    Diabetes Father     Other - See Comments Sister         endometriosis    Allergies Sister         environmental    Breast Cancer Paternal Grandmother         over 50    Hypertension Brother     Allergies Brother         environmental    Breast Cancer Maternal Aunt         early 50's    Breast Cancer Other     Asthma No family hx of     Thyroid Disease No family hx of      History   Drug Use No             Review of Systems  Constitutional, HEENT, cardiovascular, pulmonary, GI, , musculoskeletal, neuro, skin, endocrine and psych systems are negative, except as otherwise noted.    Objective    /72 (BP Location: Left arm, Patient  "Position: Sitting, Cuff Size: Adult Large)   Pulse 78   Temp 97.5  F (36.4  C) (Tympanic)   Resp 18   Ht 1.543 m (5' 0.75\")   Wt 80.9 kg (178 lb 4.8 oz)   LMP 03/07/2015 (Exact Date)   SpO2 99%   BMI 33.97 kg/m     Estimated body mass index is 33.97 kg/m  as calculated from the following:    Height as of this encounter: 1.543 m (5' 0.75\").    Weight as of this encounter: 80.9 kg (178 lb 4.8 oz).  Physical Exam  GENERAL: alert and no distress  EYES: Eyes grossly normal to inspection, PERRL and conjunctivae and sclerae normal  HENT: ear canals and TM's normal, nose and mouth without ulcers or lesions  NECK: no adenopathy, no asymmetry, masses, or scars  RESP: lungs clear to auscultation - no rales, rhonchi or wheezes  CV: regular rate and rhythm, normal S1 S2, no S3 or S4, no murmur, click or rub, no peripheral edema  ABDOMEN: soft, nontender, no hepatosplenomegaly, no masses and bowel sounds normal  MS: no gross musculoskeletal defects noted, no edema  SKIN: no suspicious lesions or rashes  NEURO: Normal strength and tone, mentation intact and speech normal  PSYCH: mentation appears normal, affect normal/bright    Recent Labs   Lab Test 12/30/24  1436 11/12/24  0801   HGB  --  13.7   PLT  --  285    140   POTASSIUM 3.8 3.9   CR 1.08* 1.21*        Diagnostics  Labs pending at this time.  Results will be reviewed when available.   EKG: appears normal, NSR, normal axis, normal intervals, no acute ST/T changes c/w ischemia, no LVH by voltage criteria, unchanged from previous tracings    Revised Cardiac Risk Index (RCRI)  The patient has the following serious cardiovascular risks for perioperative complications:   - No serious cardiac risks = 0 points     RCRI Interpretation: 0 points: Class I (very low risk - 0.4% complication rate)         Signed Electronically by: Sarah Harrison MD  A copy of this evaluation report is provided to the requesting physician.      "

## 2025-05-20 NOTE — TELEPHONE ENCOUNTER
Pre-op & EKG faxed to [facility] to [phone] & Dr. Long/IFEOMA at [phone].

## 2025-05-21 LAB
ATRIAL RATE - MUSE: 76 BPM
DIASTOLIC BLOOD PRESSURE - MUSE: NORMAL MMHG
INTERPRETATION ECG - MUSE: NORMAL
P AXIS - MUSE: 63 DEGREES
PR INTERVAL - MUSE: 132 MS
QRS DURATION - MUSE: 90 MS
QT - MUSE: 426 MS
QTC - MUSE: 479 MS
R AXIS - MUSE: 59 DEGREES
SYSTOLIC BLOOD PRESSURE - MUSE: NORMAL MMHG
T AXIS - MUSE: 57 DEGREES
VENTRICULAR RATE- MUSE: 76 BPM

## 2025-06-26 NOTE — TELEPHONE ENCOUNTER
7:39 AM    Reason for Call: OVERBOOK    Patient is having the following symptoms: Having pressure in right ear when she lays down she feels pressure since Monday 12/14/2020 days.    The patient is requesting an appointment for right ear with Dr Pino. Edith needs after 3pm today  She would like to get in today with someone if Dr Pino can't see her today after 3pm.    Was an appointment offered for this call? No  If yes : Appointment type              Date    Preferred method for responding to this message: Telephone Call  What is your phone number ? [phone]    If we cannot reach you directly, may we leave a detailed response at the number you provided? Yes    Can this message wait until your PCP/provider returns, if unavailable today? No,             Aurora Car      
Patient scheduled this afternoon okay per pcp.     POLO Juarez    
Patient/EMS

## 2025-07-04 DIAGNOSIS — E11.65 TYPE 2 DIABETES MELLITUS WITH HYPERGLYCEMIA, WITHOUT LONG-TERM CURRENT USE OF INSULIN (H): ICD-10-CM

## 2025-07-07 RX ORDER — DULAGLUTIDE 1.5 MG/.5ML
INJECTION, SOLUTION SUBCUTANEOUS
Qty: 12 ML | Refills: 0 | Status: SHIPPED | OUTPATIENT
Start: 2025-07-07

## 2025-07-07 NOTE — TELEPHONE ENCOUNTER
Trulicity 1.5 MG/0.5ML Subcutaneous Solution Pen-injector       Last Written Prescription Date:  02/07/2025  Last Fill Quantity: 4 mL,   # refills: 6  Last Office Visit: 11/07/2024  Future Office visit:    Next 5 appointments (look out 90 days)      Aug 18, 2025 8:00 AM  (Arrive by 7:45 AM)  Adult Preventative Visit with Sarah Harrison MD  [facility] [place] ([facility] [place] ) [address] [place] [id number]  [phone]             Routing refill request to provider for review/approval because:    GLP-1 Agonists Protocol [id number]/2025 05:50 AM   Protocol Details Medication is active on med list and the sig matches. RN to manually verify dose and sig if red X/fail.          Kimberly Boecker, RN

## 2025-07-29 ENCOUNTER — MEDICAL CORRESPONDENCE (OUTPATIENT)
Dept: HEALTH INFORMATION MANAGEMENT | Facility: HOSPITAL | Age: 55
End: 2025-07-29

## 2025-07-29 DIAGNOSIS — E83.52 HYPERCALCEMIA: ICD-10-CM

## 2025-07-29 DIAGNOSIS — E06.3 CHRONIC LYMPHOCYTIC THYROIDITIS: Primary | ICD-10-CM

## 2025-07-29 DIAGNOSIS — I10 PRIMARY HYPERTENSION: ICD-10-CM

## 2025-08-02 ENCOUNTER — HOSPITAL ENCOUNTER (EMERGENCY)
Facility: HOSPITAL | Age: 55
Discharge: HOME | End: 2025-08-02
Attending: PHYSICIAN ASSISTANT | Admitting: PHYSICIAN ASSISTANT
Payer: COMMERCIAL

## 2025-08-02 VITALS
OXYGEN SATURATION: 100 % | WEIGHT: 183 LBS | HEART RATE: 75 BPM | DIASTOLIC BLOOD PRESSURE: 69 MMHG | RESPIRATION RATE: 18 BRPM | SYSTOLIC BLOOD PRESSURE: 116 MMHG | BODY MASS INDEX: 34.86 KG/M2 | TEMPERATURE: 97.7 F

## 2025-08-02 DIAGNOSIS — L03.031 PARONYCHIA OF GREAT TOE OF RIGHT FOOT: Primary | ICD-10-CM

## 2025-08-02 PROCEDURE — 99213 OFFICE O/P EST LOW 20 MIN: CPT | Performed by: PHYSICIAN ASSISTANT

## 2025-08-02 PROCEDURE — G0463 HOSPITAL OUTPT CLINIC VISIT: HCPCS | Performed by: PHYSICIAN ASSISTANT

## 2025-08-02 RX ORDER — SULFAMETHOXAZOLE AND TRIMETHOPRIM 800; 160 MG/1; MG/1
1 TABLET ORAL 2 TIMES DAILY
Qty: 20 TABLET | Refills: 0 | Status: SHIPPED | OUTPATIENT
Start: 2025-08-02 | End: 2025-08-12

## 2025-08-02 ASSESSMENT — ACTIVITIES OF DAILY LIVING (ADL): ADLS_ACUITY_SCORE: 41

## 2025-08-08 ENCOUNTER — TELEPHONE (OUTPATIENT)
Dept: EDUCATION SERVICES | Facility: HOSPITAL | Age: 55
End: 2025-08-08

## 2025-08-08 DIAGNOSIS — E11.9 TYPE 2 DIABETES MELLITUS WITHOUT COMPLICATION, WITHOUT LONG-TERM CURRENT USE OF INSULIN (H): Primary | ICD-10-CM

## 2025-08-13 SDOH — HEALTH STABILITY: PHYSICAL HEALTH: ON AVERAGE, HOW MANY DAYS PER WEEK DO YOU ENGAGE IN MODERATE TO STRENUOUS EXERCISE (LIKE A BRISK WALK)?: 2 DAYS

## 2025-08-13 SDOH — HEALTH STABILITY: PHYSICAL HEALTH: ON AVERAGE, HOW MANY MINUTES DO YOU ENGAGE IN EXERCISE AT THIS LEVEL?: 20 MIN

## 2025-08-13 ASSESSMENT — ASTHMA QUESTIONNAIRES
QUESTION_2 LAST FOUR WEEKS HOW OFTEN HAVE YOU HAD SHORTNESS OF BREATH: NOT AT ALL
QUESTION_3 LAST FOUR WEEKS HOW OFTEN DID YOUR ASTHMA SYMPTOMS (WHEEZING, COUGHING, SHORTNESS OF BREATH, CHEST TIGHTNESS OR PAIN) WAKE YOU UP AT NIGHT OR EARLIER THAN USUAL IN THE MORNING: ONCE OR TWICE
QUESTION_5 LAST FOUR WEEKS HOW WOULD YOU RATE YOUR ASTHMA CONTROL: COMPLETELY CONTROLLED
QUESTION_4 LAST FOUR WEEKS HOW OFTEN HAVE YOU USED YOUR RESCUE INHALER OR NEBULIZER MEDICATION (SUCH AS ALBUTEROL): ONCE A WEEK OR LESS
ACT_TOTALSCORE: 23
QUESTION_1 LAST FOUR WEEKS HOW MUCH OF THE TIME DID YOUR ASTHMA KEEP YOU FROM GETTING AS MUCH DONE AT WORK, SCHOOL OR AT HOME: NONE OF THE TIME

## 2025-08-13 ASSESSMENT — SOCIAL DETERMINANTS OF HEALTH (SDOH): HOW OFTEN DO YOU GET TOGETHER WITH FRIENDS OR RELATIVES?: ONCE A WEEK

## 2025-08-14 ENCOUNTER — TRANSFERRED RECORDS (OUTPATIENT)
Dept: MULTI SPECIALTY CLINIC | Facility: CLINIC | Age: 55
End: 2025-08-14
Payer: COMMERCIAL

## 2025-08-14 LAB — RETINOPATHY: NORMAL

## 2025-08-18 ENCOUNTER — TELEPHONE (OUTPATIENT)
Dept: FAMILY MEDICINE | Facility: OTHER | Age: 55
End: 2025-08-18

## 2025-08-18 ENCOUNTER — OFFICE VISIT (OUTPATIENT)
Dept: FAMILY MEDICINE | Facility: OTHER | Age: 55
End: 2025-08-18
Attending: STUDENT IN AN ORGANIZED HEALTH CARE EDUCATION/TRAINING PROGRAM
Payer: COMMERCIAL

## 2025-08-18 VITALS
WEIGHT: 186 LBS | SYSTOLIC BLOOD PRESSURE: 128 MMHG | BODY MASS INDEX: 35.12 KG/M2 | DIASTOLIC BLOOD PRESSURE: 78 MMHG | HEIGHT: 61 IN | OXYGEN SATURATION: 96 % | HEART RATE: 74 BPM | TEMPERATURE: 96.9 F | RESPIRATION RATE: 16 BRPM

## 2025-08-18 DIAGNOSIS — E11.65 TYPE 2 DIABETES MELLITUS WITH HYPERGLYCEMIA, WITHOUT LONG-TERM CURRENT USE OF INSULIN (H): ICD-10-CM

## 2025-08-18 DIAGNOSIS — E03.9 ACQUIRED HYPOTHYROIDISM: ICD-10-CM

## 2025-08-18 DIAGNOSIS — L03.031 PARONYCHIA OF TOE, RIGHT: Primary | ICD-10-CM

## 2025-08-18 DIAGNOSIS — E83.52 HYPERCALCEMIA: ICD-10-CM

## 2025-08-18 DIAGNOSIS — D69.1 ABNORMAL PLATELETS (H): ICD-10-CM

## 2025-08-18 DIAGNOSIS — L03.031 PARONYCHIA OF TOE, RIGHT: ICD-10-CM

## 2025-08-18 DIAGNOSIS — I10 PRIMARY HYPERTENSION: ICD-10-CM

## 2025-08-18 DIAGNOSIS — Z01.419 WELL WOMAN EXAM: ICD-10-CM

## 2025-08-18 DIAGNOSIS — E06.3 CHRONIC LYMPHOCYTIC THYROIDITIS: ICD-10-CM

## 2025-08-18 DIAGNOSIS — E66.01 MORBID OBESITY (H): ICD-10-CM

## 2025-08-18 DIAGNOSIS — I10 BENIGN ESSENTIAL HYPERTENSION: Primary | ICD-10-CM

## 2025-08-18 LAB
ALBUMIN SERPL BCG-MCNC: 4.5 G/DL (ref 3.5–5.2)
ANION GAP SERPL CALCULATED.3IONS-SCNC: 14 MMOL/L (ref 7–15)
BUN SERPL-MCNC: 26 MG/DL (ref 6–20)
CALCIUM SERPL-MCNC: 9.7 MG/DL (ref 8.8–10.4)
CHLORIDE SERPL-SCNC: 98 MMOL/L (ref 98–107)
CHOLEST SERPL-MCNC: 197 MG/DL
CREAT SERPL-MCNC: 1.03 MG/DL (ref 0.51–0.95)
CREAT UR-MCNC: 119.8 MG/DL
EGFRCR SERPLBLD CKD-EPI 2021: 64 ML/MIN/1.73M2
EST. AVERAGE GLUCOSE BLD GHB EST-MCNC: 143 MG/DL
FASTING STATUS PATIENT QL REPORTED: YES
GLUCOSE SERPL-MCNC: 124 MG/DL (ref 70–99)
HBA1C MFR BLD: 6.6 %
HCO3 SERPL-SCNC: 27 MMOL/L (ref 22–29)
HCV AB SERPL QL IA: NONREACTIVE
HDLC SERPL-MCNC: 74 MG/DL
HIV 1+2 AB+HIV1 P24 AG SERPL QL IA: NONREACTIVE
LDLC SERPL CALC-MCNC: 67 MG/DL
MICROALBUMIN UR-MCNC: 14.8 MG/L
MICROALBUMIN/CREAT UR: 12.35 MG/G CR (ref 0–25)
NONHDLC SERPL-MCNC: 123 MG/DL
PHOSPHATE SERPL-MCNC: 3.2 MG/DL (ref 2.5–4.5)
POTASSIUM SERPL-SCNC: 3.6 MMOL/L (ref 3.4–5.3)
PTH-INTACT SERPL-MCNC: 70 PG/ML (ref 15–65)
SODIUM SERPL-SCNC: 139 MMOL/L (ref 135–145)
T4 FREE SERPL-MCNC: 1.86 NG/DL (ref 0.9–1.7)
TRIGL SERPL-MCNC: 282 MG/DL
TSH SERPL DL<=0.005 MIU/L-ACNC: 0.75 UIU/ML (ref 0.3–4.2)

## 2025-08-18 PROCEDURE — 87389 HIV-1 AG W/HIV-1&-2 AB AG IA: CPT | Performed by: STUDENT IN AN ORGANIZED HEALTH CARE EDUCATION/TRAINING PROGRAM

## 2025-08-18 PROCEDURE — 99214 OFFICE O/P EST MOD 30 MIN: CPT | Mod: 25 | Performed by: STUDENT IN AN ORGANIZED HEALTH CARE EDUCATION/TRAINING PROGRAM

## 2025-08-18 PROCEDURE — 80069 RENAL FUNCTION PANEL: CPT | Performed by: STUDENT IN AN ORGANIZED HEALTH CARE EDUCATION/TRAINING PROGRAM

## 2025-08-18 PROCEDURE — 83970 ASSAY OF PARATHORMONE: CPT | Performed by: STUDENT IN AN ORGANIZED HEALTH CARE EDUCATION/TRAINING PROGRAM

## 2025-08-18 PROCEDURE — 3074F SYST BP LT 130 MM HG: CPT | Performed by: STUDENT IN AN ORGANIZED HEALTH CARE EDUCATION/TRAINING PROGRAM

## 2025-08-18 PROCEDURE — 3044F HG A1C LEVEL LT 7.0%: CPT | Performed by: STUDENT IN AN ORGANIZED HEALTH CARE EDUCATION/TRAINING PROGRAM

## 2025-08-18 PROCEDURE — 84443 ASSAY THYROID STIM HORMONE: CPT | Performed by: STUDENT IN AN ORGANIZED HEALTH CARE EDUCATION/TRAINING PROGRAM

## 2025-08-18 PROCEDURE — 36415 COLL VENOUS BLD VENIPUNCTURE: CPT | Performed by: STUDENT IN AN ORGANIZED HEALTH CARE EDUCATION/TRAINING PROGRAM

## 2025-08-18 PROCEDURE — 1125F AMNT PAIN NOTED PAIN PRSNT: CPT | Performed by: STUDENT IN AN ORGANIZED HEALTH CARE EDUCATION/TRAINING PROGRAM

## 2025-08-18 PROCEDURE — 82570 ASSAY OF URINE CREATININE: CPT | Performed by: STUDENT IN AN ORGANIZED HEALTH CARE EDUCATION/TRAINING PROGRAM

## 2025-08-18 PROCEDURE — 86803 HEPATITIS C AB TEST: CPT | Performed by: STUDENT IN AN ORGANIZED HEALTH CARE EDUCATION/TRAINING PROGRAM

## 2025-08-18 PROCEDURE — 3048F LDL-C <100 MG/DL: CPT | Performed by: STUDENT IN AN ORGANIZED HEALTH CARE EDUCATION/TRAINING PROGRAM

## 2025-08-18 PROCEDURE — 80061 LIPID PANEL: CPT | Performed by: STUDENT IN AN ORGANIZED HEALTH CARE EDUCATION/TRAINING PROGRAM

## 2025-08-18 PROCEDURE — 83036 HEMOGLOBIN GLYCOSYLATED A1C: CPT | Performed by: STUDENT IN AN ORGANIZED HEALTH CARE EDUCATION/TRAINING PROGRAM

## 2025-08-18 PROCEDURE — 99396 PREV VISIT EST AGE 40-64: CPT | Performed by: STUDENT IN AN ORGANIZED HEALTH CARE EDUCATION/TRAINING PROGRAM

## 2025-08-18 PROCEDURE — 84244 ASSAY OF RENIN: CPT | Mod: 90 | Performed by: STUDENT IN AN ORGANIZED HEALTH CARE EDUCATION/TRAINING PROGRAM

## 2025-08-18 PROCEDURE — 84439 ASSAY OF FREE THYROXINE: CPT | Performed by: STUDENT IN AN ORGANIZED HEALTH CARE EDUCATION/TRAINING PROGRAM

## 2025-08-18 PROCEDURE — 82088 ASSAY OF ALDOSTERONE: CPT | Performed by: STUDENT IN AN ORGANIZED HEALTH CARE EDUCATION/TRAINING PROGRAM

## 2025-08-18 PROCEDURE — 3078F DIAST BP <80 MM HG: CPT | Performed by: STUDENT IN AN ORGANIZED HEALTH CARE EDUCATION/TRAINING PROGRAM

## 2025-08-18 PROCEDURE — 82043 UR ALBUMIN QUANTITATIVE: CPT | Performed by: STUDENT IN AN ORGANIZED HEALTH CARE EDUCATION/TRAINING PROGRAM

## 2025-08-18 RX ORDER — MUPIROCIN CALCIUM 20 MG/G
CREAM TOPICAL 3 TIMES DAILY
Qty: 30 G | Refills: 0 | Status: SHIPPED | OUTPATIENT
Start: 2025-08-18

## 2025-08-18 RX ORDER — AMLODIPINE BESYLATE 10 MG/1
10 TABLET ORAL DAILY
Qty: 90 TABLET | Refills: 2 | Status: SHIPPED | OUTPATIENT
Start: 2025-08-18

## 2025-08-18 RX ORDER — CHLORTHALIDONE 25 MG/1
25 TABLET ORAL DAILY
Qty: 90 TABLET | Refills: 2 | Status: SHIPPED | OUTPATIENT
Start: 2025-08-18

## 2025-08-18 RX ORDER — LISINOPRIL 10 MG/1
10 TABLET ORAL 2 TIMES DAILY
Qty: 180 TABLET | Refills: 0 | Status: SHIPPED | OUTPATIENT
Start: 2025-08-18

## 2025-08-18 RX ORDER — MUPIROCIN 2 %
OINTMENT (GRAM) TOPICAL 3 TIMES DAILY
Qty: 30 G | Refills: 0 | Status: SHIPPED | OUTPATIENT
Start: 2025-08-18

## 2025-08-18 RX ORDER — CHLORHEXIDINE GLUCONATE 40 MG/ML
SOLUTION TOPICAL
Qty: 3785 ML | Refills: 0 | Status: SHIPPED | OUTPATIENT
Start: 2025-08-18

## 2025-08-18 ASSESSMENT — PAIN SCALES - GENERAL: PAINLEVEL_OUTOF10: MILD PAIN (2)

## 2025-08-19 ENCOUNTER — MEDICAL CORRESPONDENCE (OUTPATIENT)
Dept: HEALTH INFORMATION MANAGEMENT | Facility: HOSPITAL | Age: 55
End: 2025-08-19

## 2025-08-19 LAB — ALDOST SERPL-MCNC: 35.5 NG/DL (ref 0–31)

## 2025-08-20 ENCOUNTER — HOSPITAL ENCOUNTER (OUTPATIENT)
Dept: EDUCATION SERVICES | Facility: HOSPITAL | Age: 55
Discharge: HOME | End: 2025-08-20
Attending: PHYSICIAN ASSISTANT
Payer: COMMERCIAL

## 2025-08-20 VITALS
HEART RATE: 83 BPM | BODY MASS INDEX: 34.61 KG/M2 | DIASTOLIC BLOOD PRESSURE: 84 MMHG | HEIGHT: 61 IN | SYSTOLIC BLOOD PRESSURE: 126 MMHG | OXYGEN SATURATION: 96 % | WEIGHT: 183.3 LBS

## 2025-08-20 DIAGNOSIS — E11.9 TYPE 2 DIABETES MELLITUS WITHOUT COMPLICATION, WITHOUT LONG-TERM CURRENT USE OF INSULIN (H): ICD-10-CM

## 2025-08-20 RX ORDER — DULAGLUTIDE 3 MG/.5ML
3 INJECTION, SOLUTION SUBCUTANEOUS
Qty: 6 ML | Refills: 3 | Status: SHIPPED | OUTPATIENT
Start: 2025-08-20

## 2025-08-20 RX ORDER — LANCETS
EACH MISCELLANEOUS
Qty: 100 EACH | Refills: 3 | Status: SHIPPED | OUTPATIENT
Start: 2025-08-20

## 2025-08-20 ASSESSMENT — PAIN SCALES - GENERAL: PAINLEVEL_OUTOF10: NO PAIN (0)

## 2025-09-03 DIAGNOSIS — J30.1 SEASONAL ALLERGIC RHINITIS DUE TO POLLEN: ICD-10-CM

## 2025-09-03 RX ORDER — FLUTICASONE PROPIONATE 50 MCG
SPRAY, SUSPENSION (ML) NASAL
Qty: 48 G | Refills: 0 | Status: SHIPPED | OUTPATIENT
Start: 2025-09-03